# Patient Record
Sex: FEMALE | Race: WHITE | NOT HISPANIC OR LATINO | Employment: FULL TIME | URBAN - METROPOLITAN AREA
[De-identification: names, ages, dates, MRNs, and addresses within clinical notes are randomized per-mention and may not be internally consistent; named-entity substitution may affect disease eponyms.]

---

## 2017-04-11 ENCOUNTER — ALLSCRIPTS OFFICE VISIT (OUTPATIENT)
Dept: OTHER | Facility: OTHER | Age: 57
End: 2017-04-11

## 2017-04-11 DIAGNOSIS — M79.672 PAIN OF LEFT FOOT: ICD-10-CM

## 2017-04-12 ENCOUNTER — TRANSCRIBE ORDERS (OUTPATIENT)
Dept: ADMINISTRATIVE | Facility: HOSPITAL | Age: 57
End: 2017-04-12

## 2017-04-12 ENCOUNTER — HOSPITAL ENCOUNTER (OUTPATIENT)
Dept: RADIOLOGY | Facility: HOSPITAL | Age: 57
Discharge: HOME | End: 2017-04-12
Payer: COMMERCIAL

## 2017-04-12 DIAGNOSIS — M79.672 PAIN OF LEFT FOOT: ICD-10-CM

## 2017-04-12 PROCEDURE — 73630 X-RAY EXAM OF FOOT: CPT

## 2017-04-13 ENCOUNTER — GENERIC CONVERSION - ENCOUNTER (OUTPATIENT)
Dept: OTHER | Facility: OTHER | Age: 57
End: 2017-04-13

## 2017-09-12 ENCOUNTER — ALLSCRIPTS OFFICE VISIT (OUTPATIENT)
Dept: OTHER | Facility: OTHER | Age: 57
End: 2017-09-12

## 2017-11-08 DIAGNOSIS — E04.1 NONTOXIC SINGLE THYROID NODULE: ICD-10-CM

## 2017-11-08 DIAGNOSIS — I10 ESSENTIAL (PRIMARY) HYPERTENSION: ICD-10-CM

## 2018-01-09 NOTE — RESULT NOTES
Verified Results  * XR FOOT 3+ VIEW LEFT 12Apr2017 09:52AM Madelin LOPEZ Order Number: [id number]     Test Name Result Flag Reference   XR FOOT 3+ VW LEFT (Report)     LEFT FOOT     INDICATION: X53 403: Pain in left foot  History taken directly from the electronic ordering system  Heel pain  COMPARISON: None     VIEWS: 3     IMAGES: 3     FINDINGS:     There is no acute fracture or dislocation  Dorsal mid foot spurring  Calcaneal spurring  Soft tissues are unremarkable  IMPRESSION:     No acute osseous abnormality         Workstation performed: [id number]     Signed by:   Farrah Mane MD   4/13/17

## 2018-01-12 NOTE — RESULT NOTES
Verified Results  (1) CBC/PLT/DIFF [id number] 03:00PM Elsusan Paterson     Test Name Result Flag Reference   WBC COUNT 6 95 Thousand/uL  4 31-10 16   RBC COUNT 4 22 Million/uL  3 81-5 12   HEMOGLOBIN 12 7 g/dL  11 5-15 4   HEMATOCRIT 39 0 %  34 8-46  1   MCV 92 fL  82-98   MCH 30 1 pg  26 8-34 3   MCHC 32 6 g/dL  31 4-37 4   RDW 13 5 %  11 6-15 1   MPV 10 6 fL  8 9-12 7   PLATELET COUNT 258 Thousands/uL  149-390   NEUTROPHILS RELATIVE PERCENT 62 %  43-75   LYMPHOCYTES RELATIVE PERCENT 31 %  14-44   MONOCYTES RELATIVE PERCENT 6 %  4-12   EOSINOPHILS RELATIVE PERCENT 1 %  0-6   BASOPHILS RELATIVE PERCENT 0 %  0-1   NEUTROPHILS ABSOLUTE COUNT 4 30 Thousands/?L  1 85-7 62   LYMPHOCYTES ABSOLUTE COUNT 2 13 Thousands/?L  0 60-4 47   MONOCYTES ABSOLUTE COUNT 0 44 Thousand/?L  0 17-1 22   EOSINOPHILS ABSOLUTE COUNT 0 06 Thousand/?L  0 00-0 61   BASOPHILS ABSOLUTE COUNT 0 02 Thousands/?L  0 00-0 10     (1) COMPREHENSIVE METABOLIC PANEL [id number] 54:99DG Christen Paterson     Test Name Result Flag Reference   GLUCOSE,RANDM 93 mg/dL     If the patient is fasting, the ADA then defines impaired fasting glucose as > 100 mg/dL and diabetes as > or equal to 123 mg/dL     SODIUM 144 mmol/L  136-145   POTASSIUM 4 3 mmol/L  3 5-5 3   CHLORIDE 104 mmol/L  100-108   CARBON DIOXIDE 30 mmol/L  21-32   ANION GAP (CALC) 10 mmol/L  4-13   BLOOD UREA NITROGEN 16 mg/dL  5-25   CREATININE 0 70 mg/dL  0 60-1 30   Standardized to IDMS reference method   CALCIUM 8 8 mg/dL  8 3-10 1   BILI, TOTAL 0 70 mg/dL  0 20-1 00   ALK PHOSPHATAS 74 U/L     ALT (SGPT) 17 U/L  12-78   AST(SGOT) 13 U/L  5-45   ALBUMIN 3 6 g/dL  3 5-5 0   TOTAL PROTEIN 6 7 g/dL  6 4-8 2   eGFR Non-African American      >60 0 ml/min/1 73sq [facility] Disease Education Program recommendations are as follows:  GFR calculation is accurate only with a steady state creatinine  Chronic Kidney disease less than 60 ml/min/1 73 sq  meters  Kidney failure less than 15 ml/min/1 73 sq  meters  (1) LIPID PANEL, FASTING [id number] 03:00PM Laura Tijerina     Test Name Result Flag Reference   CHOLESTEROL 220 mg/dL H    HDL,DIRECT 82 mg/dL H 40-60   Specimen collection should occur prior to Metamizole administration due to the potential for falsely depressed results  LDL CHOLESTEROL CALCULATED 126 mg/dL H 0-100   Triglyceride:         Normal              <150 mg/dl       Borderline High    150-199 mg/dl       High               200-499 mg/dl       Very High          >499 mg/dl  Cholesterol:         Desirable        <200 mg/dl      Borderline High  200-239 mg/dl      High             >239 mg/dl  HDL Cholesterol:        High    >59 mg/dL      Low     <41 mg/dL  LDL CALCULATED:    This screening LDL is a calculated result  It does not have the accuracy of the Direct Measured LDL in the monitoring of patients with hyperlipidemia and/or statin therapy  Direct Measure LDL (ZTI957) must be ordered separately in these patients  TRIGLYCERIDES 62 mg/dL  <=150   Specimen collection should occur prior to N-Acetylcysteine or Metamizole administration due to the potential for falsely depressed results  (1) TSH [id number] 03:00PM Laura Tijerina     Test Name Result Flag Reference   TSH 0 967 uIU/mL  0 358-3 740   Patients undergoing fluorescein dye angiography may retain small amounts of fluorescein in the body for 48-72 hours post procedure  Samples containing fluorescein can produce falsely depressed TSH values  If the patient had this procedure,a specimen should be resubmitted post fluorescein clearance            The recommended reference ranges for TSH during pregnancy are as follows:  First trimester 0 1 to 2 5 uIU/mL  Second trimester  0 2 to 3 0 uIU/mL  Third trimester 0 3 to 3 0 uIU/m

## 2018-01-14 VITALS
TEMPERATURE: 98.3 F | SYSTOLIC BLOOD PRESSURE: 110 MMHG | OXYGEN SATURATION: 97 % | BODY MASS INDEX: 32.14 KG/M2 | HEIGHT: 66 IN | DIASTOLIC BLOOD PRESSURE: 76 MMHG | WEIGHT: 200 LBS | HEART RATE: 76 BPM

## 2018-01-14 VITALS
BODY MASS INDEX: 31.5 KG/M2 | HEIGHT: 66 IN | SYSTOLIC BLOOD PRESSURE: 126 MMHG | TEMPERATURE: 96.7 F | WEIGHT: 196 LBS | DIASTOLIC BLOOD PRESSURE: 82 MMHG | OXYGEN SATURATION: 98 % | HEART RATE: 71 BPM

## 2018-01-19 ENCOUNTER — ALLSCRIPTS OFFICE VISIT (OUTPATIENT)
Dept: OTHER | Facility: OTHER | Age: 58
End: 2018-01-19

## 2018-01-19 DIAGNOSIS — R92.2 INCONCLUSIVE MAMMOGRAM: ICD-10-CM

## 2018-01-19 DIAGNOSIS — N64.4 MASTODYNIA: ICD-10-CM

## 2018-01-19 DIAGNOSIS — Z12.11 ENCOUNTER FOR SCREENING FOR MALIGNANT NEOPLASM OF COLON: ICD-10-CM

## 2018-01-20 NOTE — PROGRESS NOTES
Assessment   1  Sprain of costal cartilage, initial encounter (848 3) (S23 41XA)   2  Breast pain (611 71) (N64 4)   3  Dense breasts (793 82) (R92 2)   4  Benign essential hypertension (401 1) (I10)    Plan   Benign essential hypertension    · (Q) COMPREHENSIVE METABOLIC PNL W/ADJUSTED CALCIUM; Status:Active; Requested XYJ:[id number];    · (Q) LIPID PANEL WITH DIRECT LDL; Status:Active; Requested ARS:[id number];   Breast pain, Dense breasts    · MAMMO DIAGNOSTIC BILATERAL W 3D & CAD; Status:Active; Requested SVT:[id number];      PMH: Special screening for malignant neoplasm of colon    · (1) OCCULT BLOOD, FECAL IMMUNOCHEMICAL TEST; Status:Active; Requested    EBE:[id number]; At this point I think that her symptoms are related to muscle sprain  This very likely happened because of her lifting heavy pus woods  I would like to refer her to physical therapy but her insurance will not cover that so I printed some home exercises and stretches that she can do-the more she does the exercises the more likely it will be that she can get better without physical therapy  She is not currently taking any anti-inflammatories and we also discussed that she could take an anti-inflammatory like Advil, Motrin, or Aleve daily for the next week or so to see if this helps at all  Additionally, given the fact that she does have discomfort when she pushes on the breasts and she has not had a mammogram in over a year I do think that she needs to have a mammogram   She also has dense breasts so I have ordered a 3D mammogram   Given her pain I have ordered a diagnostic mammogram           Hypertension-well controlled, continue current meds, I did give her a lab slip for a CMP and lipid panel-I stamped it with our [facility] discount stand up as she does not have any coverage for labs with insurance so this should give her a discount         Discussion/Summary   Possible side effects of new medications were reviewed with the patient/guardian today  The treatment plan was reviewed with the patient/guardian  The patient/guardian understands and agrees with the treatment plan      Chief Complaint   Pt c/o soreness in her chest area  She has been doing a lot of upper body motions lately but this has gotten worse  History of Present Illness   HPI: pt is here c/o pain under both breasts, left worse than the right  use the left side more it a little in the am by the evening 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 - lifting the hoods about once a week but there are 30-40 of them they don't go up that easy and she has to use a lot of effort acute injury that she can remember, though had a really bad cough in September but that resolved but this started in December so does not seem related to the bronchitis she had says the pain is worst when she pushes on the area under her breasts or if she pushes on her nipples has not had a mammo since 8/2016 denies any breast changes - no masses, skin changes, nipple discharge   needs a refill of her BP med has not had labs since 2016 and knows she is due insurance is not really insurance, it helps cover certain things but does not pay for labs or imaging so she really has been putting the blood work off thought she would be getting a new insurance at the beginning of the year but this does not look like it is going to happen now, might happen over the summer, but she would like to get her labs done now          Review of Systems        Constitutional: not feeling poorly,-- no recent weight gain,-- not feeling tired-- and-- no recent weight loss  Cardiovascular: no chest pain  Respiratory: no shortness of breath-- and-- no cough  Breasts: as noted in HPI  Musculoskeletal: as noted in HPI  Integumentary: as noted in HPI  Active Problems   1  Benign essential hypertension (401 1) (I10)   2   BMI 32 0-32 9,adult (V85 32) (Z68 32)   3  Dense breasts (793 82) (R92 2)   4  Esophageal reflux (530 81) (K21 9)   5  History of Malignant Melanoma Of The Skin   6  Single thyroid nodule (241 0) (E04 1)    Past Medical History   1  Acute bronchitis (466 0) (J20 9)   2  History of Fracture Of Distal End Of Radius (813 42)   3  History of Herniated nucleus pulposus, L5-S1 (722 10) (M51 27)   4  History of migraine (V12 49) (Z86 69)   5  History of Malignant Melanoma Of The Skin   6  History of Plantar fasciitis (728 71) (M72 2)  Active Problems And Past Medical History Reviewed: The active problems and past medical history were reviewed and updated today  Family History   Mother    1  Family history of Hypertension (V17 49)  Father    2  Family history of Acute Myocardial Infarction (V17 3)  Sister    3  Family history of Hypertension (V17 49)  Brother    4  Family history of Hypertension (V17 49)  Family History    5  Family history of Cancer   6  Family history of Precursor B-cell Lymphoblastic Lymphoma Of The Bone  Family History Reviewed: The family history was reviewed and updated today  Social History    · Being A Social Drinker   · Never a smoker  The social history was reviewed and updated today  Surgical History   1  History of  Section   2  History of Hysteroscopy   3  History of Tonsillectomy  Surgical History Reviewed: The surgical history was reviewed and updated today  Current Meds    1  HydroCHLOROthiazide 25 MG Oral Tablet; take 1/2-1 tab daily as needed; Therapy: 81Pze5426 to (Last Rx:[id number])  Requested for: 48Bfj9803 Ordered   2  Losartan Potassium-HCTZ 50-12 5 MG Oral Tablet; take one tablet by mouth one time     daily; Therapy: 04QAY2766 to (Evaluate:2018)  Requested for: 03Dms3608; Last     Rx:[id number] Ordered   3  Vitamin D 1000 UNIT Oral Tablet; Therapy: (Recorded:2014) to Recorded    Allergies   1   No Known Drug Allergies    Vitals Recorded: 04ZTP8308 09:28AM   Temperature 97 8 F   Heart Rate 71   Systolic 181   Diastolic 84   Height 5 ft 6 in   Weight 199 lb    BMI Calculated 32 12   BSA Calculated 2   O2 Saturation 98     Physical Exam        Constitutional      General appearance: No acute distress, well appearing and well nourished  Eyes      Conjunctiva and lids: No swelling, erythema or discharge  Ears, Nose, Mouth, and Throat      External inspection of ears and nose: Normal        Pulmonary      Respiratory effort: No increased work of breathing or signs of respiratory distress  Auscultation of lungs: Clear to auscultation  Cardiovascular      Auscultation of heart: Normal rate and rhythm, normal S1 and S2, without murmurs  Musculoskeletal      Gait and station: Normal        Inspection/palpation of joints, bones, and muscles: Abnormal  -- (she has ttp over the top ribs just under the breasts bilat, left worse than right)      Skin      Skin and subcutaneous tissue: Normal without rashes or lesions  Neurologic      Sensation: No sensory loss         Psychiatric      Orientation to person, place, and time: Normal        Mood and affect: Normal           Signatures    Electronically signed by : JACINTO Alarcon ; Jan 19 2018 10:53AM EST                       (Author)

## 2018-01-22 VITALS
DIASTOLIC BLOOD PRESSURE: 84 MMHG | BODY MASS INDEX: 31.98 KG/M2 | SYSTOLIC BLOOD PRESSURE: 124 MMHG | TEMPERATURE: 97.8 F | OXYGEN SATURATION: 98 % | HEART RATE: 71 BPM | HEIGHT: 66 IN | WEIGHT: 199 LBS

## 2018-01-23 LAB
ALBUMIN SERPL-MCNC: 4.3 G/DL (ref 3.6–5.1)
ALBUMIN/GLOB SERPL: 1.7 (CALC) (ref 1–2.5)
ALP SERPL-CCNC: 73 U/L (ref 33–130)
ALT SERPL-CCNC: 13 U/L (ref 6–29)
AST SERPL-CCNC: 14 U/L (ref 10–35)
BILIRUB SERPL-MCNC: 0.6 MG/DL (ref 0.2–1.2)
BUN SERPL-MCNC: 21 MG/DL (ref 7–25)
BUN/CREAT SERPL: NORMAL (CALC) (ref 6–22)
CALCIUM ALBUM COR SERPL-MCNC: 9.5 MG/DL (CALC) (ref 8.6–10.2)
CALCIUM SERPL-MCNC: 9.4 MG/DL (ref 8.6–10.4)
CHLORIDE SERPL-SCNC: 100 MMOL/L (ref 98–110)
CHOLEST SERPL-MCNC: 230 MG/DL
CHOLEST/HDLC SERPL: 2.4 (CALC)
CO2 SERPL-SCNC: 31 MMOL/L (ref 20–31)
CREAT SERPL-MCNC: 0.79 MG/DL (ref 0.5–1.05)
GLOBULIN SER CALC-MCNC: 2.6 G/DL (CALC) (ref 1.9–3.7)
GLUCOSE SERPL-MCNC: 94 MG/DL (ref 65–99)
HDLC SERPL-MCNC: 94 MG/DL
LDLC SERPL CALC-MCNC: 121 MG/DL (CALC)
NONHDLC SERPL-MCNC: 136 MG/DL (CALC)
POTASSIUM SERPL-SCNC: 4 MMOL/L (ref 3.5–5.3)
PROT SERPL-MCNC: 6.9 G/DL (ref 6.1–8.1)
SL AMB EGFR AFRICAN AMERICAN: 97 ML/MIN/1.73M2
SL AMB EGFR NON AFRICAN AMERICAN: 84 ML/MIN/1.73M2
SODIUM SERPL-SCNC: 139 MMOL/L (ref 135–146)
TRIGL SERPL-MCNC: 63 MG/DL

## 2018-01-24 ENCOUNTER — TELEPHONE (OUTPATIENT)
Dept: FAMILY MEDICINE CLINIC | Facility: CLINIC | Age: 58
End: 2018-01-24

## 2018-01-24 NOTE — TELEPHONE ENCOUNTER
----- Message from Emily Sparks MD sent at 1/24/2018 12:26 PM EST -----  Please let Shannon Sam know that the labs that were recently completed were normal

## 2018-03-14 ENCOUNTER — HOSPITAL ENCOUNTER (OUTPATIENT)
Dept: MAMMOGRAPHY | Facility: CLINIC | Age: 58
Discharge: HOME | End: 2018-03-14
Payer: COMMERCIAL

## 2018-03-14 DIAGNOSIS — N64.4 MASTODYNIA: ICD-10-CM

## 2018-03-14 DIAGNOSIS — R92.2 INCONCLUSIVE MAMMOGRAM: ICD-10-CM

## 2018-03-14 PROCEDURE — G0279 TOMOSYNTHESIS, MAMMO: HCPCS

## 2018-03-14 PROCEDURE — 77066 DX MAMMO INCL CAD BI: CPT

## 2018-03-17 ENCOUNTER — TELEPHONE (OUTPATIENT)
Dept: FAMILY MEDICINE CLINIC | Facility: CLINIC | Age: 58
End: 2018-03-17

## 2018-03-17 DIAGNOSIS — R92.2 DENSE BREASTS: Primary | ICD-10-CM

## 2018-03-17 DIAGNOSIS — N64.4 BREAST PAIN: ICD-10-CM

## 2018-03-17 PROBLEM — R92.30 DENSE BREASTS: Status: ACTIVE | Noted: 2017-04-11

## 2018-03-17 RX ORDER — LOSARTAN POTASSIUM AND HYDROCHLOROTHIAZIDE 12.5; 5 MG/1; MG/1
1 TABLET ORAL DAILY
Refills: 5 | COMMUNITY
Start: 2018-02-19 | End: 2018-08-24 | Stop reason: SDUPTHER

## 2018-03-17 RX ORDER — HYDROCHLOROTHIAZIDE 25 MG/1
TABLET ORAL
COMMUNITY
Start: 2017-04-11 | End: 2019-08-21 | Stop reason: SDUPTHER

## 2018-03-17 NOTE — TELEPHONE ENCOUNTER
----- Message from Adriana Ricks MD sent at 3/16/2018  4:20 PM EDT -----  Regarding: RE: question about mammo reading   The patient was told that the study would be dictated as incomplete without the ultrasound  The patient told us that she was concerned about her insurance coverage  Wanted to speak to her physician first  The technologist gave the patient information on [facility]  While I didn't see any suspicious mammogram findings, she has enough breast density that ultrasound would typically be indicated over the areas of pain       ----- Message -----  From: Yoni Herrera MD  Sent: 3/14/2018   6:04 PM  To: Adriana Ricks MD  Subject: question about mammo reading                     Hello,  I ordered a 3D mammo on this patient and the report came back reading that it was inconclusive and that targeted ultrasound was recommended  That being said, it looks as if the mammogram was normal per the report and states the patient turned down the ultrasounds  We called her to see what was going on and she says she was never told she needed ultrasounds  In any case - she says she will get them if they are needed so I was wondering if you could simply review the case and see if you feel she needs f/u u/s  I ordered it as a diagnostic mammo because of her pain in both breasts but truly she was also overdue for screening      Thanks so much,  Rashawn Hart

## 2018-03-17 NOTE — TELEPHONE ENCOUNTER
This patient had a mammogram for which ultrasound was recommended based on her diagnosis of breast pain and the density of her breasts  I have now gotten feedback from both the radiologist who read the study and a breast surgeon and both say that she should get the ultrasound to be completely sure everything is Janjohn Mireles  Both suspect, as do I, that everything is OK but the best way to confirm that would be with ultrasound  The radiologists exact words were:  "While I didn't see any suspicious mammogram findings, she has enough breast density that ultrasound would typically be indicated over the areas of pain  "    It is up to her at this point, with this information, if she wants to pursue ultrasound  If so she should be able to call the same place where she got the mammogram and schedule it  She can absolutely check with her insurance to see if it would be covered as it may  It is an appropriate indication for the ultrasound

## 2018-03-30 ENCOUNTER — APPOINTMENT (OUTPATIENT)
Dept: ULTRASOUND IMAGING | Facility: CLINIC | Age: 58
End: 2018-03-30
Payer: COMMERCIAL

## 2018-03-30 ENCOUNTER — HOSPITAL ENCOUNTER (OUTPATIENT)
Dept: ULTRASOUND IMAGING | Facility: CLINIC | Age: 58
Discharge: HOME | End: 2018-03-30
Payer: COMMERCIAL

## 2018-03-30 DIAGNOSIS — R92.2 DENSE BREASTS: ICD-10-CM

## 2018-03-30 DIAGNOSIS — N64.4 BREAST PAIN: ICD-10-CM

## 2018-03-30 PROCEDURE — 76642 ULTRASOUND BREAST LIMITED: CPT

## 2018-04-17 ENCOUNTER — OFFICE VISIT (OUTPATIENT)
Dept: FAMILY MEDICINE CLINIC | Facility: CLINIC | Age: 58
End: 2018-04-17
Payer: COMMERCIAL

## 2018-04-17 VITALS
HEART RATE: 81 BPM | DIASTOLIC BLOOD PRESSURE: 80 MMHG | BODY MASS INDEX: 32.75 KG/M2 | SYSTOLIC BLOOD PRESSURE: 108 MMHG | HEIGHT: 66 IN | WEIGHT: 203.75 LBS | OXYGEN SATURATION: 98 % | TEMPERATURE: 96.5 F

## 2018-04-17 DIAGNOSIS — R35.0 URINARY FREQUENCY: Primary | ICD-10-CM

## 2018-04-17 DIAGNOSIS — I10 BENIGN ESSENTIAL HYPERTENSION: ICD-10-CM

## 2018-04-17 DIAGNOSIS — N30.01 ACUTE CYSTITIS WITH HEMATURIA: ICD-10-CM

## 2018-04-17 LAB
SL AMB  POCT GLUCOSE, UA: NEGATIVE
SL AMB LEUKOCYTE ESTERASE,UA: ABNORMAL
SL AMB POCT BILIRUBIN,UA: NEGATIVE
SL AMB POCT BLOOD,UA: ABNORMAL
SL AMB POCT CLARITY,UA: ABNORMAL
SL AMB POCT COLOR,UA: ABNORMAL
SL AMB POCT KETONES,UA: NEGATIVE
SL AMB POCT NITRITE,UA: NEGATIVE
SL AMB POCT PH,UA: 5
SL AMB POCT SPECIFIC GRAVITY,UA: 1.03
SL AMB POCT URINE PROTEIN: POSITIVE
SL AMB POCT UROBILINOGEN: 0.2

## 2018-04-17 PROCEDURE — 81002 URINALYSIS NONAUTO W/O SCOPE: CPT | Performed by: FAMILY MEDICINE

## 2018-04-17 PROCEDURE — 99213 OFFICE O/P EST LOW 20 MIN: CPT | Performed by: FAMILY MEDICINE

## 2018-04-17 RX ORDER — CIPROFLOXACIN 500 MG/1
500 TABLET, FILM COATED ORAL EVERY 12 HOURS SCHEDULED
Qty: 14 TABLET | Refills: 0 | Status: SHIPPED | OUTPATIENT
Start: 2018-04-17 | End: 2018-04-24

## 2018-04-17 NOTE — PROGRESS NOTES
Assessment/Plan:      Diagnoses and all orders for this visit:    Urinary frequency  -     POCT urine dip    Acute cystitis with hematuria  -     ciprofloxacin (CIPRO) 500 mg tablet; Take 1 tablet (500 mg total) by mouth every 12 (twelve) hours for 7 days    Benign essential hypertension  -     CBC and differential; Future  -     TSH, 3rd generation; Future        Cstitis: start cipro 1 tab twice a day, increase fluids  Call if back pains, blood in urine, symptoms not resolving, fever  Hypertension: controllled continue current medication  Subjective:     Patient ID: Nitesh Segal is a 63 y o  female  STARTED WITH SYMPTOMS ON Sunday  TODAY STARTED WITH URINARY FREQUENCY  BURNING WITH URINATION  NOTICED BLOOD TODAY  No history of kidney stones, no back pain  No fever  Review of Systems   Constitutional: Negative for fatigue and fever  HENT: Negative  Respiratory: Negative  Negative for cough  Cardiovascular: Negative  Gastrointestinal: Negative  Genitourinary: Positive for dysuria, frequency and hematuria  Negative for flank pain  Musculoskeletal: Negative  Skin: Negative  Neurological: Negative  Psychiatric/Behavioral: Negative  The following portions of the patient's history were reviewed and updated as appropriate: allergies, current medications, past family history, past medical history, past social history, past surgical history and problem list     Objective:  Vitals:    04/17/18 1325   BP: 108/80   Pulse: 81   Temp: (!) 96 5 °F (35 8 °C)   SpO2: 98%   Weight: 92 4 kg (203 lb 12 oz)   Height: 5' 6" (1 676 m)      Physical Exam   Constitutional: She is oriented to person, place, and time  She appears well-developed and well-nourished  HENT:   Head: Normocephalic and atraumatic  Cardiovascular: Normal rate, regular rhythm and normal heart sounds  Pulmonary/Chest: Effort normal and breath sounds normal    Abdominal: Soft   Bowel sounds are normal    No suprapubc tenderness   Neurological: She is alert and oriented to person, place, and time  Skin: Skin is warm and dry  Psychiatric: She has a normal mood and affect  Her behavior is normal  Judgment and thought content normal    Nursing note and vitals reviewed

## 2018-08-24 DIAGNOSIS — I10 ESSENTIAL HYPERTENSION: Primary | ICD-10-CM

## 2018-08-24 RX ORDER — LOSARTAN POTASSIUM AND HYDROCHLOROTHIAZIDE 12.5; 5 MG/1; MG/1
TABLET ORAL
Qty: 30 TABLET | Refills: 0 | Status: SHIPPED | OUTPATIENT
Start: 2018-08-24 | End: 2018-09-05 | Stop reason: SDUPTHER

## 2018-08-24 NOTE — TELEPHONE ENCOUNTER
Please let the patient know that I refilled one month for medications  She is due for a routine health maintenance exam/physical   Please ask her what lab she goes to and I will enter labs

## 2018-09-05 ENCOUNTER — OFFICE VISIT (OUTPATIENT)
Dept: FAMILY MEDICINE CLINIC | Facility: CLINIC | Age: 58
End: 2018-09-05
Payer: COMMERCIAL

## 2018-09-05 VITALS
TEMPERATURE: 98 F | HEIGHT: 67 IN | WEIGHT: 201 LBS | BODY MASS INDEX: 31.55 KG/M2 | HEART RATE: 81 BPM | SYSTOLIC BLOOD PRESSURE: 118 MMHG | DIASTOLIC BLOOD PRESSURE: 70 MMHG

## 2018-09-05 DIAGNOSIS — Z00.00 WELL ADULT EXAM: Primary | ICD-10-CM

## 2018-09-05 DIAGNOSIS — I10 ESSENTIAL HYPERTENSION: ICD-10-CM

## 2018-09-05 DIAGNOSIS — I10 BENIGN ESSENTIAL HYPERTENSION: ICD-10-CM

## 2018-09-05 PROBLEM — N30.01 ACUTE CYSTITIS WITH HEMATURIA: Status: RESOLVED | Noted: 2018-04-17 | Resolved: 2018-09-05

## 2018-09-05 PROCEDURE — 99396 PREV VISIT EST AGE 40-64: CPT | Performed by: FAMILY MEDICINE

## 2018-09-05 RX ORDER — LOSARTAN POTASSIUM AND HYDROCHLOROTHIAZIDE 12.5; 5 MG/1; MG/1
1 TABLET ORAL DAILY
Qty: 90 TABLET | Refills: 0
Start: 2018-09-05 | End: 2018-10-23 | Stop reason: SDUPTHER

## 2018-09-05 NOTE — PATIENT INSTRUCTIONS
Schedule [facility]  Increase activity 15 min twice a week   Increase protein   Keep appts with Dr Raiza Valdes for Adults   AMBULATORY CARE:   A wellness visit  is when you see your healthcare provider to get screened for health problems  You can also get advice on how to stay healthy  Write down your questions so you remember to ask them  Ask your healthcare provider how often you should have a wellness visit  What happens at a wellness visit:  Your healthcare provider will ask about your health, and your family history of health problems  This includes high blood pressure, heart disease, and cancer  He or she will ask if you have symptoms that concern you, if you smoke, and about your mood  You may also be asked about your intake of medicines, supplements, food, and alcohol  Any of the following may be done:  · Your weight  will be checked  Your height may also be checked so your body mass index (BMI) can be calculated  Your BMI shows if you are at a healthy weight  · Your blood pressure  and heart rate will be checked  Your temperature may also be checked  · Blood and urine tests  may be done  Blood tests may be done to check your cholesterol levels  Abnormal cholesterol levels increase your risk for heart disease and stroke  You may also need a blood or urine test to check for diabetes if you are at increased risk  Urine tests may be done to look for signs of an infection or kidney disease  · A physical exam  includes checking your heartbeat and lungs with a stethoscope  Your healthcare provider may also check your skin to look for sun damage  · Screening tests  may be recommended  A screening test is done to check for diseases that may not cause symptoms  The screening tests you may need depend on your age, gender, family history, and lifestyle habits  For example, colorectal screening may be recommended if you are 48years old or older    Screening tests you need if you are a woman:   · A Pap smear  is used to screen for cervical cancer  Pap smears are usually done every 3 to 5 years depending on your age  You may need them more often if you have had abnormal Pap smear test results in the past  Ask your healthcare provider how often you should have a Pap smear  · A mammogram  is an x-ray of your breasts to screen for breast cancer  Experts recommend mammograms every 2 years starting at age 48 years  You may need a mammogram at age 52 years or younger if you have an increased risk for breast cancer  Talk to your healthcare provider about when you should start having mammograms and how often you need them  Vaccines you may need:   · Get an influenza vaccine  every year  The influenza vaccine protects you from the flu  Several types of viruses cause the flu  The viruses change over time, so new vaccines are made each year  · Get a tetanus-diphtheria (Td) booster vaccine  every 10 years  This vaccine protects you against tetanus and diphtheria  Tetanus is a severe infection that may cause painful muscle spasms and lockjaw  Diphtheria is a severe bacterial infection that causes a thick covering in the back of your mouth and throat  · Get a human papillomavirus (HPV) vaccine  if you are female and aged 23 to 32 or male 23 to 24 and never received it  This vaccine protects you from HPV infection  HPV is the most common infection spread by sexual contact  HPV may also cause vaginal, penile, and anal cancers  · Get a pneumococcal vaccine  if you are aged 72 years or older  The pneumococcal vaccine is an injection given to protect you from pneumococcal disease  Pneumococcal disease is an infection caused by pneumococcal bacteria  The infection may cause pneumonia, meningitis, or an ear infection  · Get a shingles vaccine  if you are aged 61 or older, even if you have had shingles before   The shingles vaccine is an injection to protect you from the varicella-zoster virus  This is the same virus that causes chickenpox  Shingles is a painful rash that develops in people who had chickenpox or have been exposed to the virus  How to eat healthy:  My Plate is a model for planning healthy meals  It shows the types and amounts of foods that should go on your plate  Fruits and vegetables make up about half of your plate, and grains and protein make up the other half  A serving of dairy is included on the side of your plate  The amount of calories and serving sizes you need depends on your age, gender, weight, and height  Examples of healthy foods are listed below:  · Eat a variety of vegetables  such as dark green, red, and orange vegetables  You can also include canned vegetables low in sodium (salt) and frozen vegetables without added butter or sauces  · Eat a variety of fresh fruits , canned fruit in 100% juice, frozen fruit, and dried fruit  · Include whole grains  At least half of the grains you eat should be whole grains  Examples include whole-wheat bread, wheat pasta, brown rice, and whole-grain cereals such as oatmeal     · Eat a variety of protein foods such as seafood (fish and shellfish), lean meat, and poultry without skin (turkey and chicken)  Examples of lean meats include pork leg, shoulder, or tenderloin, and beef round, sirloin, tenderloin, and extra lean ground beef  Other protein foods include eggs and egg substitutes, beans, peas, soy products, nuts, and seeds  · Choose low-fat dairy products such as skim or 1% milk or low-fat yogurt, cheese, and cottage cheese  · Limit unhealthy fats  such as butter, hard margarine, and shortening  Exercise:  Exercise at least 30 minutes per day on most days of the week  Some examples of exercise include walking, biking, dancing, and swimming  You can also fit in more physical activity by taking the stairs instead of the elevator or parking farther away from stores   Include muscle strengthening activities 2 days each week  Regular exercise provides many health benefits  It helps you manage your weight, and decreases your risk for type 2 diabetes, heart disease, stroke, and high blood pressure  Exercise can also help improve your mood  Ask your healthcare provider about the best exercise plan for you  General health and safety guidelines:   · Do not smoke  Nicotine and other chemicals in cigarettes and cigars can cause lung damage  Ask your healthcare provider for information if you currently smoke and need help to quit  E-cigarettes or smokeless tobacco still contain nicotine  Talk to your healthcare provider before you use these products  · Limit alcohol  A drink of alcohol is 12 ounces of beer, 5 ounces of wine, or 1½ ounces of liquor  · Lose weight, if needed  Being overweight increases your risk of certain health conditions  These include heart disease, high blood pressure, type 2 diabetes, and certain types of cancer  · Protect your skin  Do not sunbathe or use tanning beds  Use sunscreen with a SPF 15 or higher  Apply sunscreen at least 15 minutes before you go outside  Reapply sunscreen every 2 hours  Wear protective clothing, hats, and sunglasses when you are outside  · Drive safely  Always wear your seatbelt  Make sure everyone in your car wears a seatbelt  A seatbelt can save your life if you are in an accident  Do not use your cell phone when you are driving  This could distract you and cause an accident  Pull over if you need to make a call or send a text message  · Practice safe sex  Use latex condoms if are sexually active and have more than one partner  Your healthcare provider may recommend screening tests for sexually transmitted infections (STIs)  · Wear helmets, lifejackets, and protective gear  Always wear a helmet when you ride a bike or motorcycle, go skiing, or play sports that could cause a head injury  Wear protective equipment when you play sports   Wear a lifejacket when you are on a boat or doing water sports  © 2017 [address] [employer] Information is for End User's use only and may not be sold, redistributed or otherwise used for commercial purposes  All illustrations and images included in CareNotes® are the copyrighted property of A D A M , [employer]  or Ronaldo Kohli  The above information is an  only  It is not intended as medical advice for individual conditions or treatments  Talk to your doctor, nurse or pharmacist before following any medical regimen to see if it is safe and effective for you

## 2018-09-06 NOTE — PROGRESS NOTES
Richy Kurtz is a 63 y o   female and is here for routine health maintenance  The patient reports no problems  History of Present Illness     Patient is here for physical today  She denies complaints  She denies any chest pain or shortness of breath  Patient had a recent melanoma on her left leg that was taken off by Dr See Garcia  Well Adult Physical   Patient here for a comprehensive physical exam       Diet and Physical Activity  Diet: well balanced diet  Weight concerns: Patient has class 1 obesity (BMI 30-34  9)  Exercise: infrequently      Depression Screen  PHQ-9 Depression Screening    PHQ-9:    Frequency of the following problems over the past two weeks:       Little interest or pleasure in doing things:  0 - not at all  Feeling down, depressed, or hopeless:  0 - not at all  PHQ-2 Score:  0          General Health  Hearing: Normal:  bilateral  Vision: no vision problems  Dental: regular dental visits     History:  LMP: No LMP recorded  Patient is postmenopausal   Colononoscopy recent colonoscopy 5 year follow up  Mammogram 3/2018   Pap will schedule at [facility]  Abnormal pap? no  Smoker no Annual screening with low-dose helical computed tomography (CT) for patients age 54 to 76 years with history of smoking at least 30 pack-years and, if a former smoker, had quit within the previous 15 years      The following portions of the patient's history were reviewed and updated as appropriate: allergies, current medications, past family history, past medical history, past social history, past surgical history and problem list     Review of Systems     Review of Systems   Constitutional: Negative  Negative for fatigue and fever  HENT: Negative  Eyes: Negative  Respiratory: Negative  Negative for cough  Cardiovascular: Negative  Gastrointestinal: Negative  Endocrine: Negative  Genitourinary: Negative  Musculoskeletal: Negative  Skin: Negative  Allergic/Immunologic: Negative  Neurological: Negative  Psychiatric/Behavioral: Negative  Past Medical History     Past Medical History:   Diagnosis Date    Fracture of distal end of radius     Herniated nucleus pulposus, L5-S1     Malignant melanoma of skin ([address] )     Resolved: 2017    Migraine     Plantar fasciitis     Last Assessed: 2016       Past Surgical History     Past Surgical History:   Procedure Laterality Date     SECTION      HYSTEROSCOPY      TONSILLECTOMY         Social History     Social History     Social History    Marital status: /Civil Union     Spouse name: N/A    Number of children: N/A    Years of education: N/A     Social History Main Topics    Smoking status: Never Smoker    Smokeless tobacco: Never Used    Alcohol use No      Comment: social drinker per [employer]    Drug use: No    Sexual activity: Not Asked     Other Topics Concern    None     Social History Narrative    None       Family History     Family History   Problem Relation Age of Onset    Hypertension Mother     Heart attack Father     Hypertension Sister     Hypertension Brother     Cancer Family     Lymphoma Family         Precursor B-cell Lymphoblastic of the bone       Current Medications       Current Outpatient Prescriptions:     losartan-hydrochlorothiazide (HYZAAR) 50-12 5 mg per tablet, Take 1 tablet by mouth daily, Disp: 90 tablet, Rfl: 0    hydrochlorothiazide (HYDRODIURIL) 25 mg tablet, Take by mouth, Disp: , Rfl:      Allergies     No Known Allergies    Objective     /70   Pulse 81   Temp 98 °F (36 7 °C)   Ht 5' 7" (1 702 m)   Wt 91 2 kg (201 lb)   BMI 31 48 kg/m²      Physical Exam   Constitutional: She is oriented to person, place, and time  She appears well-developed and well-nourished  HENT:   Head: Normocephalic and atraumatic  Cardiovascular: Normal rate, regular rhythm and normal heart sounds      Pulmonary/Chest: Effort normal and breath sounds normal    Abdominal: Soft  Bowel sounds are normal    Neurological: She is alert and oriented to person, place, and time  Skin: Skin is warm and dry  Psychiatric: She has a normal mood and affect  Her behavior is normal  Judgment and thought content normal    Nursing note and vitals reviewed  No exam data present    Health Maintenance     Health Maintenance   Topic Date Due    DTaP,Tdap,and Td Vaccines (1 - Tdap) 12/05/2018 (Originally 2/11/1981)    PAP SMEAR  12/05/2018 (Originally [birth year]/11/1982)    CRC Screening: Colonoscopy  12/05/2018 (Originally 1960)    INFLUENZA VACCINE  09/05/2019 (Originally 9/1/2018)    Pneumococcal PPSV23 Highest Risk Adult (1 of 3 - PCV13) 09/05/2019 (Originally 2/11/1980)    MAMMOGRAM  03/14/2019    Depression Screening PHQ  09/05/2019     There is no immunization history for the selected administration types on file for this patient  Assessment/Plan         1  Healthy female exam   2  Patient Counseling:   · Nutrition: Stressed importance of a well balanced diet, moderation of sodium/saturated fat, caloric balance and sufficient intake of fiber  · Exercise: Stressed the importance of regular exercise with a goal of 150 minutes per week  · Dental Health: Discussed daily flossing and brushing and regular dental visits     · Immunizations reviewed  Deferring influenza immunization  · Discussed benefits of screening   · Discussed the patient's BMI with her  The BMI is above average; BMI management plan is completed  3  Cancer Screening  4  Labs   5 2 Follow up next physical in 1 year      Janna Clarity, DO

## 2018-09-21 DIAGNOSIS — I10 ESSENTIAL HYPERTENSION: ICD-10-CM

## 2018-09-24 RX ORDER — LOSARTAN POTASSIUM AND HYDROCHLOROTHIAZIDE 12.5; 5 MG/1; MG/1
TABLET ORAL
Qty: 30 TABLET | Refills: 0 | Status: SHIPPED | OUTPATIENT
Start: 2018-09-24 | End: 2018-11-02 | Stop reason: SDUPTHER

## 2018-10-23 DIAGNOSIS — I10 ESSENTIAL HYPERTENSION: ICD-10-CM

## 2018-10-23 RX ORDER — LOSARTAN POTASSIUM AND HYDROCHLOROTHIAZIDE 12.5; 5 MG/1; MG/1
1 TABLET ORAL DAILY
Qty: 30 TABLET | Refills: 5
Start: 2018-10-23 | End: 2018-10-31 | Stop reason: SDUPTHER

## 2018-10-31 DIAGNOSIS — I10 ESSENTIAL HYPERTENSION: ICD-10-CM

## 2018-11-01 DIAGNOSIS — I10 ESSENTIAL HYPERTENSION: ICD-10-CM

## 2018-11-01 RX ORDER — LOSARTAN POTASSIUM AND HYDROCHLOROTHIAZIDE 12.5; 5 MG/1; MG/1
1 TABLET ORAL DAILY
Qty: 30 TABLET | Refills: 2
Start: 2018-11-01 | End: 2018-11-01 | Stop reason: SDUPTHER

## 2018-11-02 DIAGNOSIS — I10 ESSENTIAL HYPERTENSION: ICD-10-CM

## 2018-11-03 RX ORDER — LOSARTAN POTASSIUM AND HYDROCHLOROTHIAZIDE 12.5; 5 MG/1; MG/1
1 TABLET ORAL DAILY
Qty: 90 TABLET | Refills: 0 | Status: SHIPPED | OUTPATIENT
Start: 2018-11-03 | End: 2019-02-02 | Stop reason: SDUPTHER

## 2018-11-03 RX ORDER — LOSARTAN POTASSIUM AND HYDROCHLOROTHIAZIDE 12.5; 5 MG/1; MG/1
TABLET ORAL
Qty: 30 TABLET | Refills: 0 | Status: SHIPPED | OUTPATIENT
Start: 2018-11-03 | End: 2019-02-04 | Stop reason: SDUPTHER

## 2019-02-02 DIAGNOSIS — I10 ESSENTIAL HYPERTENSION: ICD-10-CM

## 2019-02-04 DIAGNOSIS — E04.1 SINGLE THYROID NODULE: Primary | ICD-10-CM

## 2019-02-04 DIAGNOSIS — I10 BENIGN ESSENTIAL HYPERTENSION: ICD-10-CM

## 2019-02-04 RX ORDER — LOSARTAN POTASSIUM AND HYDROCHLOROTHIAZIDE 12.5; 5 MG/1; MG/1
1 TABLET ORAL DAILY
Qty: 90 TABLET | Refills: 0 | Status: SHIPPED | OUTPATIENT
Start: 2019-02-04 | End: 2019-05-16 | Stop reason: SDUPTHER

## 2019-02-04 NOTE — TELEPHONE ENCOUNTER
PATIENT'S INSURANCE HAS CHANGED - PLEASE CHANGE LABS TO [facility]  SHE WILL BE GOING AT THE END OF THE WEEK

## 2019-02-23 LAB
ALBUMIN SERPL-MCNC: 4.7 G/DL (ref 3.5–5.5)
ALBUMIN/GLOB SERPL: 1.8 {RATIO} (ref 1.2–2.2)
ALP SERPL-CCNC: 87 IU/L (ref 39–117)
ALT SERPL-CCNC: 15 IU/L (ref 0–32)
AST SERPL-CCNC: 16 IU/L (ref 0–40)
BASOPHILS # BLD AUTO: 0 X10E3/UL (ref 0–0.2)
BASOPHILS NFR BLD AUTO: 0 %
BILIRUB SERPL-MCNC: 0.6 MG/DL (ref 0–1.2)
BUN SERPL-MCNC: 17 MG/DL (ref 6–24)
BUN/CREAT SERPL: 22 (ref 9–23)
CALCIUM SERPL-MCNC: 10.1 MG/DL (ref 8.7–10.2)
CHLORIDE SERPL-SCNC: 97 MMOL/L (ref 96–106)
CHOLEST SERPL-MCNC: 252 MG/DL (ref 100–199)
CO2 SERPL-SCNC: 28 MMOL/L (ref 20–29)
CREAT SERPL-MCNC: 0.77 MG/DL (ref 0.57–1)
EOSINOPHIL # BLD AUTO: 0.1 X10E3/UL (ref 0–0.4)
EOSINOPHIL NFR BLD AUTO: 1 %
ERYTHROCYTE [DISTWIDTH] IN BLOOD BY AUTOMATED COUNT: 13.9 % (ref 12.3–15.4)
GLOBULIN SER-MCNC: 2.6 G/DL (ref 1.5–4.5)
GLUCOSE SERPL-MCNC: 101 MG/DL (ref 65–99)
HCT VFR BLD AUTO: 41.7 % (ref 34–46.6)
HDLC SERPL-MCNC: 95 MG/DL
HGB BLD-MCNC: 13.5 G/DL (ref 11.1–15.9)
IMM GRANULOCYTES # BLD: 0 X10E3/UL (ref 0–0.1)
IMM GRANULOCYTES NFR BLD: 0 %
LDLC SERPL CALC-MCNC: 143 MG/DL (ref 0–99)
LDLC/HDLC SERPL: 1.5 RATIO (ref 0–3.2)
LYMPHOCYTES # BLD AUTO: 2.2 X10E3/UL (ref 0.7–3.1)
LYMPHOCYTES NFR BLD AUTO: 28 %
MCH RBC QN AUTO: 29.3 PG (ref 26.6–33)
MCHC RBC AUTO-ENTMCNC: 32.4 G/DL (ref 31.5–35.7)
MCV RBC AUTO: 91 FL (ref 79–97)
MONOCYTES # BLD AUTO: 0.4 X10E3/UL (ref 0.1–0.9)
MONOCYTES NFR BLD AUTO: 5 %
NEUTROPHILS # BLD AUTO: 5.2 X10E3/UL (ref 1.4–7)
NEUTROPHILS NFR BLD AUTO: 66 %
PLATELET # BLD AUTO: 281 X10E3/UL (ref 150–379)
POTASSIUM SERPL-SCNC: 4.2 MMOL/L (ref 3.5–5.2)
PROT SERPL-MCNC: 7.3 G/DL (ref 6–8.5)
RBC # BLD AUTO: 4.61 X10E6/UL (ref 3.77–5.28)
SL AMB EGFR AFRICAN AMERICAN: 98 ML/MIN/1.73
SL AMB EGFR NON AFRICAN AMERICAN: 85 ML/MIN/1.73
SL AMB VLDL CHOLESTEROL CALC: 14 MG/DL (ref 5–40)
SODIUM SERPL-SCNC: 139 MMOL/L (ref 134–144)
TRIGL SERPL-MCNC: 69 MG/DL (ref 0–149)
TSH SERPL DL<=0.005 MIU/L-ACNC: 1.22 UIU/ML (ref 0.45–4.5)
WBC # BLD AUTO: 7.9 X10E3/UL (ref 3.4–10.8)

## 2019-05-16 DIAGNOSIS — I10 ESSENTIAL HYPERTENSION: ICD-10-CM

## 2019-05-17 RX ORDER — LOSARTAN POTASSIUM AND HYDROCHLOROTHIAZIDE 12.5; 5 MG/1; MG/1
TABLET ORAL
Qty: 90 TABLET | Refills: 0 | Status: SHIPPED | OUTPATIENT
Start: 2019-05-17 | End: 2019-08-11 | Stop reason: SDUPTHER

## 2019-07-17 ENCOUNTER — TELEPHONE (OUTPATIENT)
Dept: ENDOCRINOLOGY | Facility: HOSPITAL | Age: 59
End: 2019-07-17

## 2019-07-17 NOTE — TELEPHONE ENCOUNTER
Patient scheduled for 9-23-19  Last seen by Dr Gregorio Sacks at [facility] over 3 years ago  Has not had any blood work done by PCP in over a year  Needs new orders for U/S & blood work  Can mail to patient  Ordered chart from storage

## 2019-07-19 DIAGNOSIS — E04.1 LEFT THYROID NODULE: Primary | ICD-10-CM

## 2019-07-19 DIAGNOSIS — E04.2 GOITER, NONTOXIC, MULTINODULAR: ICD-10-CM

## 2019-07-19 DIAGNOSIS — Z86.39 HISTORY OF SUBACUTE THYROIDITIS: ICD-10-CM

## 2019-08-11 DIAGNOSIS — I10 ESSENTIAL HYPERTENSION: ICD-10-CM

## 2019-08-11 RX ORDER — LOSARTAN POTASSIUM AND HYDROCHLOROTHIAZIDE 12.5; 5 MG/1; MG/1
TABLET ORAL
Qty: 90 TABLET | Refills: 0 | Status: SHIPPED | OUTPATIENT
Start: 2019-08-11 | End: 2019-08-21 | Stop reason: ALTCHOICE

## 2019-08-12 ENCOUNTER — HOSPITAL ENCOUNTER (OUTPATIENT)
Dept: ULTRASOUND IMAGING | Facility: HOSPITAL | Age: 59
Discharge: HOME | End: 2019-08-12
Payer: COMMERCIAL

## 2019-08-12 DIAGNOSIS — Z86.39 HISTORY OF SUBACUTE THYROIDITIS: ICD-10-CM

## 2019-08-12 DIAGNOSIS — E04.1 LEFT THYROID NODULE: ICD-10-CM

## 2019-08-12 DIAGNOSIS — E04.2 GOITER, NONTOXIC, MULTINODULAR: ICD-10-CM

## 2019-08-12 PROCEDURE — 76536 US EXAM OF HEAD AND NECK: CPT

## 2019-08-21 ENCOUNTER — OFFICE VISIT (OUTPATIENT)
Dept: FAMILY MEDICINE CLINIC | Facility: CLINIC | Age: 59
End: 2019-08-21
Payer: COMMERCIAL

## 2019-08-21 VITALS
WEIGHT: 202.5 LBS | TEMPERATURE: 97.6 F | HEIGHT: 67 IN | HEART RATE: 71 BPM | DIASTOLIC BLOOD PRESSURE: 85 MMHG | OXYGEN SATURATION: 98 % | BODY MASS INDEX: 31.78 KG/M2 | SYSTOLIC BLOOD PRESSURE: 125 MMHG

## 2019-08-21 DIAGNOSIS — I10 BENIGN ESSENTIAL HYPERTENSION: ICD-10-CM

## 2019-08-21 DIAGNOSIS — Z11.59 ENCOUNTER FOR HEPATITIS C SCREENING TEST FOR LOW RISK PATIENT: ICD-10-CM

## 2019-08-21 DIAGNOSIS — R05.9 COUGH: Primary | ICD-10-CM

## 2019-08-21 DIAGNOSIS — E04.2 GOITER, NONTOXIC, MULTINODULAR: ICD-10-CM

## 2019-08-21 DIAGNOSIS — Z23 NEED FOR TDAP VACCINATION: ICD-10-CM

## 2019-08-21 PROBLEM — Z00.00 WELL ADULT EXAM: Status: RESOLVED | Noted: 2018-09-05 | Resolved: 2019-08-21

## 2019-08-21 LAB
T3FREE SERPL-MCNC: 3.4 PG/ML (ref 2–4.4)
T4 FREE SERPL-MCNC: 1.13 NG/DL (ref 0.82–1.77)
THYROGLOB AB SERPL-ACNC: <1 IU/ML (ref 0–0.9)
THYROPEROXIDASE AB SERPL-ACNC: 14 IU/ML (ref 0–34)
TSH SERPL DL<=0.005 MIU/L-ACNC: 1.18 UIU/ML (ref 0.45–4.5)

## 2019-08-21 PROCEDURE — 3074F SYST BP LT 130 MM HG: CPT | Performed by: FAMILY MEDICINE

## 2019-08-21 PROCEDURE — 3008F BODY MASS INDEX DOCD: CPT | Performed by: FAMILY MEDICINE

## 2019-08-21 PROCEDURE — 99214 OFFICE O/P EST MOD 30 MIN: CPT | Performed by: FAMILY MEDICINE

## 2019-08-21 RX ORDER — HYDROCHLOROTHIAZIDE 25 MG/1
12.5 TABLET ORAL DAILY
Qty: 30 TABLET | Refills: 5 | Status: SHIPPED | OUTPATIENT
Start: 2019-08-21 | End: 2019-10-25 | Stop reason: ALTCHOICE

## 2019-08-21 RX ORDER — AMLODIPINE BESYLATE 5 MG/1
5 TABLET ORAL DAILY
Qty: 30 TABLET | Refills: 5 | Status: SHIPPED | OUTPATIENT
Start: 2019-08-21 | End: 2019-10-25 | Stop reason: ALTCHOICE

## 2019-08-21 NOTE — PROGRESS NOTES
Assessment/Plan:      Diagnoses and all orders for this visit:    Cough  Comments:  likely related to arb, had cough with ace    Benign essential hypertension  Comments:  controlled, change losartan to amlodipine and hctz daily and monitor blood pressure  Orders:  -     amLODIPine (NORVASC) 5 mg tablet; Take 1 tablet (5 mg total) by mouth daily  -     hydrochlorothiazide (HYDRODIURIL) 25 mg tablet; Take 0 5 tablets (12 5 mg total) by mouth daily    Goiter, nontoxic, multinodular  Comments:  thyroid ultrasound stable, awaiting blood work, follow up with endocrine    Encounter for hepatitis C screening test for low risk patient  Comments:  due for hep c antibody  Orders:  -     Hepatitis C antibody; Future  -     Hepatitis C antibody    Need for Tdap vaccination  Comments:  deferred immunization today   Orders:  -     TDAP VACCINE GREATER THAN OR EQUAL TO 6YO IM          Subjective:  Chief Complaint   Patient presents with    Cough     pt states her cough is "just a cough " She is producing some mucus, but does not know what color it is  She has had her cough for the past 6 months  Pt did not try any OTC meds  [facility] and will request records  Pt got mammo script from GYN  Pt will complete at home test for colonoscopy  FBW complete 2/22/19  Pt due for hep c screening at next blood draw  Pt defers tdap vaccine today  Annual exam due after 9/5  Patient ID: Hilda Hernández is a 63 y o  female  Cough for the past 6 months, occ sob, laughing can make her cough  Non productive, no fever  No change with exertion  Goes every other year for a thyroid ultrasound  - was stable  Blood work done yesterday with no results yet  Pt will follow up with endocrine  Review of Systems   Constitutional: Negative  Negative for fatigue and fever  HENT: Negative  Eyes: Negative  Respiratory: Positive for cough and shortness of breath  Cardiovascular: Negative  Gastrointestinal: Negative  Endocrine: Negative  Genitourinary: Negative  Musculoskeletal: Negative  Skin: Negative  Allergic/Immunologic: Negative  Neurological: Negative  Psychiatric/Behavioral: Negative  The following portions of the patient's history were reviewed and updated as appropriate: allergies, current medications, past family history, past medical history, past social history, past surgical history and problem list     Objective:  Vitals:    08/21/19 0832 08/21/19 0902   BP: 128/83 125/85   Pulse: 71    Temp: 97 6 °F (36 4 °C)    SpO2: 98%    Weight: 91 9 kg (202 lb 8 oz)    Height: 5' 7" (1 702 m)       Physical Exam   Constitutional: She is oriented to person, place, and time  She appears well-developed and well-nourished  HENT:   Head: Normocephalic and atraumatic  Cardiovascular: Normal rate, regular rhythm and normal heart sounds  No murmur heard  Pulmonary/Chest: Effort normal and breath sounds normal  No respiratory distress  She has no wheezes  She has no rales  She exhibits no tenderness  Abdominal: Soft  Bowel sounds are normal    Neurological: She is alert and oriented to person, place, and time  Skin: Skin is warm and dry  Psychiatric: She has a normal mood and affect  Her behavior is normal  Judgment and thought content normal    Nursing note and vitals reviewed  BMI Counseling: Body mass index is 31 72 kg/m²  The BMI is above normal  Nutrition recommendations include reducing portion sizes and 3-5 servings of fruits/vegetables daily  Exercise recommendations include exercising 3-5 times per week

## 2019-08-21 NOTE — PATIENT INSTRUCTIONS
Amlodipine 5mg 1 tab daily in am   hctz 25mg 1/2 tab daily after work  Monitor blood pressure 120-130/70-80

## 2019-08-26 ENCOUNTER — OFFICE VISIT (OUTPATIENT)
Dept: ENDOCRINOLOGY | Facility: HOSPITAL | Age: 59
End: 2019-08-26
Payer: COMMERCIAL

## 2019-08-26 VITALS
HEIGHT: 67 IN | BODY MASS INDEX: 32.3 KG/M2 | SYSTOLIC BLOOD PRESSURE: 124 MMHG | HEART RATE: 73 BPM | DIASTOLIC BLOOD PRESSURE: 80 MMHG | WEIGHT: 205.8 LBS

## 2019-08-26 DIAGNOSIS — E04.1 LEFT THYROID NODULE: ICD-10-CM

## 2019-08-26 DIAGNOSIS — E04.2 GOITER, NONTOXIC, MULTINODULAR: Primary | ICD-10-CM

## 2019-08-26 PROCEDURE — 99244 OFF/OP CNSLTJ NEW/EST MOD 40: CPT | Performed by: INTERNAL MEDICINE

## 2019-08-26 NOTE — PATIENT INSTRUCTIONS
Thyroid blood work is normal   The thyroid nodules are smaller and nonworrisome  The goiter is stable  Follow up in 2 years with blood work and thyroid ultrasound

## 2019-08-26 NOTE — PROGRESS NOTES
8/26/2019    Assessment/Plan      Diagnoses and all orders for this visit:    Goiter, nontoxic, multinodular  -     TSH, 3rd generation Lab Collect; Future  -     T4, free Lab Collect; Future  -     TSH, 3rd generation Lab Collect  -     T4, free Lab Collect  -     US thyroid; Future    Left thyroid nodule  -     TSH, 3rd generation Lab Collect; Future  -     T4, free Lab Collect; Future  -     TSH, 3rd generation Lab Collect  -     T4, free Lab Collect  -     US thyroid; Future        Assessment/Plan:   1  Nontoxic multinodular goiter  Most recent thyroid function tests are normal   She is biochemically and clinically euthyroid  She has negative thyroid antibodies  Most recent thyroid ultrasound shows stable size thyroid with no significant nodules  She has a very mild goiter which I do not believe is causing any of her symptoms in her throat  I reassured her regarding this  2  Left-sided thyroid nodule  She had a left dominant thyroid nodule in the past that was biopsy benign  Her current thyroid ultrasound shows no evidence of any large thyroid nodule  Serial thyroid ultrasounds will be done over time  I have asked her to follow up in 2 years with preceding TSH, free T4, and thyroid ultrasound  CC:   Thyroid nodule and goiter  consult    History of Present Illness     HPI: Andrews Clayton is a 63y o  year old female with history of   Nontoxic multinodular goiter with left-sided thyroid nodule dominant in the past  She had originally felt in 2011 as if there was something in her throat  This felt as if it was on the right side but an ear nose and throat physician could not locate anything  She was eventually found to have a nontoxic multinodular goiter  There was a dominant left-sided thyroid nodule  This was biopsied via fine-needle aspiration in 2011 and the pathology was benign  She has not been seen by Endocrinology since 2015 and is here for re-evaluation of her thyroid status    She is currently on no thyroid medications  She denies heat or cold intolerance but does still have some hot flashes  She has fatigue at times  She will have sleeping issues due to her hot flashes  She symptoms will get up to urinate at night since she takes her diuretic in the evening  She has some dry skin patches but no brittle nails or hair loss  She denies palpitation, diarrhea or constipation, anxiety or depression, or tremors  Weight is 5 lb more than 2015 and she thinks it has been climbing slowly  She denies diplopia  She has no compressive thyroid symptoms or difficulties with swallowing  She has no history of head or neck irradiation in the past     Review of Systems   Constitutional: Positive for fatigue  Negative for unexpected weight change  Fatigue at times  Weight 5 lbs more than 2015  Weight is climbing slowly  HENT: Negative for hearing loss, tinnitus and trouble swallowing  No XRT to the head or neck in the past    Eyes: Negative for visual disturbance  Wears glasses  No diplopia  Respiratory: Positive for cough  Negative for chest tightness and shortness of breath  Will at times run out of breath when talking and can cough when laughing  To vasquez BP meds  Some wheeze sound when hard exhale  No consistent pattern to this  Cardiovascular: Negative for chest pain, palpitations and leg swelling  Gastrointestinal: Negative for abdominal pain, constipation, diarrhea and nausea  Endocrine: Negative for cold intolerance and heat intolerance  Still some hot flashes  Musculoskeletal: Positive for arthralgias and back pain  Has low back pain  Shoulder pains with recent painting  Skin: Negative for rash  Some dry skin in patches  No brittle nails  No hair loss  Neurological: Positive for dizziness  Negative for tremors, light-headedness, numbness and headaches  Has vertigo at times with changing head position  Psychiatric/Behavioral: Positive for sleep disturbance  Negative for dysphoric mood  The patient is not nervous/anxious  Sleeping disturbed with hot flashes  Will get up to urinate due to taking diuretic at night  Historical Information   Past Medical History:   Diagnosis Date    Fracture of distal end of radius     Herniated nucleus pulposus, L5-S1     Hyperlipidemia     Malignant melanoma of skin ([address] )     Resolved: 2017    Migraine     Plantar fasciitis     Last Assessed: 2016     Past Surgical History:   Procedure Laterality Date     SECTION      HYSTEROSCOPY      SKIN CANCER EXCISION Left     leg, melanoma in situ    TONSILLECTOMY       Social History   Social History     Substance and Sexual Activity   Alcohol Use No    Comment: social drinker per [employer]     Social History     Substance and Sexual Activity   Drug Use No     Social History     Tobacco Use   Smoking Status Never Smoker   Smokeless Tobacco Never Used     Family History:   Family History   Problem Relation Age of Onset    Hypertension Mother     Pulmonary embolism Mother     Heart attack Father         age 43    Dementia Father     Hypertension Sister     No Known Problems Brother     Cancer Family     Lymphoma Family         Precursor B-cell Lymphoblastic of the bone    Hypertension Brother     Atrial fibrillation Brother     No Known Problems Daughter        Meds/Allergies   Current Outpatient Medications   Medication Sig Dispense Refill    amLODIPine (NORVASC) 5 mg tablet Take 1 tablet (5 mg total) by mouth daily 30 tablet 5    hydrochlorothiazide (HYDRODIURIL) 25 mg tablet Take 0 5 tablets (12 5 mg total) by mouth daily 30 tablet 5     No current facility-administered medications for this visit  No Known Allergies    Objective   Vitals: Blood pressure 124/80, pulse 73, height 5' 7" (1 702 m), weight 93 4 kg (205 lb 12 8 oz)    Invasive Devices     None                 Physical Exam   Constitutional: She is oriented to person, place, and time  She appears well-developed and well-nourished  HENT:   Head: Normocephalic and atraumatic  Eyes: Pupils are equal, round, and reactive to light  Conjunctivae and EOM are normal     No lid lag, stare, proptosis, or periorbital edema  Neck: Normal range of motion  Neck supple  No thyromegaly present  Thyroid irregular in feel but normal in size without palpable thyroid nodules  Thyroid not particularly enlarged  No bruits over the thyroid gland or carotids  Cardiovascular: Normal rate, regular rhythm and normal heart sounds  No murmur heard  Pulmonary/Chest: Effort normal and breath sounds normal  She has no wheezes  Abdominal: Soft  Bowel sounds are normal  There is no tenderness  Musculoskeletal: Normal range of motion  She exhibits no edema or deformity  No tremor of the outstretched hands  No spinous process tenderness  No CVA tenderness  Lymphadenopathy:     She has no cervical adenopathy  Neurological: She is alert and oriented to person, place, and time  She has normal reflexes  Deep tendon reflexes normal without briskness  Skin: Skin is warm and dry  No rash noted  Vitals reviewed  The history was obtained from the review of the chart and from the patient  Lab Results:    Blood work done on 08/20/2019 demonstrates the following results:  Lab Results   Component Value Date    TSH 1 180 08/20/2019    FREET4 1 13 08/20/2019     Free T3 is 3 4  Thyroid microsomal antibody is 14 and  Antithyroglobulin antibody is less than 10  THYROID ULTRASOUND Done on 08/12/2019     INDICATION:    E04 1: Nontoxic single thyroid nodule  E04 2: Nontoxic multinodular goiter  Z86 39:  Personal history of other endocrine, nutritional and metabolic disease      COMPARISON:  None     TECHNIQUE:   Ultrasound of the thyroid was performed with a high frequency linear transducer in transverse and sagittal planes including volumetric imaging sweeps as well as traditional still imaging technique      FINDINGS:  Normal homogeneous smooth echotexture      Right lobe:  5 3 x 1 9 x 1 9 cm  Volume measures 9 5 mL  Left lobe:  5 1 x 1 7 x 1 5 cm  Volume measures 6 2 mL  Isthmus:  0 4 cm  cm      Bilateral small subcentimeter low TI- RAD score nodules are visualized      IMPRESSION:     No nodule meets current ACR criteria for requiring biopsy but followup ultrasound is recommended in 2 years  No future appointments

## 2019-10-12 DIAGNOSIS — I10 ESSENTIAL HYPERTENSION: ICD-10-CM

## 2019-10-13 RX ORDER — LOSARTAN POTASSIUM AND HYDROCHLOROTHIAZIDE 12.5; 5 MG/1; MG/1
TABLET ORAL
Qty: 30 TABLET | Refills: 2 | OUTPATIENT
Start: 2019-10-13

## 2019-10-24 ENCOUNTER — TELEPHONE (OUTPATIENT)
Dept: FAMILY MEDICINE CLINIC | Facility: CLINIC | Age: 59
End: 2019-10-24

## 2019-10-25 DIAGNOSIS — I10 ESSENTIAL HYPERTENSION: Primary | ICD-10-CM

## 2019-10-25 RX ORDER — LOSARTAN POTASSIUM AND HYDROCHLOROTHIAZIDE 12.5; 5 MG/1; MG/1
1 TABLET ORAL DAILY
Qty: 90 TABLET | Refills: 3 | Status: SHIPPED | OUTPATIENT
Start: 2019-10-25 | End: 2019-10-31 | Stop reason: SDUPTHER

## 2019-10-31 DIAGNOSIS — I10 ESSENTIAL HYPERTENSION: ICD-10-CM

## 2019-11-01 RX ORDER — LOSARTAN POTASSIUM AND HYDROCHLOROTHIAZIDE 12.5; 5 MG/1; MG/1
1 TABLET ORAL DAILY
Qty: 90 TABLET | Refills: 0 | Status: SHIPPED | OUTPATIENT
Start: 2019-11-01 | End: 2019-11-02 | Stop reason: RX

## 2019-11-02 ENCOUNTER — TELEPHONE (OUTPATIENT)
Dept: FAMILY MEDICINE CLINIC | Facility: CLINIC | Age: 59
End: 2019-11-02

## 2019-11-02 DIAGNOSIS — I10 ESSENTIAL HYPERTENSION: Primary | ICD-10-CM

## 2019-11-02 RX ORDER — HYDROCHLOROTHIAZIDE 12.5 MG/1
12.5 TABLET ORAL DAILY
Qty: 90 TABLET | Refills: 1 | Status: SHIPPED | OUTPATIENT
Start: 2019-11-02 | End: 2019-11-05 | Stop reason: SDUPTHER

## 2019-11-02 RX ORDER — LOSARTAN POTASSIUM 50 MG/1
50 TABLET ORAL DAILY
Qty: 90 TABLET | Refills: 1 | Status: SHIPPED | OUTPATIENT
Start: 2019-11-02 | End: 2019-11-05 | Stop reason: SDUPTHER

## 2019-11-02 NOTE — TELEPHONE ENCOUNTER
PT PHONED REGARDING NEW RX FOR HER   LOSARTAN-HCTZ  PT STATES THE PHARMACY NOTIFIED US SEVERAL DAYS AGO THAT WE WILL NEED TO CALL IN A VERBAL ORDER  OR DR  WILL NEED TO SEND OVER  A NEW RX  THE TWO MEDICATIONS WITH DOSING AND DIRECTIONS  [facility]

## 2019-11-05 ENCOUNTER — OFFICE VISIT (OUTPATIENT)
Dept: URGENT CARE | Facility: CLINIC | Age: 59
End: 2019-11-05
Payer: COMMERCIAL

## 2019-11-05 VITALS
BODY MASS INDEX: 32.18 KG/M2 | HEART RATE: 72 BPM | DIASTOLIC BLOOD PRESSURE: 70 MMHG | SYSTOLIC BLOOD PRESSURE: 134 MMHG | WEIGHT: 205 LBS | OXYGEN SATURATION: 97 % | TEMPERATURE: 97.4 F | HEIGHT: 67 IN | RESPIRATION RATE: 18 BRPM

## 2019-11-05 DIAGNOSIS — S60.459A ACUTE FOREIGN BODY OF FINGERNAIL, INITIAL ENCOUNTER: Primary | ICD-10-CM

## 2019-11-05 DIAGNOSIS — I10 ESSENTIAL HYPERTENSION: ICD-10-CM

## 2019-11-05 PROCEDURE — 99214 OFFICE O/P EST MOD 30 MIN: CPT | Performed by: FAMILY MEDICINE

## 2019-11-05 PROCEDURE — 90471 IMMUNIZATION ADMIN: CPT | Performed by: FAMILY MEDICINE

## 2019-11-05 PROCEDURE — 90715 TDAP VACCINE 7 YRS/> IM: CPT

## 2019-11-05 RX ORDER — LOSARTAN POTASSIUM 50 MG/1
50 TABLET ORAL DAILY
Qty: 90 TABLET | Refills: 1 | Status: SHIPPED | OUTPATIENT
Start: 2019-11-05 | End: 2020-06-15 | Stop reason: SDUPTHER

## 2019-11-05 RX ORDER — LIDOCAINE HYDROCHLORIDE 10 MG/ML
1 INJECTION, SOLUTION EPIDURAL; INFILTRATION; INTRACAUDAL; PERINEURAL ONCE
Status: COMPLETED | OUTPATIENT
Start: 2019-11-05 | End: 2019-11-05

## 2019-11-05 RX ORDER — LIDOCAINE HYDROCHLORIDE AND EPINEPHRINE 10; 10 MG/ML; UG/ML
1 INJECTION, SOLUTION INFILTRATION; PERINEURAL ONCE
Status: DISCONTINUED | OUTPATIENT
Start: 2019-11-05 | End: 2019-11-05

## 2019-11-05 RX ORDER — HYDROCHLOROTHIAZIDE 12.5 MG/1
12.5 TABLET ORAL DAILY
Qty: 90 TABLET | Refills: 1 | Status: SHIPPED | OUTPATIENT
Start: 2019-11-05 | End: 2020-06-01 | Stop reason: SDUPTHER

## 2019-11-05 RX ORDER — GINSENG 100 MG
1 CAPSULE ORAL ONCE
Status: COMPLETED | OUTPATIENT
Start: 2019-11-05 | End: 2019-11-05

## 2019-11-05 RX ADMIN — LIDOCAINE HYDROCHLORIDE 5 ML: 10 INJECTION, SOLUTION EPIDURAL; INFILTRATION; INTRACAUDAL; PERINEURAL at 15:13

## 2019-11-05 RX ADMIN — Medication 1 SMALL APPLICATION: at 13:52

## 2019-11-05 NOTE — PROGRESS NOTES
NAME: Jose Rojo is a 63 y o  female  : 1960    MRN: [id number]      Assessment and Plan   Acute foreign body of fingernail, initial encounter [H41 340B]  1  Acute foreign body of fingernail, initial encounter  TDAP Vaccine greater than or equal to 6yo    bacitracin topical ointment 1 small application           Patient Instructions   Patient Instructions   F/u as needed  Tdap given      Proceed to ER if symptoms worsen  Chief Complaint     Chief Complaint   Patient presents with    Laceration     left middle nail has part of her phones screen saver stuck be hind her nail  Happened today  History of Present Illness   Trini Yuan started care of patient and I finished with the procedure  Dellis Pique here c/o L 3rd finger pain  She was removing a glass screen protector from her phone and the glass broke and went underneath her nail  Last tetanus unknown  Review of Systems   Review of Systems   Constitutional: Negative for fatigue and fever  HENT: Negative for ear pain and trouble swallowing  Eyes: Negative for visual disturbance  Respiratory: Negative for chest tightness and shortness of breath  Cardiovascular: Negative for chest pain  Gastrointestinal: Negative for abdominal pain, diarrhea, nausea and vomiting  Genitourinary: Negative for difficulty urinating and dysuria  Skin: Positive for wound  Negative for rash  Neurological: Negative for weakness and headaches  Psychiatric/Behavioral: Negative for behavioral problems and confusion           Current Medications       Current Outpatient Medications:     hydrochlorothiazide (HYDRODIURIL) 12 5 mg tablet, Take 1 tablet (12 5 mg total) by mouth daily, Disp: 90 tablet, Rfl: 1    losartan (COZAAR) 50 mg tablet, Take 1 tablet (50 mg total) by mouth daily, Disp: 90 tablet, Rfl: 1    Current Facility-Administered Medications:     bacitracin topical ointment 1 small application, 1 small application, Topical, OnceHernando MD Bennie    Current Allergies     Allergies as of 2019    (No Known Allergies)              Past Medical History:   Diagnosis Date    Fracture of distal end of radius     Herniated nucleus pulposus, L5-S1     Hyperlipidemia     Malignant melanoma of skin (HCC)     Resolved: 2017    Migraine     Plantar fasciitis     Last Assessed: 2016       Past Surgical History:   Procedure Laterality Date     SECTION      HYSTEROSCOPY      SKIN CANCER EXCISION Left     leg, melanoma in situ    TONSILLECTOMY         Family History   Problem Relation Age of Onset    Hypertension Mother     Pulmonary embolism Mother     Heart attack Father         age 36    Dementia Father     Hypertension Sister     No Known Problems Brother     Cancer Family     Lymphoma Family         Precursor B-cell Lymphoblastic of the bone    Hypertension Brother     Atrial fibrillation Brother     No Known Problems Daughter          Medications have been verified  The following portions of the patient's history were reviewed and updated as appropriate: allergies, current medications, past family history, past medical history, past social history, past surgical history and problem list     Objective   /70   Pulse 72   Temp (!) 97 4 °F (36 3 °C)   Resp 18   Ht 5' 7" (1 702 m)   Wt 93 kg (205 lb)   SpO2 97%   BMI 32 11 kg/m²        Physical Exam     Physical Exam   Constitutional: She is oriented to person, place, and time  She appears well-developed and well-nourished  HENT:   Head: Normocephalic  Eyes: EOM are normal    Neck: Normal range of motion  Cardiovascular: Normal rate, regular rhythm and normal heart sounds  Exam reveals no gallop and no friction rub  No murmur heard  Pulmonary/Chest: Effort normal and breath sounds normal  No respiratory distress  She has no wheezes  She has no rales  Abdominal: Soft  Bowel sounds are normal  She exhibits no distension   There is no tenderness  There is no rebound and no guarding  Musculoskeletal: Normal range of motion  Neurological: She is oriented to person, place, and time  Skin: Skin is warm and dry  No rash noted  L 3rd finger nail foreign body- glass TTP   Psychiatric: She has a normal mood and affect  Thought content normal    Nursing note and vitals reviewed  Foreign body removal  Date/Time: 11/5/2019 1:09 PM  Performed by: Aspen Bennett MD  Authorized by: Aspen Bennett MD   Universal Protocol:Consent: Verbal consent obtained  Risks and benefits: risks, benefits and alternatives were discussed  Consent given by: patient  Patient understanding: patient states understanding of the procedure being performed    Body area: skin  General location: upper extremity  Location details: left long finger  Anesthesia: digital block    Anesthesia:  Local Anesthetic: lidocaine 1% without epinephrine  Anesthetic total: 5 mL    Sedation:  Patient sedated: no  Patient restrained: no  Complexity: simple  1 objects recovered    Objects recovered: glass piece  Post-procedure assessment: foreign body removed  Patient tolerance: Patient tolerated the procedure well with no immediate complications

## 2019-11-07 ENCOUNTER — OFFICE VISIT (OUTPATIENT)
Dept: FAMILY MEDICINE CLINIC | Facility: CLINIC | Age: 59
End: 2019-11-07
Payer: COMMERCIAL

## 2019-11-07 VITALS
BODY MASS INDEX: 31.99 KG/M2 | HEART RATE: 72 BPM | DIASTOLIC BLOOD PRESSURE: 60 MMHG | WEIGHT: 203.8 LBS | OXYGEN SATURATION: 98 % | HEIGHT: 67 IN | RESPIRATION RATE: 16 BRPM | SYSTOLIC BLOOD PRESSURE: 110 MMHG | TEMPERATURE: 98.1 F

## 2019-11-07 DIAGNOSIS — I10 BENIGN ESSENTIAL HYPERTENSION: ICD-10-CM

## 2019-11-07 DIAGNOSIS — J04.0 LARYNGITIS: ICD-10-CM

## 2019-11-07 DIAGNOSIS — Z12.39 SCREENING FOR BREAST CANCER: ICD-10-CM

## 2019-11-07 DIAGNOSIS — K21.9 GASTROESOPHAGEAL REFLUX DISEASE WITHOUT ESOPHAGITIS: Primary | ICD-10-CM

## 2019-11-07 DIAGNOSIS — Z12.11 SCREENING FOR COLON CANCER: ICD-10-CM

## 2019-11-07 PROCEDURE — 99214 OFFICE O/P EST MOD 30 MIN: CPT | Performed by: FAMILY MEDICINE

## 2019-11-07 RX ORDER — OMEPRAZOLE 40 MG/1
40 CAPSULE, DELAYED RELEASE ORAL DAILY
Qty: 30 CAPSULE | Refills: 0 | Status: SHIPPED | OUTPATIENT
Start: 2019-11-07 | End: 2019-12-05 | Stop reason: SDUPTHER

## 2019-11-07 NOTE — PROGRESS NOTES
Subjective:   Chief Complaint   Patient presents with    Heartburn     Patient is here for acid reflux and a possible cyst on right hand, 3rd proximal digit that has ruptured, date was end of October  Patient deferred flu shot        Patient ID: Luz Maria Clark is a 63 y o  female  Pt complains of a cyst on her 3rd finger of her right hand at the cuticle  It has been there for a few years but recently was bigger and more red, drained and now improved  Slightly painful  Seen by dermatologist who advised her not to treat it  Pt would prefer doing the cologuard instead of the colonoscopy  Complains of intermittent reflux symptoms  Has tried pepcid without much relief  Noticed changes in voice, sometimes losing her voice  No fever  Mild allergy symptoms  Denies abdominal pain       The following portions of the patient's history were reviewed and updated as appropriate: allergies, current medications, past family history, past medical history, past social history, past surgical history and problem list     Review of Systems   Constitutional: Negative  Negative for fatigue and fever  HENT: Positive for voice change  Negative for sinus pain and sore throat  Eyes: Negative  Respiratory: Negative  Negative for cough  Cardiovascular: Negative  Gastrointestinal: Negative  Negative for abdominal pain  Endocrine: Negative  Genitourinary: Negative  Musculoskeletal: Negative  Skin: Negative  Cyst right finger    Allergic/Immunologic: Negative  Neurological: Negative  Psychiatric/Behavioral: Negative  Objective:  Vitals:    11/07/19 1128   BP: 110/60   Pulse: 72   Resp: 16   Temp: 98 1 °F (36 7 °C)   SpO2: 98%   Weight: 92 4 kg (203 lb 12 8 oz)   Height: 5' 7" (1 702 m)      Physical Exam   Constitutional: She is oriented to person, place, and time  She appears well-developed and well-nourished  HENT:   Head: Normocephalic and atraumatic     Right Ear: External ear normal  Left Ear: External ear normal    Nose: Nose normal    Mouth/Throat: Oropharynx is clear and moist    Slight drainage posterior pharynx   Cardiovascular: Normal rate, regular rhythm, normal heart sounds and intact distal pulses  Pulmonary/Chest: Effort normal and breath sounds normal    Abdominal: Soft  Bowel sounds are normal  There is no tenderness  There is no guarding  Musculoskeletal: She exhibits tenderness and deformity  Cyst 0 5cm at base of nail creating deformity of nail  No active infection, no drainage  Neurological: She is alert and oriented to person, place, and time  Skin: Skin is warm and dry  Psychiatric: She has a normal mood and affect  Her behavior is normal  Judgment and thought content normal    Nursing note and vitals reviewed  Diabetic Foot Exam      Assessment/Plan:    No problem-specific Assessment & Plan notes found for this encounter  Diagnoses and all orders for this visit:    Gastroesophageal reflux disease without esophagitis  Comments:  trial omeprazole 1 tab daily in am for 1-2 weeks  Orders:  -     omeprazole (PriLOSEC) 40 MG capsule; Take 1 capsule (40 mg total) by mouth daily    Laryngitis  Comments:  if no help with omeprazole, then ent evaluation     Benign essential hypertension  Comments:  controlled, continue current medication    Screening for breast cancer  Comments:  schedule mammogram  Orders:  -     Mammo screening bilateral w 3d & cad; Future    Screening for colon cancer  Comments:  cologuard  Orders:  -     Cologuard; Future    Other orders  -     Cancel: influenza vaccine, [id number], quadrivalent, recombinant, PF, 0 5 mL, for patients 18 yr+ (FLUBLOK)  -     Cancel: Ambulatory referral to Gastroenterology; Future      BMI Counseling: Body mass index is 31 92 kg/m²  The BMI is above normal  Nutrition recommendations include reducing portion sizes and 3-5 servings of fruits/vegetables daily   Exercise recommendations include exercising 3-5 times per week

## 2019-11-07 NOTE — PATIENT INSTRUCTIONS
ruben will come in mail  Schedule mammogram   Omeprazole 40mg 1 tab daily 30 min before eating or drinking   ent evaluation if no help       Gastroesophageal Reflux Disease   AMBULATORY CARE:   Gastroesophageal reflux  reflux occurs when acid and food in the stomach back up into the esophagus  Gastroesophageal reflux disease (GERD) is reflux that occurs more than twice a week for a few weeks  It usually causes heartburn and other symptoms  GERD can cause other health problems over time if it is not treated  Common symptoms include:  Heartburn is the most common symptom of GERD  You may feel burning pain in your chest or below the breast bone  This usually occurs after meals and spreads to your neck, jaw, or shoulder  The pain gets better when you change positions  You may also have any of the following:  · Bitter or acid taste in your mouth    · Dry cough    · Trouble swallowing or pain with swallowing    · Hoarseness or sore throat    · Frequent burping or hiccups    · Feeling of fullness soon after you start eating  Seek care immediately if:  · You feel full and cannot burp or vomit  · You have severe chest pain and sudden trouble breathing  · Your bowel movements are black, bloody, or tarry-looking  · Your vomit looks like coffee grounds or has blood in it  Contact your healthcare provider if:   · You vomit large amounts, or you vomit often  · You have trouble breathing after you vomit  · You have trouble swallowing, or pain with swallowing  · You are losing weight without trying  · Your symptoms get worse or do not improve with treatment  · You have questions or concerns about your condition or care  Treatment for GERD:  Your healthcare provider may prescribe medicine to decrease stomach acid  He may also prescribe medicine that help your esophagus and stomach move food and liquid to your intestines  Surgery may be done if other treatments do not work   You may need surgery to wrap the upper part of the stomach around the esophageal sphincter  This will strengthen the sphincter and prevent reflux  Manage GERD:   · Do not have foods or drinks that may increase heartburn  These include chocolate, peppermint, fried or fatty foods, drinks that contain caffeine, or carbonated drinks (soda)  Other foods include spicy foods, onions, tomatoes, and tomato-based foods  Do not have foods or drinks that can irritate your esophagus, such as citrus fruits, juices, and alcohol  · Do not eat large meals  When you eat a lot of food at one time, your stomach needs more acid to digest it  Eat 6 small meals each day instead of 3 large ones, and eat slowly  Do not eat meals 2 to 3 hours before bedtime  · Elevate the head of your bed  Place 6-inch blocks under the head of your bed frame  You may also use more than one pillow under your head and shoulders while you sleep  · Maintain a healthy weight  If you are overweight, weight loss may help relieve symptoms of GERD  · Do not smoke  Smoking weakens the lower esophageal sphincter and increases the risk of GERD  Ask your healthcare provider for information if you currently smoke and need help to quit  E-cigarettes or smokeless tobacco still contain nicotine  Talk to your healthcare provider before you use these products  · Do not wear clothing that is tight around your waist   Tight clothing can put pressure on your stomach and cause or worsen GERD symptoms  Follow up with your healthcare provider as directed:  Write down your questions so you remember to ask them during your visits  © 2017 [employer] Information is for End User's use only and may not be sold, redistributed or otherwise used for commercial purposes  All illustrations and images included in CareNotes® are the copyrighted property of [employer]  or [employer]onal[employer]  The above information is an  only   It is not intended as medical advice for individual conditions or treatments  Talk to your doctor, nurse or pharmacist before following any medical regimen to see if it is safe and effective for you

## 2019-11-08 PROBLEM — Z12.11 SCREENING FOR COLON CANCER: Status: ACTIVE | Noted: 2019-11-08

## 2019-11-08 PROBLEM — J04.0 LARYNGITIS: Status: ACTIVE | Noted: 2019-11-08

## 2019-11-08 PROBLEM — R05.9 COUGH: Status: RESOLVED | Noted: 2019-08-21 | Resolved: 2019-11-08

## 2019-11-08 PROBLEM — Z12.39 SCREENING FOR BREAST CANCER: Status: ACTIVE | Noted: 2019-11-08

## 2019-11-08 PROBLEM — Z23 NEED FOR TDAP VACCINATION: Status: RESOLVED | Noted: 2019-08-21 | Resolved: 2019-11-08

## 2019-11-08 PROBLEM — K21.9 GASTROESOPHAGEAL REFLUX DISEASE WITHOUT ESOPHAGITIS: Status: ACTIVE | Noted: 2019-11-08

## 2019-11-08 PROBLEM — Z23 NEED FOR VACCINATION: Status: ACTIVE | Noted: 2019-11-08

## 2019-12-05 DIAGNOSIS — K21.9 GASTROESOPHAGEAL REFLUX DISEASE WITHOUT ESOPHAGITIS: ICD-10-CM

## 2019-12-05 RX ORDER — OMEPRAZOLE 40 MG/1
CAPSULE, DELAYED RELEASE ORAL
Qty: 90 CAPSULE | Refills: 0 | Status: SHIPPED | OUTPATIENT
Start: 2019-12-05 | End: 2019-12-17 | Stop reason: ALTCHOICE

## 2019-12-12 ENCOUNTER — CLINICAL SUPPORT (OUTPATIENT)
Dept: FAMILY MEDICINE CLINIC | Facility: CLINIC | Age: 59
End: 2019-12-12
Payer: COMMERCIAL

## 2019-12-12 DIAGNOSIS — E04.2 GOITER, NONTOXIC, MULTINODULAR: Primary | ICD-10-CM

## 2019-12-12 DIAGNOSIS — Z11.59 ENCOUNTER FOR HEPATITIS C SCREENING TEST FOR LOW RISK PATIENT: ICD-10-CM

## 2019-12-12 DIAGNOSIS — E04.1 SINGLE THYROID NODULE: ICD-10-CM

## 2019-12-12 PROCEDURE — 36415 COLL VENOUS BLD VENIPUNCTURE: CPT

## 2019-12-13 LAB — HCV AB S/CO SERPL IA: <0.1 S/CO RATIO (ref 0–0.9)

## 2019-12-17 ENCOUNTER — OFFICE VISIT (OUTPATIENT)
Dept: FAMILY MEDICINE CLINIC | Facility: CLINIC | Age: 59
End: 2019-12-17
Payer: COMMERCIAL

## 2019-12-17 VITALS
TEMPERATURE: 97.7 F | OXYGEN SATURATION: 98 % | DIASTOLIC BLOOD PRESSURE: 72 MMHG | HEIGHT: 67 IN | SYSTOLIC BLOOD PRESSURE: 108 MMHG | HEART RATE: 72 BPM | BODY MASS INDEX: 31.55 KG/M2 | WEIGHT: 201 LBS

## 2019-12-17 DIAGNOSIS — I10 BENIGN ESSENTIAL HYPERTENSION: ICD-10-CM

## 2019-12-17 DIAGNOSIS — Z23 NEED FOR INFLUENZA VACCINATION: ICD-10-CM

## 2019-12-17 DIAGNOSIS — Z00.00 WELL ADULT EXAM: Primary | ICD-10-CM

## 2019-12-17 DIAGNOSIS — R13.12 OROPHARYNGEAL DYSPHAGIA: ICD-10-CM

## 2019-12-17 PROBLEM — Z12.39 SCREENING FOR BREAST CANCER: Status: RESOLVED | Noted: 2019-11-08 | Resolved: 2019-12-17

## 2019-12-17 PROBLEM — Z11.59 ENCOUNTER FOR HEPATITIS C SCREENING TEST FOR LOW RISK PATIENT: Status: RESOLVED | Noted: 2019-08-21 | Resolved: 2019-12-17

## 2019-12-17 PROCEDURE — 99396 PREV VISIT EST AGE 40-64: CPT | Performed by: FAMILY MEDICINE

## 2019-12-17 NOTE — PROGRESS NOTES
Subjective     Alia Hernandez is a 63 y o   female and is here for routine health maintenance  The patient reports no problems  History of Present Illness     Pt here for physical today  Going to the gym  [employer]  Adjusting diet  Lost 4 pounds since the beginning of November  Denies chest pain or shortness of breath  Has some spider veins on her legs  Well Adult Physical   Patient here for a comprehensive physical exam       Diet and Physical Activity  Diet: well balanced diet  Weight concerns: Patient has class 1 obesity (BMI 30-34  9)  Exercise: frequently      Depression Screen  PHQ-9 Depression Screening    PHQ-9:    Frequency of the following problems over the past two weeks:               General Health  Hearing: Normal:  bilateral  Vision: no vision problems  Dental: regular dental visits     History:  LMP: No LMP recorded  Patient is postmenopausal       Cancer Screening  Colononoscopy - pt will do cologuard   Mammogram 11/29/2019  Pap 7/2019  Abnormal pap? no  Smoker NO Annual screening with low-dose helical computed tomography (CT) for patients age 54 to 76 years with history of smoking at least 30 pack-years and, if a former smoker, had quit within the previous 15 years      The following portions of the patient's history were reviewed and updated as appropriate: allergies, current medications, past family history, past medical history, past social history, past surgical history and problem list     Review of Systems     Review of Systems   Constitutional: Negative  Negative for fatigue and fever  HENT: Negative  Eyes: Negative  Respiratory: Negative  Negative for cough  Cardiovascular: Negative  Gastrointestinal: Negative  Endocrine: Negative  Genitourinary: Negative  Musculoskeletal: Negative  Skin: Negative  Allergic/Immunologic: Negative  Neurological: Negative  Psychiatric/Behavioral: Negative          Past Medical History     Past Medical History: Diagnosis Date    Fracture of distal end of radius     Herniated nucleus pulposus, L5-S1     Hyperlipidemia     Malignant melanoma of skin (HCC)     Resolved: 2017    Migraine     Plantar fasciitis     Last Assessed: 2016       Past Surgical History     Past Surgical History:   Procedure Laterality Date     SECTION      HYSTEROSCOPY      SKIN CANCER EXCISION Left     leg, melanoma in situ    TONSILLECTOMY         Social History     Social History     Socioeconomic History    Marital status: /Civil Union     Spouse name: None    Number of children: None    Years of education: None    Highest education level: None   Occupational History    Occupation:    Social Needs    Financial resource strain: None    Food insecurity:     Worry: None     Inability: None    Transportation needs:     Medical: None     Non-medical: None   Tobacco Use    Smoking status: Never Smoker    Smokeless tobacco: Never Used   Substance and Sexual Activity    Alcohol use: No     Comment: social drinker per [employer]    Drug use: No    Sexual activity: None   Lifestyle    Physical activity:     Days per week: None     Minutes per session: None    Stress: None   Relationships    Social connections:     Talks on phone: None     Gets together: None     Attends [employer] service: None     Active member of club or organization: None     Attends meetings of clubs or organizations: None     Relationship status: None    Intimate partner violence:     Fear of current or ex partner: None     Emotionally abused: None     Physically abused: None     Forced sexual activity: None   Other Topics Concern    None   Social History Narrative    None       Family History     Family History   Problem Relation Age of Onset    Hypertension Mother     Pulmonary embolism Mother     Heart attack Father         age 43    Dementia Father     Hypertension Sister     No Known Problems Brother     Cancer Family     Lymphoma Family         Precursor B-cell Lymphoblastic of the bone    Hypertension Brother     Atrial fibrillation Brother     No Known Problems Daughter        Current Medications       Current Outpatient Medications:     hydrochlorothiazide (HYDRODIURIL) 12 5 mg tablet, Take 1 tablet (12 5 mg total) by mouth daily, Disp: 90 tablet, Rfl: 1    losartan (COZAAR) 50 mg tablet, Take 1 tablet (50 mg total) by mouth daily, Disp: 90 tablet, Rfl: 1     Allergies     No Known Allergies    Objective     /72   Pulse 72   Temp 97 7 °F (36 5 °C)   Ht 5' 7" (1 702 m)   Wt 91 2 kg (201 lb)   SpO2 98%   BMI 31 48 kg/m²      Physical Exam   Constitutional: She is oriented to person, place, and time  She appears well-developed and well-nourished  HENT:   Head: Normocephalic and atraumatic  Right Ear: External ear normal    Left Ear: External ear normal    Nose: Nose normal    Mouth/Throat: Oropharynx is clear and moist    Eyes: Pupils are equal, round, and reactive to light  Conjunctivae are normal    Neck: Normal range of motion  Neck supple  Cardiovascular: Normal rate, regular rhythm, normal heart sounds and intact distal pulses  Pulmonary/Chest: Effort normal and breath sounds normal    Abdominal: Soft  Bowel sounds are normal    Neurological: She is alert and oriented to person, place, and time  Skin: Skin is warm and dry  Psychiatric: She has a normal mood and affect  Her behavior is normal  Judgment and thought content normal    Nursing note and vitals reviewed          No exam data present    Health Maintenance     Health Maintenance   Topic Date Due    HIV Screening  02/11/1975    Influenza Vaccine  01/13/2020 (Originally 7/1/2019)    BMI: Followup Plan  11/03/2020    Depression Screening PHQ  11/05/2020    MAMMOGRAM  11/29/2020    BMI: Adult  12/17/2020    Cervical Cancer Screening  07/17/2024    Pneumococcal Vaccine: 65+ Years (1 of 2 - PCV13) 02/11/2026    DTaP,Tdap,and Td Vaccines (2 - Td) 11/05/2029    Hepatitis C Screening  Completed    Pneumococcal Vaccine: Pediatrics (0 to 5 Years) and At-Risk Patients (6 to 59 Years)  Aged Out    HIB Vaccine  Aged Out    Hepatitis B Vaccine  Aged Out    IPV Vaccine  Aged Out    Hepatitis A Vaccine  Aged Out    Meningococcal ACWY Vaccine  Aged Out    HPV Vaccine  Aged [employer] History   Administered Date(s) Administered    Tdap 11/05/2019       Assessment/Plan         1  Healthy female exam   2  Patient Counseling:   · Nutrition: Stressed importance of a well balanced diet, moderation of sodium/saturated fat, caloric balance and sufficient intake of fiber  · Exercise: Stressed the importance of regular exercise with a goal of 150 minutes per week  · Dental Health: Discussed daily flossing and brushing and regular dental visits     · Immunizations reviewed  · Discussed benefits of screening   · Discussed the patient's BMI with her  The BMI is above average; BMI management plan is completed  3  Cancer Screening   4  Labs after 2/22/2020   5  Follow up in one year      Leonid Singh DO

## 2019-12-17 NOTE — PATIENT INSTRUCTIONS
Blood work after 2/22/2020, ok for screening blood work         [facility]   A wellness visit  is when you see your healthcare provider to get screened for health problems  You can also get advice on how to stay healthy  Write down your questions so you remember to ask them  Ask your healthcare provider how often you should have a wellness visit  What happens at a wellness visit:  Your healthcare provider will ask about your health, and your family history of health problems  This includes high blood pressure, heart disease, and cancer  He or she will ask if you have symptoms that concern you, if you smoke, and about your mood  You may also be asked about your intake of medicines, supplements, food, and alcohol  Any of the following may be done:  · Your weight  will be checked  Your height may also be checked so your body mass index (BMI) can be calculated  Your BMI shows if you are at a healthy weight  · Your blood pressure  and heart rate will be checked  Your temperature may also be checked  · Blood and urine tests  may be done  Blood tests may be done to check your cholesterol levels  Abnormal cholesterol levels increase your risk for heart disease and stroke  You may also need a blood or urine test to check for diabetes if you are at increased risk  Urine tests may be done to look for signs of an infection or kidney disease  · A physical exam  includes checking your heartbeat and lungs with a stethoscope  Your healthcare provider may also check your skin to look for sun damage  · Screening tests  may be recommended  A screening test is done to check for diseases that may not cause symptoms  The screening tests you may need depend on your age, gender, family history, and lifestyle habits  For example, colorectal screening may be recommended if you are 48years old or older    Screening tests you need if you are a woman:   · A Pap smear  is used to screen for cervical cancer  Pap smears are usually done every 3 to 5 years depending on your age  You may need them more often if you have had abnormal Pap smear test results in the past  Ask your healthcare provider how often you should have a Pap smear  · A mammogram  is an x-ray of your breasts to screen for breast cancer  Experts recommend mammograms every 2 years starting at age 48 years  You may need a mammogram at age 52 years or younger if you have an increased risk for breast cancer  Talk to your healthcare provider about when you should start having mammograms and how often you need them  Vaccines you may need:   · Get an influenza vaccine  every year  The influenza vaccine protects you from the flu  Several types of viruses cause the flu  The viruses change over time, so new vaccines are made each year  · Get a tetanus-diphtheria (Td) booster vaccine  every 10 years  This vaccine protects you against tetanus and diphtheria  Tetanus is a severe infection that may cause painful muscle spasms and lockjaw  Diphtheria is a severe bacterial infection that causes a thick covering in the back of your mouth and throat  · Get a human papillomavirus (HPV) vaccine  if you are female and aged 23 to 32 or male 23 to 24 and never received it  This vaccine protects you from HPV infection  HPV is the most common infection spread by sexual contact  HPV may also cause vaginal, penile, and anal cancers  · Get a pneumococcal vaccine  if you are aged 72 years or older  The pneumococcal vaccine is an injection given to protect you from pneumococcal disease  Pneumococcal disease is an infection caused by pneumococcal bacteria  The infection may cause pneumonia, meningitis, or an ear infection  · Get a shingles vaccine  if you are aged [address] or older, even if you have had shingles before  The shingles vaccine is an injection to protect you from the varicella-zoster virus  This is the same virus that causes chickenpox  Shingles is a painful rash that develops in people who had chickenpox or have been exposed to the virus  How to eat healthy:  My Plate is a model for planning healthy meals  It shows the types and amounts of foods that should go on your plate  Fruits and vegetables make up about half of your plate, and grains and protein make up the other half  A serving of dairy is included on the side of your plate  The amount of calories and serving sizes you need depends on your age, gender, weight, and height  Examples of healthy foods are listed below:  · Eat a variety of vegetables  such as dark green, red, and orange vegetables  You can also include canned vegetables low in sodium (salt) and frozen vegetables without added butter or sauces  · Eat a variety of fresh fruits , canned fruit in 100% juice, frozen fruit, and dried fruit  · Include whole grains  At least half of the grains you eat should be whole grains  Examples include whole-wheat bread, wheat pasta, brown rice, and whole-grain cereals such as oatmeal     · Eat a variety of protein foods such as seafood (fish and shellfish), lean meat, and poultry without skin (turkey and chicken)  Examples of lean meats include pork leg, shoulder, or tenderloin, and beef round, sirloin, tenderloin, and extra lean ground beef  Other protein foods include eggs and egg substitutes, beans, peas, soy products, nuts, and seeds  · Choose low-fat dairy products such as skim or 1% milk or low-fat yogurt, cheese, and cottage cheese  · Limit unhealthy fats  such as butter, hard margarine, and shortening  Exercise:  Exercise at least 30 minutes per day on most days of the week  Some examples of exercise include walking, biking, dancing, and swimming  You can also fit in more physical activity by taking the stairs instead of the elevator or parking farther away from stores  Include muscle strengthening activities 2 days each week   Regular exercise provides many health benefits  It helps you manage your weight, and decreases your risk for type 2 diabetes, heart disease, stroke, and high blood pressure  Exercise can also help improve your mood  Ask your healthcare provider about the best exercise plan for you  General health and safety guidelines:   · Do not smoke  Nicotine and other chemicals in cigarettes and cigars can cause lung damage  Ask your healthcare provider for information if you currently smoke and need help to quit  E-cigarettes or smokeless tobacco still contain nicotine  Talk to your healthcare provider before you use these products  · Limit alcohol  A drink of alcohol is 12 ounces of beer, 5 ounces of wine, or 1½ ounces of liquor  · Lose weight, if needed  Being overweight increases your risk of certain health conditions  These include heart disease, high blood pressure, type 2 diabetes, and certain types of cancer  · Protect your skin  Do not sunbathe or use tanning beds  Use sunscreen with a SPF 15 or higher  Apply sunscreen at least 15 minutes before you go outside  Reapply sunscreen every 2 hours  Wear protective clothing, hats, and sunglasses when you are outside  · Drive safely  Always wear your seatbelt  Make sure everyone in your car wears a seatbelt  A seatbelt can save your life if you are in an accident  Do not use your cell phone when you are driving  This could distract you and cause an accident  Pull over if you need to make a call or send a text message  · Practice safe sex  Use latex condoms if are sexually active and have more than one partner  Your healthcare provider may recommend screening tests for sexually transmitted infections (STIs)  · Wear helmets, lifejackets, and protective gear  Always wear a helmet when you ride a bike or motorcycle, go skiing, or play sports that could cause a head injury  Wear protective equipment when you play sports  Wear a lifejacket when you are on a boat or doing water sports    © 2017 [address] [facility] Information is for End User's use only and may not be sold, redistributed or otherwise used for commercial purposes  All illustrations and images included in CareNotes® are the copyrighted property of A D A M , Inc  or Ronaldo Kohli  The above information is an  only  It is not intended as medical advice for individual conditions or treatments  Talk to your doctor, nurse or pharmacist before following any medical regimen to see if it is safe and effective for you

## 2020-01-02 ENCOUNTER — TELEPHONE (OUTPATIENT)
Dept: FAMILY MEDICINE CLINIC | Facility: CLINIC | Age: 60
End: 2020-01-02

## 2020-01-02 NOTE — TELEPHONE ENCOUNTER
Lmom to call  Patient's cologuard stability limit had been exceeded  The patient will need to initiate a new sample  We will have cologuard submit a new sample collection to the patient

## 2020-03-31 ENCOUNTER — TELEMEDICINE (OUTPATIENT)
Dept: ENDOCRINOLOGY | Facility: HOSPITAL | Age: 60
End: 2020-03-31
Payer: COMMERCIAL

## 2020-03-31 DIAGNOSIS — E04.1 LEFT THYROID NODULE: ICD-10-CM

## 2020-03-31 DIAGNOSIS — E04.2 GOITER, NONTOXIC, MULTINODULAR: Primary | ICD-10-CM

## 2020-03-31 PROCEDURE — 99213 OFFICE O/P EST LOW 20 MIN: CPT | Performed by: INTERNAL MEDICINE

## 2020-03-31 NOTE — PROGRESS NOTES
Virtual Regular Visit    Problem List Items Addressed This Visit        Endocrine    Left thyroid nodule    Goiter, nontoxic, multinodular - Primary               Reason for visit is thyroid nodule and goiter follow-up    Encounter provider Tani Charles MD    Provider located at [address] [place] [address] [address] [place] [address]      Recent Visits  No visits were found meeting these conditions  Showing recent visits within past 7 days and meeting all other requirements     Today's Visits  Date Type Provider Dept   03/31/20 Telemedicine Tani Charles MD [facility]   Showing today's visits and meeting all other requirements     Future Appointments  No visits were found meeting these conditions  Showing future appointments within next 150 days and meeting all other requirements        The patient was identified by name and date of birth  Hitesh Cosme was informed that this is a telemedicine visit and that the visit is being conducted through telephone  My office door was closed  No one else was in the room  She acknowledged consent and understanding of privacy and security of the video platform  The patient has agreed to participate and understands they can discontinue the visit at any time  Patient is aware this is a billable service  Subjective  Hitesh Cosme is a 62 y o  female with nontoxic multinodular goiter and history of left thyroid nodule biopsy benign follow-up visit  She was seen in August 2019 and was not due to be seen and till the summer 2021 but was concerned about some symptomatology she was having so requested visit  She feels like there is something in her throat  It is in the area of where the thyroid is  Sometimes it is better than other times  It is there constantly  She had no compressive thyroid symptoms   PCP changed her blood pressure medicine to see if it would help  She tried acid reflex medicine per PCP with no help  She has not yet tried allergy medications  She has not yet seen an ENT  She reports that she does tend to clear her throat a lot and has postnasal drip  She says her voice will cut out if she speaks for long periods of time but has no other voice changes or hoarse voice  She denies hearing problems but has some intermittent tinnitus  She does at times feel as if there is pressure in her throat if she pushes on the front of her throat  She has no compressive thyroid symptoms or difficulties with swallowing  She denies heat or cold intolerance but does get some hot flashes  Hot flashes can occur at night which wake her at night and other times will occur after she urinates at night  She does have troubles with sleeping due to her  and she has a light sleeper in general   She denies palpitations, diarrhea constipation, anxiety or depression  She denies fatigue, weight changes, dry skin, brittle nails, or hair loss  She does have a slight hand tremor when she holds objects for long periods of time tightly  She has no diplopia  Past Medical History:   Diagnosis Date    Fracture of distal end of radius     Herniated nucleus pulposus, L5-S1     Hyperlipidemia     Malignant melanoma of skin (HCC)     Resolved: 2017    Migraine     Plantar fasciitis     Last Assessed: 2016       Past Surgical History:   Procedure Laterality Date     SECTION      HYSTEROSCOPY      SKIN CANCER EXCISION Left     leg, melanoma in situ    TONSILLECTOMY         Current Outpatient Medications   Medication Sig Dispense Refill    hydrochlorothiazide (HYDRODIURIL) 12 5 mg tablet Take 1 tablet (12 5 mg total) by mouth daily 90 tablet 1    losartan (COZAAR) 50 mg tablet Take 1 tablet (50 mg total) by mouth daily 90 tablet 1     No current facility-administered medications for this visit           No Known Allergies    Review of Systems   Constitutional: Negative for fatigue and unexpected weight change  HENT: Positive for tinnitus and voice change  Negative for hearing loss and trouble swallowing  Tends to clear throat a lot, has PND  Has some tinnitus  Will feel as if voice cuts when talking for a long time, this is less than a few moths ago  Feels pressure in throat when presses on the front of the throat  Eyes: Negative for visual disturbance  No diplopia  Respiratory: Negative for chest tightness and shortness of breath  Cardiovascular: Negative for chest pain and palpitations  Gastrointestinal: Negative for abdominal pain, constipation, diarrhea and nausea  Endocrine: Negative for cold intolerance and heat intolerance  Has hot flashes at times  Musculoskeletal: Negative for back pain  Skin: Negative for rash  No dry skin  No hair loss  No brittle nails  Neurological: Positive for tremors  Negative for dizziness, light-headedness and headaches  Slight unchanged hand tremor when holding objects tightly  Psychiatric/Behavioral: Positive for sleep disturbance  Negative for dysphoric mood  The patient is not nervous/anxious  A light sleeper in general and  hot person and she wakes at night and can wake with heat flashes, she can get a heat flash in bed after she gets up to urinate at night  Physical Exam not performed as this was a telephone telemedicine visit  Blood work:  Last blood work done on 08/20/2019 showed a TSH of 1 18 with a free T4 1 13 and a free T3 of 3 4  Thyroglobulin antibodies are less than 1 and antimicrosomal antibodies are 14  Thyroid ultrasound:  THYROID ULTRASOUND performed on 08/12/2019     INDICATION:    E04 1: Nontoxic single thyroid nodule  E04 2: Nontoxic multinodular goiter  [id number]:  Personal history of other endocrine, nutritional and metabolic disease      COMPARISON:  None     TECHNIQUE:   Ultrasound of the thyroid was performed with a high frequency linear transducer in transverse and sagittal planes including volumetric imaging sweeps as well as traditional still imaging technique      FINDINGS:  Normal homogeneous smooth echotexture      Right lobe:  5 3 x 1 9 x 1 9 cm  Volume measures 9 5 mL  Left lobe:  5 1 x 1 7 x 1 5 cm  Volume measures 6 2 mL  Isthmus:  0 4 cm  cm      Bilateral small subcentimeter low TI- RAD score nodules are visualized      IMPRESSION:     No nodule meets current ACR criteria for requiring biopsy but followup ultrasound is recommended in 2 years  Assessment and plan:  1  Nontoxic multinodular goiter  The thyroid ultrasound done last year does show some mild enlargement of her thyroid with no large nodules above 1 cm  Thyroid function tests were normal   She has no underlying autoimmune disease  I reassured her again that I do not think that her thyroid is causing these symptoms given its size  I have a feeling it is more related to allergies and I have asked her to try an over-the-counter 24 hour non drowsy allergy medicine such as Zyrtec, alavert, or Claritin daily for several weeks to see if the symptoms improve  If the symptoms do not improve then I would recommend your nose and throat evaluation at this point  I have asked her to follow up as she was previously scheduled to in the summer 2021 with preceding blood work consisting of a TSH, free T4 along with thyroid ultrasound          I spent 20 minutes with the patient during this visit      Established patient follow-up visit level 3

## 2020-03-31 NOTE — LETTER
March 31, 2020     Scooter Mcintyre, [address] [facility] [address]    Patient: Alonzo Pope   YOB: 1960   Date of Visit: 3/31/2020       Dear Dr Corie Cooley: Thank you for referring Alonzo Pope to me for evaluation  Below are my notes for this consultation  If you have questions, please do not hesitate to call me  I look forward to following your patient along with you  Sincerely,        Geovani Stevenson MD        CC: No Recipients  Geovani Stevenson MD  3/31/2020  9:47 AM  Sign at close encounter  Virtual Regular Visit    Problem List Items Addressed This Visit        Endocrine    Left thyroid nodule    Goiter, nontoxic, multinodular - Primary               Reason for visit is thyroid nodule and goiter follow-up    Encounter provider Geovani Stevenson MD    Provider located at [address] [place] [address] [address] [place] [address]      Recent Visits  No visits were found meeting these conditions  Showing recent visits within past 7 days and meeting all other requirements     Today's Visits  Date Type Provider Dept   03/31/20 Telemedicine Geovani Stevenson MD [facility]   Showing today's visits and meeting all other requirements     Future Appointments  No visits were found meeting these conditions  Showing future appointments within next 150 days and meeting all other requirements        The patient was identified by name and date of birth  Alonzo Pope was informed that this is a telemedicine visit and that the visit is being conducted through telephone  My office door was closed  No one else was in the room  She acknowledged consent and understanding of privacy and security of the video platform  The patient has agreed to participate and understands they can discontinue the visit at any time  Patient is aware this is a billable service  Richy Bates is a 62 y o  female with nontoxic multinodular goiter and history of left thyroid nodule biopsy benign follow-up visit  She was seen in August 2019 and was not due to be seen and till the summer 2021 but was concerned about some symptomatology she was having so requested visit  She feels like there is something in her throat  It is in the area of where the thyroid is  Sometimes it is better than other times  It is there constantly  She had no compressive thyroid symptoms  PCP changed her blood pressure medicine to see if it would help  She tried acid reflex medicine per PCP with no help  She has not yet tried allergy medications  She has not yet seen an ENT  She reports that she does tend to clear her throat a lot and has postnasal drip  She says her voice will cut out if she speaks for long periods of time but has no other voice changes or hoarse voice  She denies hearing problems but has some intermittent tinnitus  She does at times feel as if there is pressure in her throat if she pushes on the front of her throat  She has no compressive thyroid symptoms or difficulties with swallowing  She denies heat or cold intolerance but does get some hot flashes  Hot flashes can occur at night which wake her at night and other times will occur after she urinates at night  She does have troubles with sleeping due to her  and she has a light sleeper in general   She denies palpitations, diarrhea constipation, anxiety or depression  She denies fatigue, weight changes, dry skin, brittle nails, or hair loss  She does have a slight hand tremor when she holds objects for long periods of time tightly  She has no diplopia        Past Medical History:   Diagnosis Date    Fracture of distal end of radius     Herniated nucleus pulposus, L5-S1     Hyperlipidemia     Malignant melanoma of skin ([facility] [address] )     Resolved: 9/12/2017    Migraine     Plantar fasciitis     Last Assessed: 2016       Past Surgical History:   Procedure Laterality Date     SECTION      HYSTEROSCOPY      SKIN CANCER EXCISION Left     leg, melanoma in situ    TONSILLECTOMY         Current Outpatient Medications   Medication Sig Dispense Refill    hydrochlorothiazide (HYDRODIURIL) 12 5 mg tablet Take 1 tablet (12 5 mg total) by mouth daily 90 tablet 1    losartan (COZAAR) 50 mg tablet Take 1 tablet (50 mg total) by mouth daily 90 tablet 1     No current facility-administered medications for this visit  No Known Allergies    Review of Systems   Constitutional: Negative for fatigue and unexpected weight change  HENT: Positive for tinnitus and voice change  Negative for hearing loss and trouble swallowing  Tends to clear throat a lot, has PND  Has some tinnitus  Will feel as if voice cuts when talking for a long time, this is less than a few moths ago  Feels pressure in throat when presses on the front of the throat  Eyes: Negative for visual disturbance  No diplopia  Respiratory: Negative for chest tightness and shortness of breath  Cardiovascular: Negative for chest pain and palpitations  Gastrointestinal: Negative for abdominal pain, constipation, diarrhea and nausea  Endocrine: Negative for cold intolerance and heat intolerance  Has hot flashes at times  Musculoskeletal: Negative for back pain  Skin: Negative for rash  No dry skin  No hair loss  No brittle nails  Neurological: Positive for tremors  Negative for dizziness, light-headedness and headaches  Slight unchanged hand tremor when holding objects tightly  Psychiatric/Behavioral: Positive for sleep disturbance  Negative for dysphoric mood  The patient is not nervous/anxious  A light sleeper in general and  hot person and she wakes at night and can wake with heat flashes, she can get a heat flash in bed after she gets up to urinate at night         Physical Exam not performed as this was a telephone telemedicine visit  Blood work:  Last blood work done on 08/20/2019 showed a TSH of 1 18 with a free T4 1 13 and a free T3 of 3 4  Thyroglobulin antibodies are less than 1 and antimicrosomal antibodies are 14  Thyroid ultrasound:  THYROID ULTRASOUND performed on 08/12/2019     INDICATION:    E04 1: Nontoxic single thyroid nodule  E04 2: Nontoxic multinodular goiter  Z86 39: Personal history of other endocrine, nutritional and metabolic disease      COMPARISON:  None     TECHNIQUE:   Ultrasound of the thyroid was performed with a high frequency linear transducer in transverse and sagittal planes including volumetric imaging sweeps as well as traditional still imaging technique      FINDINGS:  Normal homogeneous smooth echotexture      Right lobe:  5 3 x 1 9 x 1 9 cm  Volume measures 9 5 mL  Left lobe:  5 1 x 1 7 x 1 5 cm  Volume measures 6 2 mL  Isthmus:  0 4 cm  cm      Bilateral small subcentimeter low TI- RAD score nodules are visualized      IMPRESSION:     No nodule meets current ACR criteria for requiring biopsy but followup ultrasound is recommended in 2 years  Assessment and plan:  1  Nontoxic multinodular goiter  The thyroid ultrasound done last year does show some mild enlargement of her thyroid with no large nodules above 1 cm  Thyroid function tests were normal   She has no underlying autoimmune disease  I reassured her again that I do not think that her thyroid is causing these symptoms given its size  I have a feeling it is more related to allergies and I have asked her to try an over-the-counter 24 hour non drowsy allergy medicine such as Zyrtec, alavert, or Claritin daily for several weeks to see if the symptoms improve  If the symptoms do not improve then I would recommend your nose and throat evaluation at this point        I have asked her to follow up as she was previously scheduled to in the summer 2021 with preceding blood work consisting of a TSH, free T4 along with thyroid ultrasound          I spent 20 minutes with the patient during this visit      Established patient follow-up visit level 3

## 2020-03-31 NOTE — PATIENT INSTRUCTIONS
Try an over the counter allergy medicine like claritin or zyrtec, or alavert or the generic for 2-3 weeks to see if the symptoms improve  If the symptoms do not, then see an ear, nose, and throat doctor for evaluation  I do not think the thyroid is the problem given the thyroid is not that large and the nodules are very small  I would have you follow up in the summer of 2021with blood work and thyroid ultrasound as scheduled in 2021

## 2020-06-01 DIAGNOSIS — I10 ESSENTIAL HYPERTENSION: ICD-10-CM

## 2020-06-01 RX ORDER — HYDROCHLOROTHIAZIDE 12.5 MG/1
12.5 TABLET ORAL DAILY
Qty: 90 TABLET | Refills: 0 | Status: SHIPPED | OUTPATIENT
Start: 2020-06-01 | End: 2020-08-12 | Stop reason: SDUPTHER

## 2020-06-15 ENCOUNTER — OFFICE VISIT (OUTPATIENT)
Dept: FAMILY MEDICINE CLINIC | Facility: CLINIC | Age: 60
End: 2020-06-15
Payer: COMMERCIAL

## 2020-06-15 VITALS
HEART RATE: 71 BPM | HEIGHT: 67 IN | OXYGEN SATURATION: 97 % | DIASTOLIC BLOOD PRESSURE: 78 MMHG | SYSTOLIC BLOOD PRESSURE: 118 MMHG | RESPIRATION RATE: 16 BRPM | WEIGHT: 197 LBS | TEMPERATURE: 99.3 F | BODY MASS INDEX: 30.92 KG/M2

## 2020-06-15 DIAGNOSIS — I10 ESSENTIAL HYPERTENSION: Primary | ICD-10-CM

## 2020-06-15 DIAGNOSIS — E04.2 GOITER, NONTOXIC, MULTINODULAR: ICD-10-CM

## 2020-06-15 DIAGNOSIS — E04.1 LEFT THYROID NODULE: ICD-10-CM

## 2020-06-15 DIAGNOSIS — Z13.220 SCREENING FOR LIPID DISORDERS: ICD-10-CM

## 2020-06-15 DIAGNOSIS — Z13.0 SCREENING FOR DEFICIENCY ANEMIA: ICD-10-CM

## 2020-06-15 DIAGNOSIS — Z13.1 SCREENING FOR DIABETES MELLITUS (DM): ICD-10-CM

## 2020-06-15 DIAGNOSIS — I10 BENIGN ESSENTIAL HYPERTENSION: ICD-10-CM

## 2020-06-15 PROCEDURE — 3078F DIAST BP <80 MM HG: CPT | Performed by: FAMILY MEDICINE

## 2020-06-15 PROCEDURE — 1036F TOBACCO NON-USER: CPT | Performed by: FAMILY MEDICINE

## 2020-06-15 PROCEDURE — 3074F SYST BP LT 130 MM HG: CPT | Performed by: FAMILY MEDICINE

## 2020-06-15 PROCEDURE — 99213 OFFICE O/P EST LOW 20 MIN: CPT | Performed by: FAMILY MEDICINE

## 2020-06-15 PROCEDURE — 3008F BODY MASS INDEX DOCD: CPT | Performed by: FAMILY MEDICINE

## 2020-06-16 PROBLEM — Z13.1 SCREENING FOR DIABETES MELLITUS (DM): Status: ACTIVE | Noted: 2020-06-16

## 2020-06-16 PROBLEM — Z13.0 SCREENING FOR DEFICIENCY ANEMIA: Status: ACTIVE | Noted: 2020-06-16

## 2020-06-16 PROBLEM — Z13.220 SCREENING FOR LIPID DISORDERS: Status: ACTIVE | Noted: 2020-06-16

## 2020-06-16 PROBLEM — J04.0 LARYNGITIS: Status: RESOLVED | Noted: 2019-11-08 | Resolved: 2020-06-16

## 2020-06-16 RX ORDER — LOSARTAN POTASSIUM 50 MG/1
50 TABLET ORAL DAILY
Qty: 90 TABLET | Refills: 1 | Status: SHIPPED | OUTPATIENT
Start: 2020-06-16 | End: 2021-05-07

## 2020-07-28 ENCOUNTER — OFFICE VISIT (OUTPATIENT)
Dept: FAMILY MEDICINE CLINIC | Facility: CLINIC | Age: 60
End: 2020-07-28
Payer: COMMERCIAL

## 2020-07-28 VITALS
OXYGEN SATURATION: 96 % | SYSTOLIC BLOOD PRESSURE: 110 MMHG | HEIGHT: 67 IN | DIASTOLIC BLOOD PRESSURE: 82 MMHG | BODY MASS INDEX: 30.45 KG/M2 | TEMPERATURE: 96.9 F | WEIGHT: 194 LBS | HEART RATE: 83 BPM

## 2020-07-28 DIAGNOSIS — R39.15 URGENCY OF URINATION: ICD-10-CM

## 2020-07-28 DIAGNOSIS — N30.01 ACUTE CYSTITIS WITH HEMATURIA: ICD-10-CM

## 2020-07-28 DIAGNOSIS — I10 ESSENTIAL HYPERTENSION: ICD-10-CM

## 2020-07-28 DIAGNOSIS — R30.0 BURNING WITH URINATION: Primary | ICD-10-CM

## 2020-07-28 LAB
SL AMB  POCT GLUCOSE, UA: NEGATIVE
SL AMB LEUKOCYTE ESTERASE,UA: NEGATIVE
SL AMB POCT BILIRUBIN,UA: NEGATIVE
SL AMB POCT BLOOD,UA: ABNORMAL
SL AMB POCT CLARITY,UA: ABNORMAL
SL AMB POCT COLOR,UA: YELLOW
SL AMB POCT KETONES,UA: NEGATIVE
SL AMB POCT NITRITE,UA: NEGATIVE
SL AMB POCT PH,UA: 6
SL AMB POCT SPECIFIC GRAVITY,UA: 1.03
SL AMB POCT URINE PROTEIN: NEGATIVE
SL AMB POCT UROBILINOGEN: 0.2

## 2020-07-28 PROCEDURE — 1036F TOBACCO NON-USER: CPT | Performed by: FAMILY MEDICINE

## 2020-07-28 PROCEDURE — 3008F BODY MASS INDEX DOCD: CPT | Performed by: FAMILY MEDICINE

## 2020-07-28 PROCEDURE — 3074F SYST BP LT 130 MM HG: CPT | Performed by: FAMILY MEDICINE

## 2020-07-28 PROCEDURE — 99213 OFFICE O/P EST LOW 20 MIN: CPT | Performed by: FAMILY MEDICINE

## 2020-07-28 PROCEDURE — 81002 URINALYSIS NONAUTO W/O SCOPE: CPT | Performed by: FAMILY MEDICINE

## 2020-07-28 PROCEDURE — 3079F DIAST BP 80-89 MM HG: CPT | Performed by: FAMILY MEDICINE

## 2020-07-28 RX ORDER — CIPROFLOXACIN 500 MG/1
500 TABLET, FILM COATED ORAL EVERY 12 HOURS SCHEDULED
Qty: 14 TABLET | Refills: 0 | Status: SHIPPED | OUTPATIENT
Start: 2020-07-28 | End: 2020-08-04

## 2020-07-28 NOTE — PROGRESS NOTES
Assessment/Plan:      Diagnoses and all orders for this visit:    Burning with urination  Comments:  now resolved, will call if symptoms recurrent  Orders:  -     POCT urine dip  -     Urine culture    Urgency of urination  Comments:  resolved, increase fluids  Orders:  -     Urine culture    Acute cystitis with hematuria  -     ciprofloxacin (CIPRO) 500 mg tablet; Take 1 tablet (500 mg total) by mouth every 12 (twelve) hours for 7 days    Essential hypertension  Comments:  controlled, continue current medication     BMI 30 0-30 9,adult  Comments:  see bmi plan           Subjective:  Chief Complaint   Patient presents with    Urinary Tract Infection     PT STARTED YESTERDAY AFTERNOON WITH URINARY BURNING , TRACE BLOOD AND URGENCY  NO SYMPTOMS TODAY  Patient ID: Brynn Obregon is a 62 y o  female  Pt here in office today  Started yesterday with symptoms  Last night ok- slept ok without getting up   Slight burning today, nothing like yesterday  Has increased fluids and had cranberry juice  No diarrhea  No swimming, no fevers no back pain  Review of Systems   Constitutional: Negative  Negative for fatigue and fever  HENT: Negative  Eyes: Negative  Respiratory: Negative  Negative for cough  Cardiovascular: Negative  Gastrointestinal: Negative  Endocrine: Negative  Genitourinary: Positive for dysuria, frequency, pelvic pain and urgency  Negative for hematuria  Musculoskeletal: Negative  Skin: Negative  Allergic/Immunologic: Negative  Neurological: Negative  Psychiatric/Behavioral: Negative            The following portions of the patient's history were reviewed and updated as appropriate: allergies, current medications, past family history, past medical history, past social history, past surgical history and problem list     Objective:  Vitals:    07/28/20 1520   BP: 110/82   Pulse: 83   Temp: (!) 96 9 °F (36 1 °C)   SpO2: 96%   Weight: 88 kg (194 lb)   Height: 5' 7" (1 702 m)      Physical Exam   Constitutional: She is oriented to person, place, and time  She appears well-developed and well-nourished  HENT:   Head: Normocephalic and atraumatic  Cardiovascular: Normal rate, regular rhythm, normal heart sounds and intact distal pulses  Pulmonary/Chest: Effort normal and breath sounds normal    Abdominal: Soft  Bowel sounds are normal  There is no tenderness  Neurological: She is alert and oriented to person, place, and time  Skin: Skin is warm and dry  Psychiatric: She has a normal mood and affect  Her behavior is normal  Judgment and thought content normal    Nursing note and vitals reviewed  BMI Counseling: Body mass index is 30 38 kg/m²  The BMI is above normal  Nutrition recommendations include reducing portion sizes  Exercise recommendations include exercising 3-5 times per week

## 2020-07-28 NOTE — PATIENT INSTRUCTIONS
cipro 1 tab twice a day if needed  Urine sent for culture     Urinary Tract Infection in Women   AMBULATORY CARE:   A urinary tract infection (UTI)  is caused by bacteria that get inside your urinary tract  Most bacteria that enter your urinary tract come out when you urinate  If the bacteria stay in your urinary tract, you may get an infection  Your urinary tract includes your kidneys, ureters, bladder, and urethra  Urine is made in your kidneys, and it flows from the ureters to the bladder  Urine leaves the bladder through the urethra  A UTI is more common in your lower urinary tract, which includes your bladder and urethra  Common symptoms include the following:   · Urinating more often or waking from sleep to urinate    · Pain or burning when you urinate    · Pain or pressure in your lower abdomen     · Urine that smells bad    · Blood in your urine    · Leaking urine  Seek care immediately if:   · You are urinating very little or not at all  · You have a high fever with shaking chills  · You have side or back pain that gets worse  Contact your healthcare provider if:   · You have a fever  · You do not feel better after 2 days of taking antibiotics  · You are vomiting  · You have questions or concerns about your condition or care  Treatment for a UTI  may include medicines to treat a bacterial infection  You may also need medicines to decrease pain and burning, or decrease the urge to urinate often  Prevent a UTI:   · Empty your bladder often  Urinate and empty your bladder as soon as you feel the need  Do not hold your urine for long periods of time  · Wipe from front to back after you urinate or have a bowel movement  This will help prevent germs from getting into your urinary tract through your urethra  · Drink liquids as directed  Ask how much liquid to drink each day and which liquids are best for you   You may need to drink more liquids than usual to help flush out the bacteria  Do not drink alcohol, caffeine, or citrus juices  These can irritate your bladder and increase your symptoms  Your healthcare provider may recommend cranberry juice to help prevent a UTI  · Urinate after you have sex  This can help flush out bacteria passed during sex  · Do not douche or use feminine deodorants  These can change the chemical balance in your vagina  · Change sanitary pads or tampons often  This will help prevent germs from getting into your urinary tract  · Do pelvic muscle exercises often  Pelvic muscle exercises may help you start and stop urinating  Strong pelvic muscles may help you empty your bladder easier  Squeeze these muscles tightly for 5 seconds like you are trying to hold back urine  Then relax for 5 seconds  Gradually work up to squeezing for 10 seconds  Do 3 sets of 15 repetitions a day, or as directed  Follow up with your healthcare provider as directed:  Write down your questions so you remember to ask them during your visits  © 2017 [employer] Information is for End User's use only and may not be sold, redistributed or otherwise used for commercial purposes  All illustrations and images included in CareNotes® are the copyrighted property of [employer]  or [employer]  The above information is an  only  It is not intended as medical advice for individual conditions or treatments  Talk to your doctor, nurse or pharmacist before following any medical regimen to see if it is safe and effective for you

## 2020-07-29 PROBLEM — R39.15 URGENCY OF URINATION: Status: ACTIVE | Noted: 2020-07-29

## 2020-07-29 PROBLEM — N30.01 ACUTE CYSTITIS WITH HEMATURIA: Status: ACTIVE | Noted: 2020-07-29

## 2020-07-29 PROBLEM — R30.0 BURNING WITH URINATION: Status: ACTIVE | Noted: 2020-07-29

## 2020-07-29 PROBLEM — Z13.0 SCREENING FOR DEFICIENCY ANEMIA: Status: RESOLVED | Noted: 2020-06-16 | Resolved: 2020-07-29

## 2020-07-29 PROBLEM — Z13.220 SCREENING FOR LIPID DISORDERS: Status: RESOLVED | Noted: 2020-06-16 | Resolved: 2020-07-29

## 2020-07-29 PROBLEM — Z13.1 SCREENING FOR DIABETES MELLITUS (DM): Status: RESOLVED | Noted: 2020-06-16 | Resolved: 2020-07-29

## 2020-07-31 LAB
BACTERIA UR CULT: ABNORMAL
Lab: ABNORMAL
SL AMB ANTIMICROBIAL SUSCEPTIBILITY: ABNORMAL

## 2020-08-08 DIAGNOSIS — I10 ESSENTIAL HYPERTENSION: ICD-10-CM

## 2020-08-12 DIAGNOSIS — I10 ESSENTIAL HYPERTENSION: ICD-10-CM

## 2020-08-12 RX ORDER — HYDROCHLOROTHIAZIDE 12.5 MG/1
12.5 TABLET ORAL DAILY
Qty: 90 TABLET | Refills: 3 | Status: SHIPPED | OUTPATIENT
Start: 2020-08-12 | End: 2021-08-13

## 2020-08-20 RX ORDER — HYDROCHLOROTHIAZIDE 12.5 MG/1
TABLET ORAL
Qty: 30 TABLET | Refills: 2 | OUTPATIENT
Start: 2020-08-20

## 2020-11-07 LAB
ALBUMIN SERPL-MCNC: 4.1 G/DL (ref 3.8–4.9)
ALBUMIN/GLOB SERPL: 1.6 {RATIO} (ref 1.2–2.2)
ALP SERPL-CCNC: 77 IU/L (ref 39–117)
ALT SERPL-CCNC: 12 IU/L (ref 0–32)
AST SERPL-CCNC: 14 IU/L (ref 0–40)
BASOPHILS # BLD AUTO: 0 X10E3/UL (ref 0–0.2)
BASOPHILS NFR BLD AUTO: 1 %
BILIRUB SERPL-MCNC: 0.4 MG/DL (ref 0–1.2)
BUN SERPL-MCNC: 20 MG/DL (ref 6–24)
BUN/CREAT SERPL: 25 (ref 9–23)
CALCIUM SERPL-MCNC: 9.2 MG/DL (ref 8.7–10.2)
CHLORIDE SERPL-SCNC: 101 MMOL/L (ref 96–106)
CHOLEST SERPL-MCNC: 204 MG/DL (ref 100–199)
CO2 SERPL-SCNC: 28 MMOL/L (ref 20–29)
CREAT SERPL-MCNC: 0.81 MG/DL (ref 0.57–1)
EOSINOPHIL # BLD AUTO: 0.1 X10E3/UL (ref 0–0.4)
EOSINOPHIL NFR BLD AUTO: 1 %
ERYTHROCYTE [DISTWIDTH] IN BLOOD BY AUTOMATED COUNT: 12.3 % (ref 11.7–15.4)
GLOBULIN SER-MCNC: 2.6 G/DL (ref 1.5–4.5)
GLUCOSE SERPL-MCNC: 98 MG/DL (ref 65–99)
HCT VFR BLD AUTO: 38.6 % (ref 34–46.6)
HDLC SERPL-MCNC: 76 MG/DL
HGB BLD-MCNC: 12.9 G/DL (ref 11.1–15.9)
IMM GRANULOCYTES # BLD: 0 X10E3/UL (ref 0–0.1)
IMM GRANULOCYTES NFR BLD: 0 %
LDLC SERPL CALC-MCNC: 120 MG/DL (ref 0–99)
LDLC/HDLC SERPL: 1.6 RATIO (ref 0–3.2)
LYMPHOCYTES # BLD AUTO: 1.9 X10E3/UL (ref 0.7–3.1)
LYMPHOCYTES NFR BLD AUTO: 30 %
MCH RBC QN AUTO: 30 PG (ref 26.6–33)
MCHC RBC AUTO-ENTMCNC: 33.4 G/DL (ref 31.5–35.7)
MCV RBC AUTO: 90 FL (ref 79–97)
MONOCYTES # BLD AUTO: 0.3 X10E3/UL (ref 0.1–0.9)
MONOCYTES NFR BLD AUTO: 5 %
NEUTROPHILS # BLD AUTO: 3.9 X10E3/UL (ref 1.4–7)
NEUTROPHILS NFR BLD AUTO: 63 %
PLATELET # BLD AUTO: 249 X10E3/UL (ref 150–450)
POTASSIUM SERPL-SCNC: 4.2 MMOL/L (ref 3.5–5.2)
PROT SERPL-MCNC: 6.7 G/DL (ref 6–8.5)
RBC # BLD AUTO: 4.3 X10E6/UL (ref 3.77–5.28)
SL AMB EGFR AFRICAN AMERICAN: 92 ML/MIN/1.73
SL AMB EGFR NON AFRICAN AMERICAN: 80 ML/MIN/1.73
SL AMB VLDL CHOLESTEROL CALC: 8 MG/DL (ref 5–40)
SODIUM SERPL-SCNC: 141 MMOL/L (ref 134–144)
TRIGL SERPL-MCNC: 43 MG/DL (ref 0–149)
TSH SERPL DL<=0.005 MIU/L-ACNC: 1.1 UIU/ML (ref 0.45–4.5)
WBC # BLD AUTO: 6.2 X10E3/UL (ref 3.4–10.8)

## 2020-11-10 ENCOUNTER — TELEPHONE (OUTPATIENT)
Dept: FAMILY MEDICINE CLINIC | Facility: CLINIC | Age: 60
End: 2020-11-10

## 2020-11-11 DIAGNOSIS — R39.9 SYMPTOMS OF URINARY TRACT INFECTION: Primary | ICD-10-CM

## 2020-11-11 RX ORDER — CIPROFLOXACIN 500 MG/1
500 TABLET, FILM COATED ORAL EVERY 12 HOURS SCHEDULED
Qty: 14 TABLET | Refills: 0 | Status: SHIPPED | OUTPATIENT
Start: 2020-11-11 | End: 2020-11-18

## 2021-05-05 DIAGNOSIS — I10 ESSENTIAL HYPERTENSION: ICD-10-CM

## 2021-05-07 RX ORDER — LOSARTAN POTASSIUM 50 MG/1
TABLET ORAL
Qty: 30 TABLET | Refills: 5 | Status: SHIPPED | OUTPATIENT
Start: 2021-05-07 | End: 2021-11-16

## 2021-05-07 NOTE — TELEPHONE ENCOUNTER
Other reason request has been forwarded to provider: left detailed vm for pt to cb and schedule appt

## 2021-05-17 ENCOUNTER — OFFICE VISIT (OUTPATIENT)
Dept: FAMILY MEDICINE CLINIC | Facility: CLINIC | Age: 61
End: 2021-05-17
Payer: COMMERCIAL

## 2021-05-17 VITALS
OXYGEN SATURATION: 95 % | TEMPERATURE: 98.3 F | DIASTOLIC BLOOD PRESSURE: 78 MMHG | WEIGHT: 198.75 LBS | BODY MASS INDEX: 31.19 KG/M2 | HEART RATE: 80 BPM | SYSTOLIC BLOOD PRESSURE: 125 MMHG | HEIGHT: 67 IN

## 2021-05-17 DIAGNOSIS — Z13.220 SCREENING FOR LIPID DISORDERS: ICD-10-CM

## 2021-05-17 DIAGNOSIS — Z00.00 WELL ADULT EXAM: Primary | ICD-10-CM

## 2021-05-17 DIAGNOSIS — Z13.1 SCREENING FOR DIABETES MELLITUS (DM): ICD-10-CM

## 2021-05-17 DIAGNOSIS — Z12.11 SCREENING FOR COLON CANCER: ICD-10-CM

## 2021-05-17 DIAGNOSIS — I10 ESSENTIAL HYPERTENSION: ICD-10-CM

## 2021-05-17 DIAGNOSIS — E66.09 CLASS 1 OBESITY DUE TO EXCESS CALORIES WITH SERIOUS COMORBIDITY AND BODY MASS INDEX (BMI) OF 31.0 TO 31.9 IN ADULT: ICD-10-CM

## 2021-05-17 DIAGNOSIS — Z13.0 SCREENING FOR DEFICIENCY ANEMIA: ICD-10-CM

## 2021-05-17 DIAGNOSIS — Z13.29 SCREENING FOR THYROID DISORDER: ICD-10-CM

## 2021-05-17 PROBLEM — R30.0 BURNING WITH URINATION: Status: RESOLVED | Noted: 2020-07-29 | Resolved: 2021-05-17

## 2021-05-17 PROBLEM — N30.01 ACUTE CYSTITIS WITH HEMATURIA: Status: RESOLVED | Noted: 2020-07-29 | Resolved: 2021-05-17

## 2021-05-17 PROCEDURE — 3725F SCREEN DEPRESSION PERFORMED: CPT | Performed by: FAMILY MEDICINE

## 2021-05-17 PROCEDURE — 99396 PREV VISIT EST AGE 40-64: CPT | Performed by: FAMILY MEDICINE

## 2021-05-17 PROCEDURE — 99213 OFFICE O/P EST LOW 20 MIN: CPT | Performed by: FAMILY MEDICINE

## 2021-05-17 PROCEDURE — 1036F TOBACCO NON-USER: CPT | Performed by: FAMILY MEDICINE

## 2021-05-17 NOTE — PROGRESS NOTES
BMI Counseling: There is no height or weight on file to calculate BMI  The BMI is above normal  Exercise recommendations include exercising 3-5 times per week  Subjective:   Chief Complaint   Patient presents with    Medication Management     Pt here today to review her BP meds  Went to [employer] for her first [place] vaccination and checked her BP  It was in the 90s/40s  Patient ID: Wilver Otto is a 62 y o  female  Pt here to discuss blood pressure  Had a low reading at pharmacy and was concerned  Pt has been taking blood pressure medication  Has not lost any weight  Was asymptomatic at the time  Has not been checking bp with home cuff although she does have one  She had been watching carbs and lost weight but now increased carbs in diet and gained back weight  Trouble is lunch- eats on the run  Has a slimfast shake for breakfast and sometimes dinner left overs for lunch  The following portions of the patient's history were reviewed and updated as appropriate: allergies, current medications, past family history, past medical history, past social history, past surgical history and problem list     Review of Systems   Constitutional: Negative  Negative for fatigue and fever  HENT: Negative  Eyes: Negative  Respiratory: Negative  Negative for cough  Cardiovascular: Negative  Gastrointestinal: Negative  Endocrine: Negative  Genitourinary: Negative  Musculoskeletal: Negative  Skin: Negative  Allergic/Immunologic: Negative  Neurological: Negative  Psychiatric/Behavioral: Negative  Objective:  Vitals:    05/17/21 1033   BP: 104/78   BP Location: Left arm   Patient Position: Sitting   Cuff Size: Standard   Pulse: 80   Temp: 98 3 °F (36 8 °C)   TempSrc: Tympanic   SpO2: 95%   Weight: 90 2 kg (198 lb 12 oz)   Height: 5' 7" (1 702 m)      Physical Exam  Vitals signs and nursing note reviewed  Constitutional:       Appearance: She is well-developed  HENT:      Head: Normocephalic and atraumatic  Cardiovascular:      Rate and Rhythm: Normal rate and regular rhythm  Heart sounds: Normal heart sounds  Pulmonary:      Effort: Pulmonary effort is normal       Breath sounds: Normal breath sounds  Abdominal:      General: Bowel sounds are normal       Palpations: Abdomen is soft  Skin:     General: Skin is warm and dry  Neurological:      Mental Status: She is alert and oriented to person, place, and time  Psychiatric:         Behavior: Behavior normal          Thought Content: Thought content normal          Judgment: Judgment normal          Diabetic Foot Exam      Assessment/Plan:    No problem-specific Assessment & Plan notes found for this encounter  Diagnoses and all orders for this visit:    Screening for colon cancer  -     Cologuard; Future    Screening for deficiency anemia  -     CBC and differential; Future    Screening for diabetes mellitus (DM)  -     Comprehensive metabolic panel; Future    Screening for lipid disorders  -     Lipid panel; Future    Screening for thyroid disorder  -     TSH, 3rd generation with Free T4 reflex;  Future

## 2021-05-17 NOTE — PROGRESS NOTES
Subjective     Lara Garcia is a 62 y o   female and is here for routine health maintenance  The patient reports doing well   History of Present Illness     Pt seen for physical today and follow up on blood pressure  Had covid immunization at pharmacy and checked her bp there with cuff and it was low 90s over 50s  Pt not feeling bad, no lightheadedness or dizziness  No recent illnesses  Having trouble losing weight  Taking blood pressure medication  Denies chest pain or shortness of breath  Had lost weight by decreasing carbs, now gained weight  Pt states she submitted stool sample for cologuard and took too long to get to lab by mail? bms normal but has not had colonoscopy  - slimfast in the am, cutting back on the carbs,   [facility] - next     Well Adult Physical   Patient here for a comprehensive physical exam       Diet and Physical Activity  Diet: well balanced diet  Weight concerns: Patient has class 1 obesity (BMI 30-34  9)  Exercise: frequently      Depression Screen  PHQ-9 Depression Screening    PHQ-9:   Frequency of the following problems over the past two weeks:      Little interest or pleasure in doing things: 0 - not at all  Feeling down, depressed, or hopeless: 0 - not at all  PHQ-2 Score: 0          General Health  Hearing: Normal:  bilateral  Vision: no vision problems  Dental: regular dental visits     History:  LMP: No LMP recorded   Patient is postmenopausal       Cancer Screening  Colononoscopy - send cologuard   Mammogram 12/20   Pap up to date  Abnormal pap? no  Smoker NO Annual screening with low-dose helical computed tomography (CT) for patients age 54 to 76 years with history of smoking at least 30 pack-years and, if a former smoker, had quit within the previous 15 years      The following portions of the patient's history were reviewed and updated as appropriate: allergies, current medications, past family history, past medical history, past social history, past surgical history and problem list     Review of Systems     Review of Systems   Constitutional: Negative  Negative for fatigue and fever  HENT: Negative  Eyes: Negative  Respiratory: Negative  Negative for cough  Cardiovascular: Negative  Gastrointestinal: Negative  Endocrine: Negative  Genitourinary: Negative  Musculoskeletal: Negative  Skin: Negative  Allergic/Immunologic: Negative  Neurological: Negative  Psychiatric/Behavioral: Negative          Past Medical History     Past Medical History:   Diagnosis Date    Fracture of distal end of radius     Herniated nucleus pulposus, L5-S1     Hyperlipidemia     Malignant melanoma of skin (HCC)     Resolved: 2017    Migraine     Plantar fasciitis     Last Assessed: 2016       Past Surgical History     Past Surgical History:   Procedure Laterality Date     SECTION      HYSTEROSCOPY      SKIN CANCER EXCISION Left     leg, melanoma in situ    TONSILLECTOMY         Social History     Social History     Socioeconomic History    Marital status: /Civil Union     Spouse name: None    Number of children: None    Years of education: None    Highest education level: None   Occupational History    Occupation:    Social Needs    Financial resource strain: None    Food insecurity     Worry: None     Inability: None    Transportation needs     Medical: None     Non-medical: None   Tobacco Use    Smoking status: Never Smoker    Smokeless tobacco: Never Used   Substance and Sexual Activity    Alcohol use: No     Comment: social drinker per [employer]    Drug use: No    Sexual activity: None   Lifestyle    Physical activity     Days per week: None     Minutes per session: None    Stress: None   Relationships    Social connections     Talks on phone: None     Gets together: None     Attends Anabaptism service: None     Active member of club or organization: None     Attends meetings of clubs or organizations: None     Relationship status: None    Intimate partner violence     Fear of current or ex partner: None     Emotionally abused: None     Physically abused: None     Forced sexual activity: None   Other Topics Concern    None   Social History Narrative    None       Family History     Family History   Problem Relation Age of Onset    Hypertension Mother     Pulmonary embolism Mother     Heart attack Father         age 43    Dementia Father     Hypertension Sister     No Known Problems Brother     Cancer Family     Lymphoma Family         Precursor B-cell Lymphoblastic of the bone    Hypertension Brother     Atrial fibrillation Brother     No Known Problems Daughter        Current Medications       Current Outpatient Medications:     hydrochlorothiazide (HYDRODIURIL) 12 5 mg tablet, Take 1 tablet (12 5 mg total) by mouth daily, Disp: 90 tablet, Rfl: 3    losartan (COZAAR) 50 mg tablet, TAKE 1 TABLET BY MOUTH EVERY DAY, Disp: 30 tablet, Rfl: 5     Allergies     No Known Allergies    Objective     /78   Pulse 80   Temp 98 3 °F (36 8 °C) (Tympanic)   Ht 5' 7" (1 702 m)   Wt 90 2 kg (198 lb 12 oz)   SpO2 95%   BMI 31 13 kg/m²      Physical Exam  Vitals signs and nursing note reviewed  Constitutional:       Appearance: She is well-developed  HENT:      Head: Normocephalic  Right Ear: External ear normal       Left Ear: External ear normal       Nose: Nose normal    Eyes:      Conjunctiva/sclera: Conjunctivae normal       Pupils: Pupils are equal, round, and reactive to light  Neck:      Musculoskeletal: Normal range of motion and neck supple  Cardiovascular:      Rate and Rhythm: Normal rate and regular rhythm  Heart sounds: Normal heart sounds  Pulmonary:      Effort: Pulmonary effort is normal       Breath sounds: Normal breath sounds  Abdominal:      General: Bowel sounds are normal       Palpations: Abdomen is soft     Skin:     General: Skin is warm and dry    Neurological:      Mental Status: She is alert and oriented to person, place, and time  Psychiatric:         Behavior: Behavior normal          Thought Content: Thought content normal          Judgment: Judgment normal            No exam data present    Health Maintenance     Health Maintenance   Topic Date Due    COVID-19 Vaccine (1) Never done    HIV Screening  Never done    Colorectal Cancer Screening  Never done    Annual Physical  12/17/2020    Influenza Vaccine (Season Ended) 09/01/2021    MAMMOGRAM  12/04/2021    Depression Screening PHQ  05/17/2022    BMI: Followup Plan  05/17/2022    BMI: Adult  05/17/2022    Cervical Cancer Screening  07/17/2024    DTaP,Tdap,and Td Vaccines (2 - Td) 11/05/2029    Hepatitis C Screening  Completed    Pneumococcal Vaccine: Pediatrics (0 to 5 Years) and At-Risk Patients (6 to 59 Years)  Aged Out    HIB Vaccine  Aged Out    Hepatitis B Vaccine  Aged Out    IPV Vaccine  Aged Out    Hepatitis A Vaccine  Aged Out    Meningococcal ACWY Vaccine  Aged Out    HPV Vaccine  Aged [employer] History   Administered Date(s) Administered    Tdap 11/05/2019       Assessment/Plan         1  Healthy female exam   2  Patient Counseling:   · Nutrition: Stressed importance of a well balanced diet, moderation of sodium/saturated fat, caloric balance and sufficient intake of fiber  · Exercise: Stressed the importance of regular exercise with a goal of 150 minutes per week  · Dental Health: Discussed daily flossing and brushing and regular dental visits     · Immunizations reviewed  · Discussed benefits of screening   · Discussed the patient's BMI with her  The BMI is above average; BMI management plan is completed  3  Cancer Screening   4  Labs   5  Cologuard will come in mail   6  Follow up in one year      Rachel Washington DO

## 2021-05-18 PROBLEM — E66.811 CLASS 1 OBESITY DUE TO EXCESS CALORIES WITH SERIOUS COMORBIDITY AND BODY MASS INDEX (BMI) OF 31.0 TO 31.9 IN ADULT: Status: ACTIVE | Noted: 2018-09-05

## 2021-05-18 PROBLEM — E66.09 CLASS 1 OBESITY DUE TO EXCESS CALORIES WITH SERIOUS COMORBIDITY AND BODY MASS INDEX (BMI) OF 31.0 TO 31.9 IN ADULT: Status: ACTIVE | Noted: 2018-09-05

## 2021-05-18 PROBLEM — Z13.29 SCREENING FOR THYROID DISORDER: Status: ACTIVE | Noted: 2021-05-18

## 2021-05-18 NOTE — ASSESSMENT & PLAN NOTE
Controlled, will not adjust medication currently  Monitor with home bp cuff   If weight loss, may be able to reduce medication

## 2021-05-25 ENCOUNTER — VBI (OUTPATIENT)
Dept: ADMINISTRATIVE | Facility: OTHER | Age: 61
End: 2021-05-25

## 2021-06-16 ENCOUNTER — OFFICE VISIT (OUTPATIENT)
Dept: FAMILY MEDICINE CLINIC | Facility: CLINIC | Age: 61
End: 2021-06-16
Payer: COMMERCIAL

## 2021-06-16 VITALS
DIASTOLIC BLOOD PRESSURE: 80 MMHG | BODY MASS INDEX: 31.23 KG/M2 | SYSTOLIC BLOOD PRESSURE: 114 MMHG | WEIGHT: 199 LBS | HEIGHT: 67 IN | OXYGEN SATURATION: 96 % | HEART RATE: 86 BPM

## 2021-06-16 DIAGNOSIS — G89.29 CHRONIC NECK PAIN: Primary | ICD-10-CM

## 2021-06-16 DIAGNOSIS — M54.2 CHRONIC NECK PAIN: Primary | ICD-10-CM

## 2021-06-16 DIAGNOSIS — M25.50 ARTHRALGIA OF MULTIPLE JOINTS: ICD-10-CM

## 2021-06-16 DIAGNOSIS — M54.12 CERVICAL RADICULOPATHY: ICD-10-CM

## 2021-06-16 DIAGNOSIS — I10 ESSENTIAL HYPERTENSION: ICD-10-CM

## 2021-06-16 PROBLEM — Z00.00 WELL ADULT EXAM: Status: RESOLVED | Noted: 2018-09-05 | Resolved: 2021-06-16

## 2021-06-16 PROCEDURE — 99213 OFFICE O/P EST LOW 20 MIN: CPT | Performed by: FAMILY MEDICINE

## 2021-06-16 PROCEDURE — 3008F BODY MASS INDEX DOCD: CPT | Performed by: FAMILY MEDICINE

## 2021-06-16 NOTE — PATIENT INSTRUCTIONS
Xray cervical spine   Aleve 1 tab daily with food, increase to 1 tab twice a day  If no help the prednisone taper

## 2021-06-16 NOTE — PROGRESS NOTES
Assessment/Plan:      1  Chronic neck pain  -     XR spine cervical complete 4 or 5 vw non injury; Future; Expected date: 06/16/2021    2  Cervical radiculopathy  Assessment & Plan:  Xray cervical spine      3  Arthralgia of multiple joints  -     Lyme Antibody Profile with reflex to WB; Future  -     Lyme Antibody Profile with reflex to WB    4  Essential hypertension  Assessment & Plan:  Controlled, continue current medication          Subjective:  Chief Complaint   Patient presents with    Other     pt states is having pins and needles for about 2 weeks, pt states R shoulder and goes down to her back  pt is having R side neck pain  pt is vaccineated for covid updated on her chart and made copy        Patient ID: Wilver Otto is a 62 y o  female  Complains of intermittent pins and needles on right side down neck and into right shoulder  Symptoms over the past 2 weeks  Denies injury  Pt has been seeing chiropractor for chronic right neck pain  Has not taken medication      Review of Systems   Constitutional: Negative  Negative for fatigue and fever  HENT: Negative  Eyes: Negative  Respiratory: Negative  Negative for cough  Cardiovascular: Negative  Gastrointestinal: Negative  Endocrine: Negative  Genitourinary: Negative  Musculoskeletal: Positive for neck pain and neck stiffness  Skin: Negative  Allergic/Immunologic: Negative  Neurological:        Tingling across right shoulder   Psychiatric/Behavioral: Negative  The following portions of the patient's history were reviewed and updated as appropriate: allergies, current medications, past family history, past medical history, past social history, past surgical history and problem list     Objective:  Vitals:    06/16/21 1248   BP: 114/80   Pulse: 86   SpO2: 96%   Weight: 90 3 kg (199 lb)   Height: 5' 7" (1 702 m)      Physical Exam  Vitals and nursing note reviewed     Constitutional:       Appearance: She is well-developed  HENT:      Head: Normocephalic and atraumatic  Cardiovascular:      Rate and Rhythm: Normal rate and regular rhythm  Heart sounds: Normal heart sounds  Pulmonary:      Effort: Pulmonary effort is normal       Breath sounds: Normal breath sounds  Abdominal:      General: Bowel sounds are normal       Palpations: Abdomen is soft  Musculoskeletal:         General: No tenderness  Comments: No tenderness over cervical spine centrally  No change in range of motion  Skin:     General: Skin is warm and dry  Neurological:      Mental Status: She is alert and oriented to person, place, and time  Psychiatric:         Behavior: Behavior normal          Thought Content:  Thought content normal          Judgment: Judgment normal

## 2021-06-20 PROBLEM — G89.29 CHRONIC NECK PAIN: Status: ACTIVE | Noted: 2021-06-20

## 2021-06-20 PROBLEM — M25.50 ARTHRALGIA OF MULTIPLE JOINTS: Status: ACTIVE | Noted: 2021-06-20

## 2021-06-20 PROBLEM — M54.2 CHRONIC NECK PAIN: Status: ACTIVE | Noted: 2021-06-20

## 2021-07-12 ENCOUNTER — HOSPITAL ENCOUNTER (OUTPATIENT)
Dept: RADIOLOGY | Facility: HOSPITAL | Age: 61
Discharge: HOME | End: 2021-07-12
Payer: COMMERCIAL

## 2021-07-12 DIAGNOSIS — M54.2 CHRONIC NECK PAIN: ICD-10-CM

## 2021-07-12 DIAGNOSIS — G89.29 CHRONIC NECK PAIN: ICD-10-CM

## 2021-07-12 PROCEDURE — 72050 X-RAY EXAM NECK SPINE 4/5VWS: CPT

## 2021-07-19 DIAGNOSIS — M54.2 CHRONIC NECK PAIN: Primary | ICD-10-CM

## 2021-07-19 DIAGNOSIS — M50.30 DEGENERATIVE DISC DISEASE, CERVICAL: ICD-10-CM

## 2021-07-19 DIAGNOSIS — G89.29 CHRONIC NECK PAIN: Primary | ICD-10-CM

## 2021-07-20 ENCOUNTER — TELEPHONE (OUTPATIENT)
Dept: FAMILY MEDICINE CLINIC | Facility: CLINIC | Age: 61
End: 2021-07-20

## 2021-07-20 NOTE — TELEPHONE ENCOUNTER
----- Message from Jadiel Douglas DO sent at 7/19/2021  4:43 PM EDT -----  Yes, I think physical therapy would be beneficial

## 2021-07-21 ENCOUNTER — EVALUATION (OUTPATIENT)
Dept: PHYSICAL THERAPY | Facility: CLINIC | Age: 61
End: 2021-07-21
Payer: COMMERCIAL

## 2021-07-21 DIAGNOSIS — G89.29 CHRONIC NECK PAIN: ICD-10-CM

## 2021-07-21 DIAGNOSIS — M54.2 CHRONIC NECK PAIN: ICD-10-CM

## 2021-07-21 DIAGNOSIS — M50.30 DEGENERATIVE DISC DISEASE, CERVICAL: ICD-10-CM

## 2021-07-21 PROCEDURE — 97110 THERAPEUTIC EXERCISES: CPT | Performed by: PHYSICAL THERAPIST

## 2021-07-21 PROCEDURE — 97162 PT EVAL MOD COMPLEX 30 MIN: CPT | Performed by: PHYSICAL THERAPIST

## 2021-07-21 PROCEDURE — 97140 MANUAL THERAPY 1/> REGIONS: CPT | Performed by: PHYSICAL THERAPIST

## 2021-07-21 NOTE — PROGRESS NOTES
PT Evaluation     Today's date: 2021  Patient name: Doreen Hurd  : 1960  MRN: [id number]  Referring provider: Kev Ayala DO  Dx:   Encounter Diagnosis     ICD-10-CM    1  Chronic neck pain  M54 2 Ambulatory referral to Physical Therapy    G89 29    2  Degenerative disc disease, cervical  M50 30 Ambulatory referral to Physical Therapy                  Assessment  Assessment details: Doreen Hurd is a 62 y o  female presenting to outpatient physical therapy at [place] [address] with complaints of insidious onset of chronic right sided neck pain with RUE radiculopathy   She presents with decreased range of motion, decreased strength, limited flexibility, poor postural awareness, poor body mechanics, poor balance, decreased tolerance to activity and decreased functional mobility due to chronic neck pain, Degenerative disc disease, cervical  Therapist discussed diagnosis, prognosis, plan of care, proper responses to exercises, HEP, and modalities to use at home   Inez Bai would benefit from skilled PT services in order to address these deficits and reach maximum level of function   Thank you for the referral!  Impairments: abnormal coordination, abnormal muscle firing, abnormal muscle tone, abnormal or restricted ROM, abnormal movement, activity intolerance, impaired physical strength, lacks appropriate home exercise program, pain with function, scapular dyskinesis, poor posture  and poor body mechanics    Symptom irritability: moderateUnderstanding of Dx/Px/POC: good   Prognosis: good    Goals  STGs (in 4 weeks):  1  Pt will report having at least a 50% improvement since I E   2  Pt will demonstrate full CROM in all planes without onset of pain  LTGs (in 12 weeks):  1  Pt will report having at least a 75% improvement since I E   2  Pt will demonstrate improved turning and tipping her head in all directions especially to the right without onset of pain  3  Pt will be independent with HEP       Plan  Patient would benefit from: skilled physical therapy  Planned modality interventions: TENS, unattended electrical stimulation, ultrasound and traction  Planned therapy interventions: joint mobilization, manual therapy, neuromuscular re-education, therapeutic activities, therapeutic exercise, stretching, strengthening, self care, patient education, home exercise program and flexibility  Frequency: 2x week  Plan of Care beginning date: 2021  Plan of Care expiration date: 10/13/2021  Treatment plan discussed with: patient        Subjective Evaluation    History of Present Illness  Mechanism of injury: Pt is a 62year old female who presents with insidious onset of chronic neck pain (R worse than L) with RUE radiculopathy  She reports having increased pain with turning to the right  She reports that her sxs have increased over the past 6 months and occasionally has radicular pain in to her right deltoid with some weakness noticed with her   She reports also having hx of jaw pain which increases with eating  Xray reveals DDD and spinal stenosis in cervical spine  Now referred to skilled to OP PT     Quality of life: good    Pain  Current pain rating: 3  At best pain ratin  At worst pain ratin  Quality: needle-like, pressure, discomfort, dull ache, throbbing, radiating and tight  Relieving factors: relaxation and rest  Aggravating factors: lifting (turning, tipping neck, weakness with )  Progression: worsening    Hand dominance: right    Treatments  Previous treatment: chiropractic  Patient Goals  Patient goals for therapy: decreased pain, increased motion, increased strength, independence with ADLs/IADLs and return to sport/leisure activities          Objective     Objective:   AROM: standing  R   L  Shoulder flexion  WNL   WNL  Shoulder abduction  WNL   WNL  Shoulder [email or web address]  WNL   WNL  Shoulder IR   WNL   WNL    STRENGTH:    R   L    Shoulder flexion  4/5   4/5  Shoulder abduction  4-/5   4-/5  Shoulder [email or web address]  4-/5   4-/5  Shoulder IR   4/5   4/5    UT    4-/5   4-/5  MT    3+/5   3+/5  LT    3+/5   3+/5     strength (L2): (R) TBA (L) TBA    Posture: Pt's sitting posture is FHP and rounded shoulders       Cervical Screen: cervical AROM limitations (measured in degrees); (*=pain)  Flexion  35  Extension 45  R rotation 50*  L rotation 82  R sidebend 35*  L sidebend 40  Retraction 25%    Mechanical assessment:  Repeated Flexion: increased tenderness with R upper trap  Repeated Extension: decreased pain  Repeated Retraction: no change    Special Tests: (+) Spruling's test to the R, (+) distraction with slight decreased sxs    Precautions: hx jaw pain    HEP: supine chin tucks, upper trap stretch, SNAGs extension    Specialty Daily Treatment Diary       Manual 7/21       STM; TPR C/S paraspinals, UT, LS (R worse than L) SMF       PA glides SMF       Cupping; IASTM        UT, LS, C/S Flexion stretch                Exercise Diary         UBE retro                 Low and high pec stretch        TB Scap Retraction        TB S' Ext                B ER        B Horiz ABD                SNAGs Ext 2 sec x 10       SNAGs Rotation        UT, LS, SCM stretch        Seated Chin tucks Supine: 5 sec x 10       Chin tucks c extension         Seated Thoracic Extension c MB                Scaption c chin tucks                Prone T        Prone I        Prone W                Sidelying Thoracic Rotation                 Gripper                        HEP/EDU 15 mins       Modalities                CP        Estim           Skin checks performed pre and post application: intact

## 2021-07-27 ENCOUNTER — OFFICE VISIT (OUTPATIENT)
Dept: PHYSICAL THERAPY | Facility: CLINIC | Age: 61
End: 2021-07-27
Payer: COMMERCIAL

## 2021-07-27 DIAGNOSIS — M54.2 CHRONIC NECK PAIN: Primary | ICD-10-CM

## 2021-07-27 DIAGNOSIS — M50.30 DEGENERATIVE DISC DISEASE, CERVICAL: ICD-10-CM

## 2021-07-27 DIAGNOSIS — G89.29 CHRONIC NECK PAIN: Primary | ICD-10-CM

## 2021-07-27 PROCEDURE — 97110 THERAPEUTIC EXERCISES: CPT | Performed by: PHYSICAL THERAPIST

## 2021-07-27 PROCEDURE — 97140 MANUAL THERAPY 1/> REGIONS: CPT | Performed by: PHYSICAL THERAPIST

## 2021-07-27 NOTE — PROGRESS NOTES
Daily Note     Today's date: 2021  Patient name: Mike Jackson  : 1960  MRN: [id number]  Referring provider: Freedom Kent DO  Dx:   Encounter Diagnosis     ICD-10-CM    1  Chronic neck pain  M54 2     G89 29    2  Degenerative disc disease, cervical  M50 30                   Subjective: Pt reports that she bought her back  which she has been using  She does have some headaches afterwards but not too bad  She also reports that her radiculopathy might be improving  Objective: See treatment diary below      Assessment: Tolerated treatment well; initiated POC with MH in supine with chin tucks  VCs provided on proper sequencing with exercises; added supine pec stretching and moving cervical extension  She denies having increased pain  MT performed after receiving consent from pt; she had decreased PA glides in cervical spine which slightly improved post MT and increased tissue tone on right more than left which again improves post TPR  Reviewed HEP and discussed modalities  Patient demonstrated fatigue post treatment and would benefit from continued PT      Plan: Continue per plan of care        EP: supine chin tucks, upper trap stretch, SNAGs extension    Specialty Daily Treatment Diary       Manual       STM; TPR C/S paraspinals, UT, LS (R worse than L) SMF SMF      PA glides SMF SMF      Cupping; IASTM        UT, LS, C/S Flexion stretch                Exercise Diary         UBE retro                 Low and high pec stretch        TB Scap Retraction        TB S' Ext                B ER        B Horiz ABD                SNAGs Ext 2 sec x 10 2 sec x 10 (w/o moving arm); 2 sec x 10 (w/ moving arm)      SNAGs Rotation        UT, LS, SCM stretch  10 sec x 3 (UT only)       Seated Chin tucks Supine: 5 sec x 10 Supine c MH: 5 sec; 3x10      Chin tucks c extension         Seated Thoracic Extension c MB                Scaption c chin tucks                        Supine Pec stretch (Hugs to T's)  5 sec; 2x10              Prone T        Prone I        Prone W                Sidelying Thoracic Rotation                 Gripper                        HEP/EDU 15 mins 5 mins      Modalities        MH  10 mins in supine with chin tucks      CP        Estim           Skin checks performed pre and post application: intact

## 2021-07-30 ENCOUNTER — OFFICE VISIT (OUTPATIENT)
Dept: PHYSICAL THERAPY | Facility: CLINIC | Age: 61
End: 2021-07-30
Payer: COMMERCIAL

## 2021-07-30 DIAGNOSIS — M50.30 DEGENERATIVE DISC DISEASE, CERVICAL: ICD-10-CM

## 2021-07-30 DIAGNOSIS — M54.2 CHRONIC NECK PAIN: Primary | ICD-10-CM

## 2021-07-30 DIAGNOSIS — G89.29 CHRONIC NECK PAIN: Primary | ICD-10-CM

## 2021-07-30 PROCEDURE — 97140 MANUAL THERAPY 1/> REGIONS: CPT | Performed by: PHYSICAL THERAPIST

## 2021-07-30 PROCEDURE — 97110 THERAPEUTIC EXERCISES: CPT | Performed by: PHYSICAL THERAPIST

## 2021-07-30 PROCEDURE — 97112 NEUROMUSCULAR REEDUCATION: CPT | Performed by: PHYSICAL THERAPIST

## 2021-07-30 NOTE — PROGRESS NOTES
Daily Note     Today's date: 2021  Patient name: Dimas Peck  : 1960  MRN: [id number]  Referring provider: Daphne Morgan DO  Dx:   Encounter Diagnosis     ICD-10-CM    1  Chronic neck pain  M54 2     G89 29    2  Degenerative disc disease, cervical  M50 30                   Subjective: Pt reports that she was not as sore after last visit  Objective: See treatment diary below      Assessment: Tolerated treatment well; initiated POC with MH to in supine with chin tucks  VCs provided on proper sequencing with exercises; added SNAGs rotation and unilateral pec stretching  She denies having increased pain throughout session  She reports having tenderness with palpation of her cervical paraspinals  Patient demonstrated fatigue post treatment and would benefit from continued PT      Plan: Continue per plan of care        EP: supine chin tucks, upper trap stretch, SNAGs extension    Specialty Daily Treatment Diary       Manual      STM; TPR C/S paraspinals, UT, LS (R worse than L) SMF SMF SMF     PA glides SMF SMF SMF     Cupping; IASTM        UT, LS, C/S Flexion stretch   SMF             Exercise Diary         UBE retro                 Low and high pec stretch        TB Scap Retraction        TB S' Ext                B ER        B Horiz ABD                SNAGs Ext 2 sec x 10 2 sec x 10 (w/o moving arm); 2 sec x 10 (w/ moving arm) 2 sec x 10 (w/o moving arm); 2 sec x 10 (w/ moving arm)      SNAGs Rotation   10 ea     UT, LS, SCM stretch  10 sec x 3 (UT only)  10 sec x 3 (UT stretch only)     Seated Chin tucks Supine: 5 sec x 10 Supine c MH: 5 sec; 3x10 Supine c MH: 5 sec; 3x10     Chin tucks c extension         Seated Thoracic Extension c MB                Scaption c chin tucks                        Supine Pec stretch (Hugs to T's)  5 sec; 2x10 5 sec; 2x10             Prone T        Prone I        Prone W                Sidelying Thoracic Rotation                 Gripper HEP/EDU 15 mins 5 mins      Modalities        MH  10 mins in supine with chin tucks 10 mins in supine with chin tucks     CP        Estim           Skin checks performed pre and post application: intact

## 2021-08-04 ENCOUNTER — OFFICE VISIT (OUTPATIENT)
Dept: PHYSICAL THERAPY | Facility: CLINIC | Age: 61
End: 2021-08-04
Payer: COMMERCIAL

## 2021-08-04 DIAGNOSIS — G89.29 CHRONIC NECK PAIN: Primary | ICD-10-CM

## 2021-08-04 DIAGNOSIS — M54.2 CHRONIC NECK PAIN: Primary | ICD-10-CM

## 2021-08-04 DIAGNOSIS — M50.30 DEGENERATIVE DISC DISEASE, CERVICAL: ICD-10-CM

## 2021-08-04 PROCEDURE — 97110 THERAPEUTIC EXERCISES: CPT | Performed by: PHYSICAL THERAPIST

## 2021-08-04 PROCEDURE — 97140 MANUAL THERAPY 1/> REGIONS: CPT | Performed by: PHYSICAL THERAPIST

## 2021-08-04 NOTE — PROGRESS NOTES
Daily Note     Today's date: 2021  Patient name: Loc Sellers  : 1960  MRN: [id number]  Referring provider: Ana Meeks DO  Dx:   Encounter Diagnosis     ICD-10-CM    1  Chronic neck pain  M54 2     G89 29    2  Degenerative disc disease, cervical  M50 30                   Subjective: Pt reports that she continues to have discomfort in the morning which is is unsure if it is her pillow  Objective: See treatment diary below      Assessment: Tolerated treatment well; initiated POC with MH in supine chin tucks and pec stretch  MT performed after receiving consent from pt; she continues to have decreased STM/TPR with increased tone of L UT, and L LS compared to R side  VCs provided on proper sequencing with exercises; added LS stretch and low pec stretch  Patient demonstrated fatigue post treatment and would benefit from continued PT      Plan: Continue per plan of care        EP: supine chin tucks, upper trap stretch, SNAGs extension    Specialty Daily Treatment Diary       Manual     STM; TPR C/S paraspinals, UT, LS (R worse than L) SMF SMF SMF SMF    PA glides SMF SMF SMF SMF    Cupping; IASTM        UT, LS, C/S Flexion stretch   SMF SMF            Exercise Diary         UBE retro                 Low and high pec stretch    10 sec x 3 ea (low pec stretch)    TB Scap Retraction        TB S' Ext                B ER        B Horiz ABD                SNAGs Ext 2 sec x 10 2 sec x 10 (w/o moving arm); 2 sec x 10 (w/ moving arm) 2 sec x 10 (w/o moving arm); 2 sec x 10 (w/ moving arm)   2 sec x 10 (w/o moving arm); 2 sec x 10 (w/ moving arm)    SNAGs Rotation   10 ea 2 sec; 2x10 ea    UT, LS, SCM stretch  10 sec x 3 (UT only)  10 sec x 3 (UT stretch only) 10 sec x 3 (UT, LS stretch ea)    Seated Chin tucks Supine: 5 sec x 10 Supine c MH: 5 sec; 3x10 Supine c MH: 5 sec; 3x10 Supine c MH: 5 sec; 30    Chin tucks c extension         Seated Thoracic Extension c MB                Scaption c chin tucks                        Supine Pec stretch (Hugs to T's)  5 sec; 2x10 5 sec; 2x10 5 sec; 30            Prone T        Prone I        Prone W                Sidelying Thoracic Rotation                 Gripper                        HEP/EDU 15 mins 5 mins      Modalities        MH  10 mins in supine with chin tucks 10 mins in supine with chin tucks 10 mins in supine with chin tucks    CP        Estim           Skin checks performed pre and post application: intact

## 2021-08-06 ENCOUNTER — APPOINTMENT (OUTPATIENT)
Dept: PHYSICAL THERAPY | Facility: CLINIC | Age: 61
End: 2021-08-06
Payer: COMMERCIAL

## 2021-08-09 ENCOUNTER — OFFICE VISIT (OUTPATIENT)
Dept: PHYSICAL THERAPY | Facility: CLINIC | Age: 61
End: 2021-08-09
Payer: COMMERCIAL

## 2021-08-09 DIAGNOSIS — M54.2 CHRONIC NECK PAIN: Primary | ICD-10-CM

## 2021-08-09 DIAGNOSIS — M50.30 DEGENERATIVE DISC DISEASE, CERVICAL: ICD-10-CM

## 2021-08-09 DIAGNOSIS — G89.29 CHRONIC NECK PAIN: Primary | ICD-10-CM

## 2021-08-09 PROCEDURE — 97140 MANUAL THERAPY 1/> REGIONS: CPT

## 2021-08-09 PROCEDURE — 97110 THERAPEUTIC EXERCISES: CPT

## 2021-08-09 NOTE — PROGRESS NOTES
Daily Note     Today's date: 2021  Patient name: Noman Eckert  : 1960  MRN: [id number]  Referring provider: Kelly Tripp DO  Dx:   Encounter Diagnosis     ICD-10-CM    1  Chronic neck pain  M54 2     G89 29    2  Degenerative disc disease, cervical  M50 30                   Subjective: Patient states she is still feeling stiffness into neck when she wakes up in the morning but her tingling has improved  Objective: See treatment diary below      Assessment: Tolerated treatment well  Initiated POC in supine with MHP and chin tucks  Continued with POC as able  Good response to manuals  Encouraged patient to be stretching neck daily to avoid further stiffness  Patient demonstrated fatigue post treatment, exhibited good technique with therapeutic exercises and would benefit from continued PT      Plan: Continue per plan of care        EP: supine chin tucks, upper trap stretch, SNAGs extension    Specialty Daily Treatment Diary       Manual    STM; TPR C/S paraspinals, UT, LS (R worse than L) SMF SMF SMF SMF RA   PA glides SMF SMF SMF SMF    Cupping; IASTM        UT, LS, C/S Flexion stretch   SMF SMF RA           Exercise Diary         UBE retro                 Low and high pec stretch    10 sec x 3 ea (low pec stretch) 20 sec x 3   Ea    TB Scap Retraction        TB S' Ext                B ER        B Horiz ABD                SNAGs Ext 2 sec x 10 2 sec x 10 (w/o moving arm); 2 sec x 10 (w/ moving arm) 2 sec x 10 (w/o moving arm); 2 sec x 10 (w/ moving arm)   2 sec x 10 (w/o moving arm); 2 sec x 10 (w/ moving arm) 10 sec x 10 ea    SNAGs Rotation   10 ea 2 sec; 2x10 ea 2 sec 2x10    UT, LS, SCM stretch  10 sec x 3 (UT only)  10 sec x 3 (UT stretch only) 10 sec x 3 (UT, LS stretch ea) 30 sec x 3 (UT, LS stretch ea    Seated Chin tucks Supine: 5 sec x 10 Supine c MH: 5 sec; 3x10 Supine c MH: 5 sec; 3x10 Supine c MH: 5 sec; 30 Supine MH 5 sec x 30    Chin tucks c extension Seated Thoracic Extension c MB                Scaption c chin tucks                        Supine Pec stretch (Hugs to T's)  5 sec; 2x10 5 sec; 2x10 5 sec; 30 5 sec x 30            Prone T        Prone I        Prone W                Sidelying Thoracic Rotation                 Gripper                        HEP/EDU 15 mins 5 mins      Modalities        MH  10 mins in supine with chin tucks 10 mins in supine with chin tucks 10 mins in supine with chin tucks 10 min in supine with chin tucks    CP        Estim           Skin checks performed pre and post application: intact

## 2021-08-10 ENCOUNTER — OFFICE VISIT (OUTPATIENT)
Dept: FAMILY MEDICINE CLINIC | Facility: CLINIC | Age: 61
End: 2021-08-10
Payer: COMMERCIAL

## 2021-08-10 VITALS
HEART RATE: 71 BPM | DIASTOLIC BLOOD PRESSURE: 80 MMHG | SYSTOLIC BLOOD PRESSURE: 120 MMHG | TEMPERATURE: 98.8 F | WEIGHT: 195 LBS | HEIGHT: 67 IN | BODY MASS INDEX: 30.61 KG/M2 | OXYGEN SATURATION: 95 %

## 2021-08-10 DIAGNOSIS — E78.2 MIXED HYPERLIPIDEMIA: ICD-10-CM

## 2021-08-10 DIAGNOSIS — R11.0 NAUSEA: ICD-10-CM

## 2021-08-10 DIAGNOSIS — R53.82 CHRONIC FATIGUE: ICD-10-CM

## 2021-08-10 DIAGNOSIS — M79.10 MYALGIA: ICD-10-CM

## 2021-08-10 DIAGNOSIS — R10.13 EPIGASTRIC PAIN: Primary | ICD-10-CM

## 2021-08-10 DIAGNOSIS — I10 ESSENTIAL HYPERTENSION: ICD-10-CM

## 2021-08-10 DIAGNOSIS — K21.9 GASTROESOPHAGEAL REFLUX DISEASE WITHOUT ESOPHAGITIS: ICD-10-CM

## 2021-08-10 PROCEDURE — 1036F TOBACCO NON-USER: CPT | Performed by: FAMILY MEDICINE

## 2021-08-10 PROCEDURE — 99213 OFFICE O/P EST LOW 20 MIN: CPT | Performed by: FAMILY MEDICINE

## 2021-08-10 PROCEDURE — 3008F BODY MASS INDEX DOCD: CPT | Performed by: FAMILY MEDICINE

## 2021-08-10 RX ORDER — ONDANSETRON 4 MG/1
4 TABLET, ORALLY DISINTEGRATING ORAL EVERY 6 HOURS PRN
Qty: 20 TABLET | Refills: 0 | Status: SHIPPED | OUTPATIENT
Start: 2021-08-10 | End: 2021-10-22

## 2021-08-10 RX ORDER — PANTOPRAZOLE SODIUM 20 MG/1
20 TABLET, DELAYED RELEASE ORAL
Qty: 30 TABLET | Refills: 5 | Status: SHIPPED | OUTPATIENT
Start: 2021-08-10 | End: 2021-10-22

## 2021-08-10 NOTE — PATIENT INSTRUCTIONS
Start pantoprazole 20mg 1 tab daily  Ondansetron 1 tab under tongue up to 3 times a day before meals     Blood work Anni Cope

## 2021-08-10 NOTE — PROGRESS NOTES
Assessment/Plan:      1  Epigastric pain  Assessment & Plan:  Start pantoprazole, discussed dietary changes, call if increased symptoms    Orders:  -     CBC and differential; Future  -     pantoprazole (PROTONIX) 20 mg tablet; Take 1 tablet (20 mg total) by mouth daily before breakfast  -     Comprehensive metabolic panel; Future  -     CBC and differential  -     Comprehensive metabolic panel    2  Gastroesophageal reflux disease without esophagitis    3  Essential hypertension  Assessment & Plan:  Controlled, continue current medication      4  Nausea  Assessment & Plan:  Ondansetron 1 tab under tongue as needed     Orders:  -     ondansetron (ZOFRAN-ODT) 4 mg disintegrating tablet; Take 1 tablet (4 mg total) by mouth every 6 (six) hours as needed for nausea or vomiting  -     Comprehensive metabolic panel; Future  -     Comprehensive metabolic panel    5  Myalgia  -     TSH, 3rd generation with Free T4 reflex; Future  -     Lyme Antibody Profile with reflex to WB; Future  -     TSH, 3rd generation with Free T4 reflex  -     Lyme Antibody Profile with reflex to WB    6  Chronic fatigue  -     TSH, 3rd generation with Free T4 reflex; Future  -     TSH, 3rd generation with Free T4 reflex    7  Mixed hyperlipidemia  -     Lipid Panel with Direct LDL reflex        Subjective:  Chief Complaint   Patient presents with    Abdominal Pain     PT CALLS IN WITH ABDOMINAL PAIN AND FEELS SICK TO HER STOMACH DAILY WITH HEADACHE  FEVER ON/OFF LOW GRADE 100 1  X 1 MONTH  LATE NIGHT CHILLS   NO APPETITE  BOWELS NORMAL  NO URINARY ISSUES  Patient ID: Brodie Castaneda is a 62 y o  female  Pt here to discuss abdominal pain  At the end of June, pt had abdominal pain for 1/2 hour intermittent and resolved  For the past week, eating makes her feel crampy   No heartburn, she does have some hiccuping and  indigestion,   Normal bms no constipation, no diarrhea,   By morning feeling better     Complains of Nausea, decreased appetite  Headache intermittent  Took aleve-for fever, headache       Review of Systems   Constitutional: Positive for fever  Negative for fatigue  HENT: Negative  Eyes: Negative  Respiratory: Negative  Negative for cough  Cardiovascular: Negative  Gastrointestinal: Positive for abdominal pain and nausea  Negative for anal bleeding, blood in stool, constipation and diarrhea  Endocrine: Negative  Genitourinary: Negative  Musculoskeletal: Negative  Skin: Negative  Allergic/Immunologic: Negative  Neurological: Positive for headaches  Psychiatric/Behavioral: Negative  The following portions of the patient's history were reviewed and updated as appropriate: allergies, current medications, past family history, past medical history, past social history, past surgical history and problem list     Objective:  Vitals:    08/10/21 1441 08/10/21 1526   BP:  120/80   Pulse: 71    Temp: 98 8 °F (37 1 °C)    SpO2: 95%    Weight: 88 5 kg (195 lb)    Height: 5' 7" (1 702 m)       Physical Exam  Vitals and nursing note reviewed  Constitutional:       Appearance: She is well-developed  HENT:      Head: Normocephalic and atraumatic  Cardiovascular:      Rate and Rhythm: Normal rate and regular rhythm  Heart sounds: Normal heart sounds  Pulmonary:      Effort: Pulmonary effort is normal       Breath sounds: Normal breath sounds  Abdominal:      General: Bowel sounds are normal       Palpations: Abdomen is soft  Comments: Non reproducible tenderness    Skin:     General: Skin is warm and dry  Neurological:      Mental Status: She is alert and oriented to person, place, and time  Psychiatric:         Behavior: Behavior normal          Thought Content:  Thought content normal          Judgment: Judgment normal

## 2021-08-11 ENCOUNTER — APPOINTMENT (OUTPATIENT)
Dept: PHYSICAL THERAPY | Facility: CLINIC | Age: 61
End: 2021-08-11
Payer: COMMERCIAL

## 2021-08-12 LAB
ALBUMIN SERPL-MCNC: 4.5 G/DL (ref 3.8–4.9)
ALBUMIN/GLOB SERPL: 1.9 {RATIO} (ref 1.2–2.2)
ALP SERPL-CCNC: 80 IU/L (ref 48–121)
ALT SERPL-CCNC: 16 IU/L (ref 0–32)
AST SERPL-CCNC: 22 IU/L (ref 0–40)
B BURGDOR IGG+IGM SER-ACNC: <0.91 ISR (ref 0–0.9)
BASOPHILS # BLD AUTO: 0 X10E3/UL (ref 0–0.2)
BASOPHILS NFR BLD AUTO: 1 %
BILIRUB SERPL-MCNC: 0.6 MG/DL (ref 0–1.2)
BUN SERPL-MCNC: 18 MG/DL (ref 8–27)
BUN/CREAT SERPL: 19 (ref 12–28)
CALCIUM SERPL-MCNC: 9.5 MG/DL (ref 8.7–10.3)
CHLORIDE SERPL-SCNC: 98 MMOL/L (ref 96–106)
CHOLEST SERPL-MCNC: 205 MG/DL (ref 100–199)
CO2 SERPL-SCNC: 27 MMOL/L (ref 20–29)
CREAT SERPL-MCNC: 0.94 MG/DL (ref 0.57–1)
EOSINOPHIL # BLD AUTO: 0 X10E3/UL (ref 0–0.4)
EOSINOPHIL NFR BLD AUTO: 1 %
ERYTHROCYTE [DISTWIDTH] IN BLOOD BY AUTOMATED COUNT: 12.6 % (ref 11.7–15.4)
GLOBULIN SER-MCNC: 2.4 G/DL (ref 1.5–4.5)
GLUCOSE SERPL-MCNC: 91 MG/DL (ref 65–99)
HCT VFR BLD AUTO: 40.9 % (ref 34–46.6)
HDLC SERPL-MCNC: 72 MG/DL
HGB BLD-MCNC: 13.6 G/DL (ref 11.1–15.9)
IMM GRANULOCYTES # BLD: 0 X10E3/UL (ref 0–0.1)
IMM GRANULOCYTES NFR BLD: 0 %
LDLC SERPL CALC-MCNC: 122 MG/DL (ref 0–99)
LDLC/HDLC SERPL: 1.7 RATIO (ref 0–3.2)
LYMPHOCYTES # BLD AUTO: 1.3 X10E3/UL (ref 0.7–3.1)
LYMPHOCYTES NFR BLD AUTO: 28 %
MCH RBC QN AUTO: 29 PG (ref 26.6–33)
MCHC RBC AUTO-ENTMCNC: 33.3 G/DL (ref 31.5–35.7)
MCV RBC AUTO: 87 FL (ref 79–97)
MONOCYTES # BLD AUTO: 0.6 X10E3/UL (ref 0.1–0.9)
MONOCYTES NFR BLD AUTO: 12 %
NEUTROPHILS # BLD AUTO: 2.7 X10E3/UL (ref 1.4–7)
NEUTROPHILS NFR BLD AUTO: 58 %
PLATELET # BLD AUTO: 241 X10E3/UL (ref 150–450)
POTASSIUM SERPL-SCNC: 4 MMOL/L (ref 3.5–5.2)
PROT SERPL-MCNC: 6.9 G/DL (ref 6–8.5)
RBC # BLD AUTO: 4.69 X10E6/UL (ref 3.77–5.28)
SL AMB EGFR AFRICAN AMERICAN: 76 ML/MIN/1.73
SL AMB EGFR NON AFRICAN AMERICAN: 66 ML/MIN/1.73
SL AMB VLDL CHOLESTEROL CALC: 11 MG/DL (ref 5–40)
SODIUM SERPL-SCNC: 140 MMOL/L (ref 134–144)
TRIGL SERPL-MCNC: 59 MG/DL (ref 0–149)
TSH SERPL DL<=0.005 MIU/L-ACNC: 1.22 UIU/ML (ref 0.45–4.5)
WBC # BLD AUTO: 4.6 X10E3/UL (ref 3.4–10.8)

## 2021-08-13 DIAGNOSIS — I10 ESSENTIAL HYPERTENSION: ICD-10-CM

## 2021-08-13 RX ORDER — HYDROCHLOROTHIAZIDE 12.5 MG/1
TABLET ORAL
Qty: 30 TABLET | Refills: 5 | Status: SHIPPED | OUTPATIENT
Start: 2021-08-13 | End: 2022-02-12

## 2021-08-16 ENCOUNTER — APPOINTMENT (OUTPATIENT)
Dept: PHYSICAL THERAPY | Facility: CLINIC | Age: 61
End: 2021-08-16
Payer: COMMERCIAL

## 2021-08-17 PROBLEM — E78.2 MIXED HYPERLIPIDEMIA: Status: ACTIVE | Noted: 2021-08-17

## 2021-08-17 PROBLEM — R10.13 EPIGASTRIC PAIN: Status: ACTIVE | Noted: 2021-08-17

## 2021-08-17 PROBLEM — R11.0 NAUSEA: Status: ACTIVE | Noted: 2021-08-17

## 2021-08-17 PROBLEM — M79.10 MYALGIA: Status: ACTIVE | Noted: 2021-08-17

## 2021-08-17 PROBLEM — R53.82 CHRONIC FATIGUE: Status: ACTIVE | Noted: 2021-08-17

## 2021-08-18 ENCOUNTER — APPOINTMENT (OUTPATIENT)
Dept: PHYSICAL THERAPY | Facility: CLINIC | Age: 61
End: 2021-08-18
Payer: COMMERCIAL

## 2021-08-24 ENCOUNTER — APPOINTMENT (OUTPATIENT)
Dept: PHYSICAL THERAPY | Facility: CLINIC | Age: 61
End: 2021-08-24
Payer: COMMERCIAL

## 2021-08-26 ENCOUNTER — APPOINTMENT (OUTPATIENT)
Dept: PHYSICAL THERAPY | Facility: CLINIC | Age: 61
End: 2021-08-26
Payer: COMMERCIAL

## 2021-08-30 ENCOUNTER — APPOINTMENT (OUTPATIENT)
Dept: PHYSICAL THERAPY | Facility: CLINIC | Age: 61
End: 2021-08-30
Payer: COMMERCIAL

## 2021-10-01 ENCOUNTER — TELEPHONE (OUTPATIENT)
Dept: FAMILY MEDICINE CLINIC | Facility: CLINIC | Age: 61
End: 2021-10-01

## 2021-10-01 DIAGNOSIS — N30.00 ACUTE CYSTITIS WITHOUT HEMATURIA: Primary | ICD-10-CM

## 2021-10-01 RX ORDER — NITROFURANTOIN 25; 75 MG/1; MG/1
100 CAPSULE ORAL 2 TIMES DAILY
Qty: 14 CAPSULE | Refills: 0 | Status: SHIPPED | OUTPATIENT
Start: 2021-10-01 | End: 2021-10-08

## 2021-10-22 ENCOUNTER — ANNUAL EXAM (OUTPATIENT)
Dept: OBGYN CLINIC | Facility: CLINIC | Age: 61
End: 2021-10-22
Payer: COMMERCIAL

## 2021-10-22 VITALS
WEIGHT: 199 LBS | BODY MASS INDEX: 31.98 KG/M2 | SYSTOLIC BLOOD PRESSURE: 120 MMHG | HEIGHT: 66 IN | DIASTOLIC BLOOD PRESSURE: 80 MMHG

## 2021-10-22 DIAGNOSIS — D25.2 FIBROIDS, SUBSEROUS: ICD-10-CM

## 2021-10-22 DIAGNOSIS — E66.09 CLASS 1 OBESITY DUE TO EXCESS CALORIES WITH SERIOUS COMORBIDITY AND BODY MASS INDEX (BMI) OF 31.0 TO 31.9 IN ADULT: ICD-10-CM

## 2021-10-22 DIAGNOSIS — Z01.419 ROUTINE GYNECOLOGICAL EXAMINATION: Primary | ICD-10-CM

## 2021-10-22 DIAGNOSIS — Z12.31 ENCOUNTER FOR SCREENING MAMMOGRAM FOR MALIGNANT NEOPLASM OF BREAST: ICD-10-CM

## 2021-10-22 PROCEDURE — S0612 ANNUAL GYNECOLOGICAL EXAMINA: HCPCS | Performed by: OBSTETRICS & GYNECOLOGY

## 2021-11-16 DIAGNOSIS — I10 ESSENTIAL HYPERTENSION: ICD-10-CM

## 2021-11-16 RX ORDER — LOSARTAN POTASSIUM 50 MG/1
TABLET ORAL
Qty: 30 TABLET | Refills: 5 | Status: SHIPPED | OUTPATIENT
Start: 2021-11-16 | End: 2022-05-11

## 2021-12-10 ENCOUNTER — OFFICE VISIT (OUTPATIENT)
Dept: FAMILY MEDICINE CLINIC | Facility: CLINIC | Age: 61
End: 2021-12-10
Payer: COMMERCIAL

## 2021-12-10 VITALS
HEART RATE: 64 BPM | TEMPERATURE: 97.6 F | SYSTOLIC BLOOD PRESSURE: 110 MMHG | WEIGHT: 199 LBS | HEIGHT: 66 IN | BODY MASS INDEX: 31.98 KG/M2 | DIASTOLIC BLOOD PRESSURE: 70 MMHG | OXYGEN SATURATION: 98 %

## 2021-12-10 DIAGNOSIS — L03.011 PARONYCHIA OF RIGHT MIDDLE FINGER: ICD-10-CM

## 2021-12-10 DIAGNOSIS — Z00.00 WELL ADULT EXAM: Primary | ICD-10-CM

## 2021-12-10 DIAGNOSIS — M67.472 GANGLION CYST OF LEFT FOOT: ICD-10-CM

## 2021-12-10 DIAGNOSIS — I10 ESSENTIAL HYPERTENSION: ICD-10-CM

## 2021-12-10 PROBLEM — Z13.220 SCREENING FOR LIPID DISORDERS: Status: RESOLVED | Noted: 2020-06-16 | Resolved: 2021-12-10

## 2021-12-10 PROBLEM — Z13.0 SCREENING FOR DEFICIENCY ANEMIA: Status: RESOLVED | Noted: 2020-06-16 | Resolved: 2021-12-10

## 2021-12-10 PROBLEM — Z13.29 SCREENING FOR THYROID DISORDER: Status: RESOLVED | Noted: 2021-05-18 | Resolved: 2021-12-10

## 2021-12-10 PROBLEM — Z13.1 SCREENING FOR DIABETES MELLITUS (DM): Status: RESOLVED | Noted: 2020-06-16 | Resolved: 2021-12-10

## 2021-12-10 PROCEDURE — 99396 PREV VISIT EST AGE 40-64: CPT | Performed by: FAMILY MEDICINE

## 2021-12-10 PROCEDURE — 3008F BODY MASS INDEX DOCD: CPT | Performed by: FAMILY MEDICINE

## 2021-12-10 PROCEDURE — 1036F TOBACCO NON-USER: CPT | Performed by: FAMILY MEDICINE

## 2022-01-26 ENCOUNTER — OFFICE VISIT (OUTPATIENT)
Dept: FAMILY MEDICINE CLINIC | Facility: CLINIC | Age: 62
End: 2022-01-26
Payer: COMMERCIAL

## 2022-01-26 VITALS
OXYGEN SATURATION: 97 % | TEMPERATURE: 97.4 F | SYSTOLIC BLOOD PRESSURE: 110 MMHG | BODY MASS INDEX: 32.14 KG/M2 | HEIGHT: 66 IN | DIASTOLIC BLOOD PRESSURE: 64 MMHG | WEIGHT: 200 LBS | HEART RATE: 65 BPM

## 2022-01-26 DIAGNOSIS — M26.609 TMJ (TEMPOROMANDIBULAR JOINT DISORDER): Primary | ICD-10-CM

## 2022-01-26 PROBLEM — R13.12 OROPHARYNGEAL DYSPHAGIA: Status: RESOLVED | Noted: 2019-12-17 | Resolved: 2022-01-26

## 2022-01-26 PROCEDURE — 1036F TOBACCO NON-USER: CPT | Performed by: PHYSICIAN ASSISTANT

## 2022-01-26 PROCEDURE — 99213 OFFICE O/P EST LOW 20 MIN: CPT | Performed by: PHYSICIAN ASSISTANT

## 2022-01-26 PROCEDURE — 3008F BODY MASS INDEX DOCD: CPT | Performed by: PHYSICIAN ASSISTANT

## 2022-01-26 PROCEDURE — 3725F SCREEN DEPRESSION PERFORMED: CPT | Performed by: PHYSICIAN ASSISTANT

## 2022-01-26 RX ORDER — CLONAZEPAM 0.5 MG/1
0.25 TABLET ORAL 2 TIMES DAILY
Qty: 30 TABLET | Refills: 2 | Status: SHIPPED | OUTPATIENT
Start: 2022-01-26 | End: 2022-04-22 | Stop reason: ALTCHOICE

## 2022-01-26 NOTE — PROGRESS NOTES
Assessment/Plan:       Problem List Items Addressed This Visit     None      Visit Diagnoses     TMJ (temporomandibular joint disorder)    -  Primary- suspect 2nd  To stress  Will try low dose Klonopin 0 25 bid, prn pain-       Relevant Medications    clonazePAM (KlonoPIN) 0 5 mg tablet--  0 25 bid, prn pain        Recommend vitamin D 2000 IU for better bone health  Subjective:      Patient ID: Art Bull is a 62 y o  female  C/o jaw pain worse past 4 weeks, but going on for years  Saw dentist, got mouth guard  Jaw "locks up", no trouble swallowing  Review of Systems   Constitutional: Negative for chills, diaphoresis, fatigue and fever  HENT: Positive for ear pain  Negative for congestion, dental problem, rhinorrhea, sinus pressure, sinus pain, sore throat and trouble swallowing  Left ear pain, radiating from jaw pain  Respiratory: Positive for cough  Negative for chest tightness and shortness of breath  Cough lingering from COVID, getting better  Gastrointestinal: Negative for abdominal pain, constipation, diarrhea, nausea and vomiting  Neurological: Positive for headaches  Negative for dizziness and light-headedness  Has a faint headache  Psychiatric/Behavioral: Negative for dysphoric mood, self-injury, sleep disturbance and suicidal ideas  The patient is not nervous/anxious  Objective:      /64   Pulse 65   Temp (!) 97 4 °F (36 3 °C)   Ht 5' 6" (1 676 m)   Wt 90 7 kg (200 lb)   SpO2 97%   BMI 32 28 kg/m²          Physical Exam  Vitals reviewed  Constitutional:       General: She is not in acute distress  Appearance: Normal appearance  She is not ill-appearing, toxic-appearing or diaphoretic  HENT:      Head: Normocephalic and atraumatic  Comments: Clicking noted when patient opens mouth wide, at TMJ- left side  Right Ear: Ear canal and external ear normal  There is no impacted cerumen        Left Ear: Ear canal and external ear normal  There is no impacted cerumen  Ears:      Comments: TM erythematous bilaterally  Nose: Nose normal  No congestion or rhinorrhea  Mouth/Throat:      Mouth: Mucous membranes are moist       Pharynx: No oropharyngeal exudate or posterior oropharyngeal erythema  Eyes:      General: No scleral icterus  Right eye: No discharge  Left eye: No discharge  Extraocular Movements: Extraocular movements intact  Conjunctiva/sclera: Conjunctivae normal    Cardiovascular:      Rate and Rhythm: Normal rate and regular rhythm  Pulses: Normal pulses  Heart sounds: Normal heart sounds  Pulmonary:      Effort: Pulmonary effort is normal  No respiratory distress  Breath sounds: Normal breath sounds  No stridor  No wheezing or rhonchi  Musculoskeletal:         General: No swelling, tenderness, deformity or signs of injury  Normal range of motion  Cervical back: Normal range of motion  No rigidity or tenderness  Right lower leg: No edema  Left lower leg: No edema  Lymphadenopathy:      Cervical: No cervical adenopathy  Neurological:      General: No focal deficit present  Mental Status: She is alert and oriented to person, place, and time

## 2022-04-22 ENCOUNTER — OFFICE VISIT (OUTPATIENT)
Dept: FAMILY MEDICINE CLINIC | Facility: CLINIC | Age: 62
End: 2022-04-22
Payer: COMMERCIAL

## 2022-04-22 VITALS
HEIGHT: 66 IN | HEART RATE: 84 BPM | TEMPERATURE: 95.8 F | OXYGEN SATURATION: 97 % | SYSTOLIC BLOOD PRESSURE: 110 MMHG | BODY MASS INDEX: 32.62 KG/M2 | WEIGHT: 203 LBS | DIASTOLIC BLOOD PRESSURE: 80 MMHG

## 2022-04-22 DIAGNOSIS — K21.9 GASTROESOPHAGEAL REFLUX DISEASE WITHOUT ESOPHAGITIS: ICD-10-CM

## 2022-04-22 DIAGNOSIS — R00.2 PALPITATION: Primary | ICD-10-CM

## 2022-04-22 DIAGNOSIS — I10 ESSENTIAL HYPERTENSION: ICD-10-CM

## 2022-04-22 PROBLEM — Z00.00 WELL ADULT EXAM: Status: RESOLVED | Noted: 2018-09-05 | Resolved: 2022-04-22

## 2022-04-22 PROBLEM — R53.82 CHRONIC FATIGUE: Status: RESOLVED | Noted: 2021-08-17 | Resolved: 2022-04-22

## 2022-04-22 PROBLEM — G89.29 CHRONIC NECK PAIN: Status: RESOLVED | Noted: 2021-06-20 | Resolved: 2022-04-22

## 2022-04-22 PROBLEM — M54.2 CHRONIC NECK PAIN: Status: RESOLVED | Noted: 2021-06-20 | Resolved: 2022-04-22

## 2022-04-22 PROBLEM — R11.0 NAUSEA: Status: RESOLVED | Noted: 2021-08-17 | Resolved: 2022-04-22

## 2022-04-22 PROCEDURE — 3008F BODY MASS INDEX DOCD: CPT | Performed by: FAMILY MEDICINE

## 2022-04-22 PROCEDURE — 1036F TOBACCO NON-USER: CPT | Performed by: FAMILY MEDICINE

## 2022-04-22 PROCEDURE — 99214 OFFICE O/P EST MOD 30 MIN: CPT | Performed by: FAMILY MEDICINE

## 2022-04-22 NOTE — PROGRESS NOTES
Assessment/Plan:      1  Palpitation  Assessment & Plan:  Consider holter monitor  Pt will cut out green tea at dinner  And increase water intake      2  Essential hypertension  Assessment & Plan:  Controlled on losartan and hctz  Consider changing to amlodipine for rhythm control  Pt does not want to take metoprolol  Mother had problems with that medication  3  Gastroesophageal reflux disease without esophagitis  Assessment & Plan:  Dietary control, bland diet          Subjective:  Chief Complaint   Patient presents with    Follow-up     feeling some irregular heart bits over a months comes and goes  some acid reflux since 3 weeks ago, like you to look on her right upper leg pt feels probably some rises areas        Patient ID: Jama Ford is a 65 y o  female  Notices some palpitations, has had them for many years but has been more often but not daily   No symptoms with it, no dizziness, no shortness of breath  Mom has atrial fibrillation  Drinks slimfast in am  Drinks green tea for dinner every night  Intermittent reflux  Review of Systems   Constitutional: Negative  Negative for fatigue and fever  HENT: Negative  Eyes: Negative  Respiratory: Negative  Negative for cough  Cardiovascular: Positive for palpitations  Negative for chest pain  Gastrointestinal: Negative  Endocrine: Negative  Genitourinary: Negative  Musculoskeletal: Negative  Skin: Negative  Allergic/Immunologic: Negative  Neurological: Negative  Psychiatric/Behavioral: Negative            The following portions of the patient's history were reviewed and updated as appropriate: allergies, current medications, past family history, past medical history, past social history, past surgical history and problem list     Objective:  Vitals:    04/22/22 1228   BP: 110/80   Pulse: 84   Temp: (!) 95 8 °F (35 4 °C)   SpO2: 97%   Weight: 92 1 kg (203 lb)   Height: 5' 6" (1 676 m)      Physical Exam  Vitals and nursing note reviewed  Constitutional:       Appearance: She is well-developed  HENT:      Head: Normocephalic and atraumatic  Cardiovascular:      Rate and Rhythm: Normal rate and regular rhythm  Heart sounds: Normal heart sounds  Pulmonary:      Effort: Pulmonary effort is normal       Breath sounds: Normal breath sounds  Abdominal:      General: Bowel sounds are normal       Palpations: Abdomen is soft  Skin:     General: Skin is warm and dry  Neurological:      Mental Status: She is alert and oriented to person, place, and time  Psychiatric:         Behavior: Behavior normal          Thought Content:  Thought content normal          Judgment: Judgment normal

## 2022-04-22 NOTE — PATIENT INSTRUCTIONS
Consider holter monitor   Avoid caffeine     GERD (Gastroesophageal Reflux Disease)   AMBULATORY CARE:   Gastroesophageal reflux disease (GERD)  is reflux that happens more than 2 times a week for a few weeks  Reflux means acid and food in your stomach back up into your esophagus  GERD can cause other health problems over time if it is not treated  Common causes of GERD:  GERD often happens because the lower muscle (sphincter) of the esophagus does not close properly  The sphincter normally opens to let food into the stomach  It then closes to keep food and stomach acid in the stomach  If the sphincter does not close properly, stomach acid and food back up (reflux) into the esophagus  The following may increase your risk for GERD:  · Certain foods such as spicy foods, chocolate, foods that contain caffeine, peppermint, and fried foods    · Hiatal hernia    · Certain medicines such as calcium channel blockers (used to treat high blood pressure), allergy medicines, sedatives, or antidepressants    · Pregnancy, obesity, or scleroderma    · Lying down after a meal    · Drinking alcohol or smoking cigarettes    Signs and symptoms:   · Heartburn (burning pain in your chest)    · Pain after meals that spreads to your neck, jaw, or shoulder    · Pain that gets better when you change positions    · Bitter or acid taste in your mouth    · A dry cough    · Trouble swallowing or pain with swallowing    · Hoarseness or a sore throat    · Burping or hiccups    · Feeling full soon after you start eating    Call your local emergency number (911 in the [address] [place] [address],[address]) if:   · You have severe chest pain and sudden trouble breathing  Seek care immediately if:   · You have trouble breathing after you vomit  · You have trouble swallowing, or pain with swallowing  · Your bowel movements are black, bloody, or tarry-looking  · Your vomit looks like coffee grounds or has blood in it      Call your doctor or gastroenterologist if: · You feel full and cannot burp or vomit  · You vomit large amounts, or you vomit often  · You are losing weight without trying  · Your symptoms get worse or do not improve with treatment  · You have questions or concerns about your condition or care  Treatment for GERD:   · Medicines  are used to decrease stomach acid  Medicine may also be used to help your lower esophageal sphincter and stomach contract (tighten) more  · Surgery  is done to wrap the upper part of the stomach around the esophageal sphincter  This will strengthen the sphincter and prevent reflux  Manage GERD:       · Do not have foods or drinks that may increase heartburn  These include chocolate, peppermint, fried or fatty foods, drinks that contain caffeine, or carbonated drinks (soda)  Other foods include spicy foods, onions, tomatoes, and tomato-based foods  Do not have foods or drinks that can irritate your esophagus, such as citrus fruits, juices, and alcohol  · Do not eat large meals  When you eat a lot of food at one time, your stomach needs more acid to digest it  Eat 6 small meals each day instead of 3 large meals, and eat slowly  Do not eat meals 2 to 3 hours before bedtime  · Elevate the head of your bed  Place 6-inch blocks under the head of your bed frame  You may also use more than one pillow under your head and shoulders while you sleep  · Maintain a healthy weight  If you are overweight, weight loss may help relieve symptoms of GERD  · Do not smoke  Smoking weakens the lower esophageal sphincter and increases the risk of GERD  Ask your healthcare provider for information if you currently smoke and need help to quit  E-cigarettes or smokeless tobacco still contain nicotine  Talk to your healthcare provider before you use these products  · Do not put pressure on your abdomen  Pressure pushes acid up into your esophagus  Do not wear clothing that is tight around your waist  Do not bend over  Bend at the knees if you need to pick something up  Follow up with your doctor or gastroenterologist as directed:  Write down your questions so you remember to ask them during your visits  © Copyright [employer] 2022 Information is for End User's use only and may not be sold, redistributed or otherwise used for commercial purposes  All illustrations and images included in CareNotes® are the copyrighted property of A [employer] M [employer] Inc  or [employer]   The above information is an  only  It is not intended as medical advice for individual conditions or treatments  Talk to your doctor, nurse or pharmacist before following any medical regimen to see if it is safe and effective for you

## 2022-04-23 NOTE — ASSESSMENT & PLAN NOTE
Controlled on losartan and hctz  Consider changing to amlodipine for rhythm control  Pt does not want to take metoprolol  Mother had problems with that medication

## 2022-04-29 ENCOUNTER — VBI (OUTPATIENT)
Dept: ADMINISTRATIVE | Facility: OTHER | Age: 62
End: 2022-04-29

## 2022-05-11 DIAGNOSIS — I10 ESSENTIAL HYPERTENSION: ICD-10-CM

## 2022-05-11 RX ORDER — LOSARTAN POTASSIUM 50 MG/1
TABLET ORAL
Qty: 30 TABLET | Refills: 5 | Status: SHIPPED | OUTPATIENT
Start: 2022-05-11

## 2022-10-11 ENCOUNTER — OFFICE VISIT (OUTPATIENT)
Dept: FAMILY MEDICINE CLINIC | Facility: CLINIC | Age: 62
End: 2022-10-11
Payer: COMMERCIAL

## 2022-10-11 VITALS
TEMPERATURE: 97 F | HEIGHT: 66 IN | BODY MASS INDEX: 32.3 KG/M2 | HEART RATE: 74 BPM | OXYGEN SATURATION: 97 % | DIASTOLIC BLOOD PRESSURE: 70 MMHG | WEIGHT: 201 LBS | SYSTOLIC BLOOD PRESSURE: 110 MMHG

## 2022-10-11 DIAGNOSIS — Z12.31 ENCOUNTER FOR SCREENING MAMMOGRAM FOR BREAST CANCER: ICD-10-CM

## 2022-10-11 DIAGNOSIS — R53.82 CHRONIC FATIGUE: ICD-10-CM

## 2022-10-11 DIAGNOSIS — I10 ESSENTIAL HYPERTENSION: ICD-10-CM

## 2022-10-11 DIAGNOSIS — Z13.220 SCREENING FOR LIPID DISORDERS: ICD-10-CM
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
## 2022-10-11 DIAGNOSIS — T14.8XXA HEMATOMA: ICD-10-CM

## 2022-10-11 DIAGNOSIS — Z13.0 SCREENING FOR DEFICIENCY ANEMIA: ICD-10-CM

## 2022-10-11 DIAGNOSIS — E78.2 MIXED HYPERLIPIDEMIA: ICD-10-CM

## 2022-10-11 DIAGNOSIS — Z13.29 SCREENING FOR THYROID DISORDER: ICD-10-CM

## 2022-10-11 PROCEDURE — 99214 OFFICE O/P EST MOD 30 MIN: CPT | Performed by: FAMILY MEDICINE

## 2022-10-11 NOTE — PROGRESS NOTES
Assessment/Plan:      1  Injury due to fall, initial encounter    2  Hematoma  Assessment & Plan:  Observe for continued improvement      3  Essential hypertension  Assessment & Plan:  Controlled, continue current medication    Orders:  -     CBC and differential; Future  -     Lipid panel; Future  -     Comprehensive metabolic panel; Future  -     TSH, 3rd generation with Free T4 reflex; Future  -     CBC and differential  -     Lipid panel  -     Comprehensive metabolic panel  -     TSH, 3rd generation with Free T4 reflex    4  Chronic fatigue  -     CBC and differential; Future  -     Lipid panel; Future  -     Comprehensive metabolic panel; Future  -     TSH, 3rd generation with Free T4 reflex; Future  -     CBC and differential  -     Lipid panel  -     Comprehensive metabolic panel  -     TSH, 3rd generation with Free T4 reflex    5  Mixed hyperlipidemia  -     CBC and differential; Future  -     Lipid panel; Future  -     Comprehensive metabolic panel; Future  -     TSH, 3rd generation with Free T4 reflex; Future  -     CBC and differential  -     Lipid panel  -     Comprehensive metabolic panel  -     TSH, 3rd generation with Free T4 reflex    6  Screening for thyroid disorder  -     TSH, 3rd generation with Free T4 reflex; Future  -     TSH, 3rd generation with Free T4 reflex    7  Screening for lipid disorders  -     Lipid panel; Future  -     Lipid panel    8  Screening for deficiency anemia  -     CBC and differential; Future  -     Comprehensive metabolic panel; Future  -     CBC and differential  -     Comprehensive metabolic panel    9   Encounter for screening mammogram for breast cancer  -     Mammo screening bilateral w 3d & cad; Future; Expected date: 10/11/2022        Subjective:  Chief Complaint   Patient presents with   • Follow-up     Bump on 2 right leg and 1 on the leg, x 2 week,   Fall x 2 weeks, right bud had a a bruises and had pain when seat down,         Patient ID: Martin White is a 65 y o  female  Noticed a lump in her right leg  Not painful  No change in size  Also has a growth on her toe on her right foot  Pt also feel and has a bruise on her right buttock  Is still painful when sitting  Is a   No head trauma  Review of Systems   Constitutional: Negative  Negative for fatigue and fever  HENT: Negative  Eyes: Negative  Respiratory: Negative  Negative for cough  Cardiovascular: Negative  Gastrointestinal: Negative  Endocrine: Negative  Genitourinary: Negative  Musculoskeletal: Negative  Allergic/Immunologic: Negative  Neurological: Negative  Psychiatric/Behavioral: Negative  The following portions of the patient's history were reviewed and updated as appropriate: allergies, current medications, past family history, past medical history, past social history, past surgical history and problem list     Objective:  Vitals:    10/11/22 1016   BP: 110/70   Pulse: 74   Temp: (!) 97 °F (36 1 °C)   SpO2: 97%   Weight: 91 2 kg (201 lb)   Height: 5' 6" (1 676 m)      Physical Exam  Vitals and nursing note reviewed  Constitutional:       Appearance: She is well-developed  HENT:      Head: Normocephalic and atraumatic  Cardiovascular:      Rate and Rhythm: Normal rate and regular rhythm  Heart sounds: Normal heart sounds  Pulmonary:      Effort: Pulmonary effort is normal       Breath sounds: Normal breath sounds  Abdominal:      General: Bowel sounds are normal       Palpations: Abdomen is soft  Skin:     General: Skin is warm and dry  Comments: Moveable 0 3cm area anterior right thigh, nontender  Right 3rd toe cystic area between pip and dip  0 3cm   Neurological:      Mental Status: She is alert and oriented to person, place, and time  Psychiatric:         Behavior: Behavior normal          Thought Content:  Thought content normal          Judgment: Judgment normal

## 2022-10-25 ENCOUNTER — APPOINTMENT (OUTPATIENT)
Dept: RADIOLOGY | Facility: CLINIC | Age: 62
End: 2022-10-25

## 2022-10-25 ENCOUNTER — OFFICE VISIT (OUTPATIENT)
Dept: URGENT CARE | Facility: CLINIC | Age: 62
End: 2022-10-25
Payer: COMMERCIAL

## 2022-10-25 VITALS
RESPIRATION RATE: 18 BRPM | HEIGHT: 66 IN | OXYGEN SATURATION: 96 % | DIASTOLIC BLOOD PRESSURE: 69 MMHG | BODY MASS INDEX: 32.75 KG/M2 | SYSTOLIC BLOOD PRESSURE: 153 MMHG | HEART RATE: 82 BPM | WEIGHT: 203.8 LBS | TEMPERATURE: 97.8 F

## 2022-10-25 DIAGNOSIS — J40 BRONCHITIS: Primary | ICD-10-CM

## 2022-10-25 DIAGNOSIS — R07.89 CHEST WALL PAIN: ICD-10-CM

## 2022-10-25 DIAGNOSIS — Z20.822 ENCOUNTER FOR LABORATORY TESTING FOR COVID-19 VIRUS: ICD-10-CM

## 2022-10-25 DIAGNOSIS — R07.81 RIB PAIN: ICD-10-CM

## 2022-10-25 LAB
SARS-COV-2 AG UPPER RESP QL IA: NEGATIVE
VALID CONTROL: NORMAL

## 2022-10-25 PROCEDURE — 87811 SARS-COV-2 COVID19 W/OPTIC: CPT

## 2022-10-25 PROCEDURE — 99213 OFFICE O/P EST LOW 20 MIN: CPT | Performed by: PHYSICIAN ASSISTANT

## 2022-10-25 RX ORDER — PREDNISONE 10 MG/1
TABLET ORAL
Qty: 20 TABLET | Refills: 0 | Status: SHIPPED | OUTPATIENT
Start: 2022-10-25

## 2022-10-25 RX ORDER — AZITHROMYCIN 250 MG/1
TABLET, FILM COATED ORAL
Qty: 6 TABLET | Refills: 0 | Status: SHIPPED | OUTPATIENT
Start: 2022-10-25 | End: 2022-10-29

## 2022-10-25 RX ORDER — ALBUTEROL SULFATE 90 UG/1
2 AEROSOL, METERED RESPIRATORY (INHALATION) EVERY 6 HOURS PRN
Qty: 8.5 G | Refills: 0 | Status: SHIPPED | OUTPATIENT
Start: 2022-10-25

## 2022-10-25 RX ORDER — BENZONATATE 100 MG/1
100 CAPSULE ORAL 3 TIMES DAILY PRN
Qty: 20 CAPSULE | Refills: 0 | Status: SHIPPED | OUTPATIENT
Start: 2022-10-25

## 2022-10-25 NOTE — PROGRESS NOTES
[facility]        NAME: Agatha Shaw is a 65 y o  female  : 1960    MRN: [id number]  DATE: 2022  TIME: 6:55 PM    /69   Pulse 82   Temp 97 8 °F (36 6 °C)   Resp 18   Ht 5' 6" (1 676 m)   Wt 92 4 kg (203 lb 12 8 oz)   SpO2 96%   BMI 32 89 kg/m²     Assessment and Plan   Bronchitis [J40]  1  Bronchitis  XR chest pa & lateral    azithromycin (ZITHROMAX) 250 mg tablet    benzonatate (TESSALON PERLES) 100 mg capsule    predniSONE 10 mg tablet    albuterol (ProAir HFA) 90 mcg/act inhaler   2  Chest wall pain  Poct Covid 19 Rapid Antigen Test    azithromycin (ZITHROMAX) 250 mg tablet    benzonatate (TESSALON PERLES) 100 mg capsule    predniSONE 10 mg tablet    albuterol (ProAir HFA) 90 mcg/act inhaler   3  Encounter for laboratory testing for COVID-19 virus  azithromycin (ZITHROMAX) 250 mg tablet    benzonatate (TESSALON PERLES) 100 mg capsule    predniSONE 10 mg tablet    albuterol (ProAir HFA) 90 mcg/act inhaler         Patient Instructions       Follow up with PCP in 3-5 days  Proceed to  ER if symptoms worsen  Chief Complaint     Chief Complaint   Patient presents with   • Rib Pain     Pt states she's had increasing left side rib pain accompanied by worsening cough x 10d-on and off fever-denies sob/dizzy/chest pain-says it hurts to cough         History of Present Illness       Pt with cough x 2-3 weeks  Some production   No sob , + chest pain with coughing       Review of Systems   Review of Systems   Constitutional: Negative  HENT: Negative  Eyes: Negative  Respiratory: Negative  Cardiovascular: Negative  Gastrointestinal: Negative  Endocrine: Negative  Genitourinary: Negative  Musculoskeletal: Negative  Skin: Negative  Allergic/Immunologic: Negative  Neurological: Negative  Hematological: Negative  Psychiatric/Behavioral: Negative  All other systems reviewed and are negative          Current Medications       Current Outpatient Medications:   •  albuterol (ProAir HFA) 90 mcg/act inhaler, Inhale 2 puffs every 6 (six) hours as needed for wheezing, Disp: 8 5 g, Rfl: 0  •  azithromycin (ZITHROMAX) 250 mg tablet, Take 2 tablets today then 1 tablet daily x 4 days, Disp: 6 tablet, Rfl: 0  •  benzonatate (TESSALON PERLES) 100 mg capsule, Take 1 capsule (100 mg total) by mouth 3 (three) times a day as needed for cough, Disp: 20 capsule, Rfl: 0  •  hydrochlorothiazide (HYDRODIURIL) 12 5 mg tablet, TAKE 1 TABLET BY MOUTH EVERY DAY, Disp: 90 tablet, Rfl: 2  •  losartan (COZAAR) 50 mg tablet, TAKE 1 TABLET BY MOUTH EVERY DAY, Disp: 30 tablet, Rfl: 5  •  predniSONE 10 mg tablet, 4 tabs po qd x 2 days then 3 tabs po qd x 2 days then 2 tabs po qd x 2 days then 1 tab po qd x 2 days, Disp: 20 tablet, Rfl: 0    Current Allergies     Allergies as of 10/25/2022   • (No Known Allergies)            The following portions of the patient's history were reviewed and updated as appropriate: allergies, current medications, past family history, past medical history, past social history, past surgical history and problem list      Past Medical History:   Diagnosis Date   • Acute cystitis with hematuria 2020   • COVID    • Fracture of distal end of radius    • Herniated nucleus pulposus, L5-S1    • Hyperlipidemia    • Laryngitis 2019   • Malignant melanoma of skin ([facility] [address] )     Resolved: 2017   • Migraine    • Oropharyngeal dysphagia 2019   • Papanicolaou smear for cervical cancer screening    • Plantar fasciitis     Last Assessed: 2016       Past Surgical History:   Procedure Laterality Date   •  SECTION     • HYSTEROSCOPY     • HYSTEROSCOPY W/ POLYPECTOMY     • MAMMO (HISTORICAL) Bilateral     GV   • SKIN CANCER EXCISION Left     leg, melanoma in situ   • TONSILLECTOMY         Family History   Problem Relation Age of Onset   • Hypertension Mother    • Pulmonary embolism Mother    • Uterine cancer Mother    • Heart attack Father         age 43   • Dementia Father    • Coronary artery disease Father    • Hypertension Sister    • No Known Problems Brother    • Hypertension Brother    • Atrial fibrillation Brother    • No Known Problems Daughter    • Cancer Family    • Lymphoma Family         Precursor B-cell Lymphoblastic of the bone   • Melanoma Other          Medications have been verified  Objective   /69   Pulse 82   Temp 97 8 °F (36 6 °C)   Resp 18   Ht 5' 6" (1 676 m)   Wt 92 4 kg (203 lb 12 8 oz)   SpO2 96%   BMI 32 89 kg/m²        Physical Exam     Physical Exam  Vitals and nursing note reviewed  Constitutional:       Appearance: Normal appearance  She is normal weight  Comments: Pt coughing so much felt a pull in left ribs    HENT:      Head: Normocephalic and atraumatic  Right Ear: Tympanic membrane, ear canal and external ear normal       Left Ear: Tympanic membrane, ear canal and external ear normal       Nose: Nose normal       Mouth/Throat:      Mouth: Mucous membranes are moist       Pharynx: Oropharynx is clear  Eyes:      Extraocular Movements: Extraocular movements intact  Conjunctiva/sclera: Conjunctivae normal       Pupils: Pupils are equal, round, and reactive to light  Cardiovascular:      Rate and Rhythm: Normal rate and regular rhythm  Pulses: Normal pulses  Heart sounds: Normal heart sounds  Pulmonary:      Effort: Pulmonary effort is normal       Breath sounds: Normal breath sounds  Abdominal:      General: Abdomen is flat  Bowel sounds are normal       Palpations: Abdomen is soft  Musculoskeletal:         General: Normal range of motion  Cervical back: Normal range of motion and neck supple  Comments: Left middle rib pain mid axillary line    Skin:     General: Skin is warm  Capillary Refill: Capillary refill takes less than 2 seconds  Neurological:      General: No focal deficit present        Mental Status: She is alert and oriented to person, place, and time     Psychiatric:         Mood and Affect: Mood normal          Behavior: Behavior normal

## 2022-12-02 ENCOUNTER — TELEPHONE (OUTPATIENT)
Dept: FAMILY MEDICINE CLINIC | Facility: CLINIC | Age: 62
End: 2022-12-02

## 2022-12-02 LAB
ALBUMIN SERPL-MCNC: 4.8 G/DL (ref 3.8–4.8)
ALBUMIN/GLOB SERPL: 2 {RATIO} (ref 1.2–2.2)
ALP SERPL-CCNC: 92 IU/L (ref 44–121)
ALT SERPL-CCNC: 16 IU/L (ref 0–32)
AST SERPL-CCNC: 16 IU/L (ref 0–40)
BASOPHILS # BLD AUTO: 0 X10E3/UL (ref 0–0.2)
BASOPHILS NFR BLD AUTO: 1 %
BILIRUB SERPL-MCNC: 0.6 MG/DL (ref 0–1.2)
BUN SERPL-MCNC: 19 MG/DL (ref 8–27)
BUN/CREAT SERPL: 21 (ref 12–28)
CALCIUM SERPL-MCNC: 9.9 MG/DL (ref 8.7–10.3)
CHLORIDE SERPL-SCNC: 97 MMOL/L (ref 96–106)
CHOLEST SERPL-MCNC: 274 MG/DL (ref 100–199)
CHOLEST/HDLC SERPL: 3.2 RATIO (ref 0–4.4)
CO2 SERPL-SCNC: 30 MMOL/L (ref 20–29)
CREAT SERPL-MCNC: 0.89 MG/DL (ref 0.57–1)
EGFR: 74 ML/MIN/1.73
EOSINOPHIL # BLD AUTO: 0.1 X10E3/UL (ref 0–0.4)
EOSINOPHIL NFR BLD AUTO: 1 %
ERYTHROCYTE [DISTWIDTH] IN BLOOD BY AUTOMATED COUNT: 12.4 % (ref 11.7–15.4)
GLOBULIN SER-MCNC: 2.4 G/DL (ref 1.5–4.5)
GLUCOSE SERPL-MCNC: 94 MG/DL (ref 70–99)
HCT VFR BLD AUTO: 41.3 % (ref 34–46.6)
HDLC SERPL-MCNC: 86 MG/DL
HGB BLD-MCNC: 14.2 G/DL (ref 11.1–15.9)
IMM GRANULOCYTES # BLD: 0 X10E3/UL (ref 0–0.1)
IMM GRANULOCYTES NFR BLD: 0 %
LDLC SERPL CALC-MCNC: 176 MG/DL (ref 0–99)
LYMPHOCYTES # BLD AUTO: 1.9 X10E3/UL (ref 0.7–3.1)
LYMPHOCYTES NFR BLD AUTO: 30 %
MCH RBC QN AUTO: 30.6 PG (ref 26.6–33)
MCHC RBC AUTO-ENTMCNC: 34.4 G/DL (ref 31.5–35.7)
MCV RBC AUTO: 89 FL (ref 79–97)
MONOCYTES # BLD AUTO: 0.4 X10E3/UL (ref 0.1–0.9)
MONOCYTES NFR BLD AUTO: 7 %
NEUTROPHILS # BLD AUTO: 3.9 X10E3/UL (ref 1.4–7)
NEUTROPHILS NFR BLD AUTO: 61 %
PLATELET # BLD AUTO: 271 X10E3/UL (ref 150–450)
POTASSIUM SERPL-SCNC: 4.3 MMOL/L (ref 3.5–5.2)
PROT SERPL-MCNC: 7.2 G/DL (ref 6–8.5)
RBC # BLD AUTO: 4.64 X10E6/UL (ref 3.77–5.28)
SL AMB VLDL CHOLESTEROL CALC: 12 MG/DL (ref 5–40)
SODIUM SERPL-SCNC: 140 MMOL/L (ref 134–144)
TRIGL SERPL-MCNC: 75 MG/DL (ref 0–149)
TSH SERPL DL<=0.005 MIU/L-ACNC: 1.03 UIU/ML (ref 0.45–4.5)
WBC # BLD AUTO: 6.3 X10E3/UL (ref 3.4–10.8)

## 2022-12-02 NOTE — TELEPHONE ENCOUNTER
----- Message from Jovan Rosado DO sent at 12/2/2022  8:07 AM EST -----  Your cholesterol is very high, much higher than last year     All other labs are normal

## 2022-12-06 ENCOUNTER — ANNUAL EXAM (OUTPATIENT)
Dept: OBGYN CLINIC | Facility: CLINIC | Age: 62
End: 2022-12-06

## 2022-12-06 VITALS
WEIGHT: 200 LBS | BODY MASS INDEX: 32.14 KG/M2 | DIASTOLIC BLOOD PRESSURE: 60 MMHG | HEIGHT: 66 IN | SYSTOLIC BLOOD PRESSURE: 100 MMHG

## 2022-12-06 DIAGNOSIS — R92.2 DENSE BREASTS: ICD-10-CM

## 2022-12-06 DIAGNOSIS — Z12.31 ENCOUNTER FOR SCREENING MAMMOGRAM FOR MALIGNANT NEOPLASM OF BREAST: ICD-10-CM

## 2022-12-06 DIAGNOSIS — Z01.419 ENCOUNTER FOR GYNECOLOGICAL EXAMINATION WITHOUT ABNORMAL FINDING: Primary | ICD-10-CM

## 2022-12-06 DIAGNOSIS — Z78.0 POSTMENOPAUSAL: ICD-10-CM

## 2022-12-06 DIAGNOSIS — E66.9 OBESITY (BMI 30-39.9): ICD-10-CM

## 2022-12-06 DIAGNOSIS — D25.2 FIBROIDS, SUBSEROUS: ICD-10-CM

## 2022-12-06 DIAGNOSIS — Z86.39 HISTORY OF SUBACUTE THYROIDITIS: ICD-10-CM

## 2022-12-06 DIAGNOSIS — Z12.4 SCREENING FOR CERVICAL CANCER: ICD-10-CM

## 2022-12-06 DIAGNOSIS — E04.1 LEFT THYROID NODULE: ICD-10-CM

## 2022-12-13 LAB
CYTOLOGIST CVX/VAG CYTO: NORMAL
DX ICD CODE: NORMAL
HPV GENOTYPE REFLEX: NORMAL
HPV I/H RISK 4 DNA CVX QL PROBE+SIG AMP: NEGATIVE
OTHER STN SPEC: NORMAL
PATH REPORT.FINAL DX SPEC: NORMAL
SL AMB NOTE:: NORMAL
SL AMB SPECIMEN ADEQUACY: NORMAL
SL AMB TEST METHODOLOGY: NORMAL

## 2022-12-15 ENCOUNTER — HOSPITAL ENCOUNTER (OUTPATIENT)
Dept: ULTRASOUND IMAGING | Facility: HOSPITAL | Age: 62
End: 2022-12-15
Attending: INTERNAL MEDICINE

## 2022-12-15 DIAGNOSIS — E04.1 LEFT THYROID NODULE: ICD-10-CM

## 2022-12-15 DIAGNOSIS — E04.2 GOITER, NONTOXIC, MULTINODULAR: ICD-10-CM

## 2022-12-15 DIAGNOSIS — I10 ESSENTIAL HYPERTENSION: ICD-10-CM

## 2022-12-15 RX ORDER — HYDROCHLOROTHIAZIDE 12.5 MG/1
TABLET ORAL
Qty: 30 TABLET | Refills: 8 | Status: SHIPPED | OUTPATIENT
Start: 2022-12-15

## 2022-12-19 ENCOUNTER — OFFICE VISIT (OUTPATIENT)
Dept: ENDOCRINOLOGY | Facility: HOSPITAL | Age: 62
End: 2022-12-19

## 2022-12-19 VITALS
SYSTOLIC BLOOD PRESSURE: 130 MMHG | WEIGHT: 204 LBS | BODY MASS INDEX: 32.78 KG/M2 | HEIGHT: 66 IN | HEART RATE: 65 BPM | DIASTOLIC BLOOD PRESSURE: 72 MMHG

## 2022-12-19 DIAGNOSIS — E04.1 LEFT THYROID NODULE: ICD-10-CM

## 2022-12-19 DIAGNOSIS — E04.2 GOITER, NONTOXIC, MULTINODULAR: Primary | ICD-10-CM

## 2022-12-19 NOTE — PATIENT INSTRUCTIONS
The thyroid blood work is normal     The thyroid ultrasound reading is still pending  We'll call you with the results  Follow up in 2 years with blood work and thyroid  ultrasound

## 2022-12-19 NOTE — PROGRESS NOTES
12/19/2022    Assessment/Plan      Diagnoses and all orders for this visit:    Goiter, nontoxic, multinodular  -     TSH, 3rd generation Lab Collect; Future  -     T4, free Lab Collect; Future  -     US thyroid; Future  -     TSH, 3rd generation Lab Collect  -     T4, free Lab Collect    Left thyroid nodule  -     TSH, 3rd generation Lab Collect; Future  -     T4, free Lab Collect; Future  -     US thyroid; Future  -     TSH, 3rd generation Lab Collect  -     T4, free Lab Collect        Assessment/Plan:  1  Nontoxic multinodular goiter  Most recent thyroid function studies are normal   She is biochemically euthyroid  2   Left-sided dominant thyroid nodule  Is post biopsy of this left nodule that was benign in the past   Thyroid ultrasound is still pending  We will call her when the results are in  If everything is stable, then I will have her follow-up in 2 years with preceding TSH, free T4, and thyroid ultrasound  CC:  Thyroid nodule/goiter follow-up    History of Present Illness     HPI: Ravinder Narvaez is a 65y o  year old female with history of nontoxic multinodular goiter with history of dominant left-sided thyroid nodule, biopsy benign for follow-up visit  She had originally felt in 2011 as if there was something in her throat  This felt as if it was on the right side but an ear nose and throat physician could not locate anything  She was eventually found to have a nontoxic multinodular goiter  There was a dominant left-sided thyroid nodule  This was biopsied via fine-needle aspiration in 2011 and the pathology was benign  She was seen in August 2019 and has been on visits every 2 years  She was seen in March 2020 with some compressive symptoms but no large nodules  Her issues were thought to be due to allergies  She is currently on no thyroid medications  She has a rare heart throat flutter noted in her throat    She denies insomnia, fatigue, weight changes, heat or cold intolerance  She will get a slight tremor when holding the phone  She has some dry skin of the hands and some hair loss but no brittle nails  She denies anxiety or depression, diarrhea or constipation  She denies diplopia  She denies compressive thyroid symptoms or difficulties with swallowing  Review of Systems   Constitutional: Negative for fatigue and unexpected weight change  HENT: Negative for trouble swallowing  Eyes: Negative for visual disturbance  Wears glasses  No diplopia  Respiratory: Negative for chest tightness and shortness of breath  Cardiovascular: Positive for palpitations  Negative for chest pain  Rare heart flutter to throat  Gastrointestinal: Negative for abdominal pain, constipation, diarrhea and nausea  Endocrine: Negative for cold intolerance and heat intolerance  Skin: Negative for rash  Some dry skin of the hands  No brittle nails  Some hair loss  Neurological: Positive for tremors  Negative for dizziness, light-headedness and headaches  Slight tremor with holding her phone  Psychiatric/Behavioral: Negative for dysphoric mood and sleep disturbance  The patient is not nervous/anxious  Gets 5-6 hours of sleep due to choices          Historical Information   Past Medical History:   Diagnosis Date   • Acute cystitis with hematuria 2020   • COVID 2020   • Fracture of distal end of radius    • Herniated nucleus pulposus, L5-S1    • Hyperlipidemia    • Laryngitis 2019   • Malignant melanoma of skin ([facility] [address] )     Resolved: 2017   • Migraine    • Oropharyngeal dysphagia 2019   • Papanicolaou smear for cervical cancer screening    • Plantar fasciitis     Last Assessed: 2016     Past Surgical History:   Procedure Laterality Date   •  SECTION     • HYSTEROSCOPY     • HYSTEROSCOPY W/ POLYPECTOMY     • MAMMO (HISTORICAL) Bilateral 2021    [facility]  birads  1  50-75% d   • SKIN CANCER EXCISION Left     leg, melanoma in situ   • TONSILLECTOMY       Social History   Social History     Substance and Sexual Activity   Alcohol Use Yes    Comment: social drinker per [employer]     Social History     Substance and Sexual Activity   Drug Use No     Social History     Tobacco Use   Smoking Status Never   Smokeless Tobacco Never     Family History:   Family History   Problem Relation Age of Onset   • Hypertension Mother    • Pulmonary embolism Mother    • Uterine cancer Mother    • Irregular heart beat Mother         has pacemaker   • Heart attack Father         age 43   • Dementia Father    • Coronary artery disease Father    • Hypertension Sister    • Atrial fibrillation Brother    • Hypertension Brother    • Atrial fibrillation Brother    • Lymphoma Family         Precursor B-cell Lymphoblastic of the bone   • Cancer Family    • No Known Problems Daughter    • Melanoma Other    • Breast cancer Neg Hx    • Ovarian cancer Neg Hx    • Colon cancer Neg Hx        Meds/Allergies   Current Outpatient Medications   Medication Sig Dispense Refill   • hydrochlorothiazide (HYDRODIURIL) 12 5 mg tablet TAKE 1 TABLET BY MOUTH EVERY DAY 30 tablet 8   • losartan (COZAAR) 50 mg tablet TAKE 1 TABLET BY MOUTH EVERY DAY 90 tablet 3     No current facility-administered medications for this visit  No Known Allergies    Objective   Vitals: Blood pressure 130/72, pulse 65, height 5' 6" (1 676 m), weight 92 5 kg (204 lb)  Invasive Devices     None                 Physical Exam  Vitals reviewed  Constitutional:       Appearance: Normal appearance  She is well-developed  HENT:      Head: Normocephalic and atraumatic  Eyes:      Extraocular Movements: Extraocular movements intact  Conjunctiva/sclera: Conjunctivae normal       Comments: No lid lag, stare, proptosis, or periorbital edema  Neck:      Thyroid: No thyromegaly  Vascular: No carotid bruit  Comments: Thyroid irregular and nodular and feel    No discrete nodules palpable  Cardiovascular:      Rate and Rhythm: Normal rate and regular rhythm  Heart sounds: Normal heart sounds  No murmur heard  Pulmonary:      Effort: Pulmonary effort is normal       Breath sounds: Normal breath sounds  No wheezing  Abdominal:      Palpations: Abdomen is soft  Musculoskeletal:         General: No deformity  Normal range of motion  Cervical back: Normal range of motion and neck supple  Right lower leg: No edema  Left lower leg: No edema  Comments: No tremor of the outstretched hands  Lymphadenopathy:      Cervical: No cervical adenopathy  Skin:     General: Skin is warm and dry  Findings: No rash  Neurological:      Mental Status: She is alert and oriented to person, place, and time  Deep Tendon Reflexes: Reflexes are normal and symmetric  Comments: Patellar deep tendon reflexes normal          The history was obtained from the review of the chart and from the patient  Lab Results:          Lab Results   Component Value Date    CREATININE 0 89 12/01/2022    CREATININE 0 94 08/11/2021    CREATININE 0 81 11/06/2020    BUN 19 12/01/2022    K 4 3 12/01/2022    CL 97 12/01/2022    CO2 30 (H) 12/01/2022     eGFR   Date Value Ref Range Status   12/01/2022 74 >59 mL/min/1 73 Final   09/22/2016 >60 0 ml/min/1 73sq m Final         Lab Results   Component Value Date    CHOL 219 (H) 06/17/2015    HDL 86 12/01/2022    TRIG 75 12/01/2022    CHOLHDL 3 2 12/01/2022       Lab Results   Component Value Date    ALT 16 12/01/2022    AST 16 12/01/2022    ALKPHOS 73 01/23/2018       Lab Results   Component Value Date    TSH 1 030 12/01/2022    FREET4 1 13 08/20/2019     Thyroid ultrasound:    Thyroid ultrasound was still pending as of this visit  No future appointments

## 2022-12-28 DIAGNOSIS — Z12.31 ENCOUNTER FOR SCREENING MAMMOGRAM FOR BREAST CANCER: ICD-10-CM

## 2023-02-04 PROBLEM — Z12.4 SCREENING FOR CERVICAL CANCER: Status: RESOLVED | Noted: 2022-12-06 | Resolved: 2023-02-04

## 2023-05-17 ENCOUNTER — TELEPHONE (OUTPATIENT)
Dept: FAMILY MEDICINE CLINIC | Facility: CLINIC | Age: 63
End: 2023-05-17

## 2023-05-17 DIAGNOSIS — Z00.00 ROUTINE CHECK-UP: Primary | ICD-10-CM

## 2023-05-17 DIAGNOSIS — L98.9 DERMATOLOGIC PROBLEM: ICD-10-CM

## 2023-05-17 NOTE — TELEPHONE ENCOUNTER
Pt requesting dermatology referral for her yearly check up body scan and needs referral for Dr Everett Velez MD dermatology by Monday 5/22/23 for appt with [phone] dx code [phone]  Pt has [facility] [address]

## 2023-08-22 ENCOUNTER — TELEPHONE (OUTPATIENT)
Dept: FAMILY MEDICINE CLINIC | Facility: CLINIC | Age: 63
End: 2023-08-22

## 2023-08-22 NOTE — TELEPHONE ENCOUNTER
Confirmation Armand Gaspar [id number]  Effective: 08/22/2023     Expires: 11/20/2023  Active  Referred From  [address]  Group VXP: [id number]  Provider HM: [id number]  Tax EF: [id number]  [address]  [id number], [id number]  Referred To  Violet Comer MD  Specialty: Not Available  Tier 1  Group JNN: [id number]  Provider NX: [id number]  Tax WF: [id number]  This referral is valid at any location for the above group  Patient Info  Starr Thurman  [id number]  Female  1960  [address] [place] [address]  Office  Service Type  Medical Care  Diagnoses  1 H10.35 - other general symptoms and signs  Procedures  1 69904 - unlisted evaluation and management service

## 2023-09-20 DIAGNOSIS — I10 ESSENTIAL HYPERTENSION: ICD-10-CM

## 2023-09-20 RX ORDER — HYDROCHLOROTHIAZIDE 12.5 MG/1
TABLET ORAL
Qty: 30 TABLET | Refills: 0 | Status: SHIPPED | OUTPATIENT
Start: 2023-09-20

## 2023-09-20 NOTE — TELEPHONE ENCOUNTER
Called Pt.  Spoke to Pt, and Pt advised  hydrochlorothiazide (HYDRODIURIL) 12.5 mg tablet  Sent [facility]/pharmacy #[id number]- [place] - [address]   Due for physical after 10/11, sent 1 month prescription     Patient schedule phy for 10/12

## 2023-10-12 ENCOUNTER — OFFICE VISIT (OUTPATIENT)
Dept: FAMILY MEDICINE CLINIC | Facility: CLINIC | Age: 63
End: 2023-10-12
Payer: COMMERCIAL

## 2023-10-12 VITALS
HEIGHT: 66 IN | HEART RATE: 70 BPM | OXYGEN SATURATION: 97 % | BODY MASS INDEX: 31.82 KG/M2 | TEMPERATURE: 96.2 F | SYSTOLIC BLOOD PRESSURE: 116 MMHG | WEIGHT: 198 LBS | DIASTOLIC BLOOD PRESSURE: 78 MMHG

## 2023-10-12 DIAGNOSIS — I10 ESSENTIAL HYPERTENSION: ICD-10-CM

## 2023-10-12 DIAGNOSIS — E78.2 MIXED HYPERLIPIDEMIA: ICD-10-CM

## 2023-10-12 DIAGNOSIS — Z78.0 POSTMENOPAUSE: ICD-10-CM

## 2023-10-12 DIAGNOSIS — Z13.220 SCREENING FOR LIPID DISORDERS: ICD-10-CM

## 2023-10-12 DIAGNOSIS — Z13.0 SCREENING FOR DEFICIENCY ANEMIA: ICD-10-CM

## 2023-10-12 DIAGNOSIS — Z12.11 ENCOUNTER FOR SCREENING FOR MALIGNANT NEOPLASM OF COLON: ICD-10-CM

## 2023-10-12 DIAGNOSIS — Z13.0 SCREENING FOR ENDOCRINE, METABOLIC AND IMMUNITY DISORDER: ICD-10-CM

## 2023-10-12 DIAGNOSIS — Z12.31 ENCOUNTER FOR SCREENING MAMMOGRAM FOR BREAST CANCER: ICD-10-CM

## 2023-10-12 DIAGNOSIS — E66.09 CLASS 1 OBESITY DUE TO EXCESS CALORIES WITH SERIOUS COMORBIDITY AND BODY MASS INDEX (BMI) OF 31.0 TO 31.9 IN ADULT: ICD-10-CM

## 2023-10-12 DIAGNOSIS — I10 ESSENTIAL HYPERTENSION: Primary | ICD-10-CM

## 2023-10-12 DIAGNOSIS — R00.2 PALPITATION: ICD-10-CM

## 2023-10-12 DIAGNOSIS — Z13.1 SCREENING FOR DIABETES MELLITUS (DM): ICD-10-CM

## 2023-10-12 DIAGNOSIS — Z13.29 SCREENING FOR ENDOCRINE, METABOLIC AND IMMUNITY DISORDER: ICD-10-CM

## 2023-10-12 DIAGNOSIS — Z13.228 SCREENING FOR ENDOCRINE, METABOLIC AND IMMUNITY DISORDER: ICD-10-CM

## 2023-10-12 DIAGNOSIS — Z12.12 ENCOUNTER FOR SCREENING FOR MALIGNANT NEOPLASM OF RECTUM: ICD-10-CM

## 2023-10-12 PROBLEM — L03.011 PARONYCHIA OF RIGHT MIDDLE FINGER: Status: RESOLVED | Noted: 2021-12-10 | Resolved: 2023-10-12

## 2023-10-12 PROCEDURE — 3725F SCREEN DEPRESSION PERFORMED: CPT | Performed by: FAMILY MEDICINE

## 2023-10-12 PROCEDURE — 99214 OFFICE O/P EST MOD 30 MIN: CPT | Performed by: FAMILY MEDICINE

## 2023-10-12 RX ORDER — LOSARTAN POTASSIUM 50 MG/1
50 TABLET ORAL DAILY
Qty: 90 TABLET | Refills: 3 | Status: SHIPPED | OUTPATIENT
Start: 2023-10-12

## 2023-10-12 RX ORDER — HYDROCHLOROTHIAZIDE 12.5 MG/1
12.5 TABLET ORAL DAILY
Qty: 30 TABLET | Refills: 11 | Status: SHIPPED | OUTPATIENT
Start: 2023-10-12

## 2023-10-12 NOTE — PROGRESS NOTES
[address]    NAME: Luz Strickland  AGE: 59 y.o. SEX: female  : 1960     DATE: 10/12/2023     Assessment and Plan:     Problem List Items Addressed This Visit    None      Immunizations and preventive care screenings were discussed with patient today. Appropriate education was printed on patient's after visit summary. Counseling:  {Annual Physical; Counselin}      Depression Screening and Follow-up Plan: Patient was screened for depression during today's encounter. They screened negative with a PHQ-2 score of 0. No follow-ups on file. Chief Complaint:     Chief Complaint   Patient presents with    Follow-up     Medication issues, eight leg bump has review before       History of Present Illness:     Adult Annual Physical   Patient here for a comprehensive physical exam. The patient reports {problems:27661}. Diet and Physical Activity  Diet/Nutrition: {annual physical; diet:16028172}. Exercise: {annual physical; exercise:53930859}. Depression Screening  PHQ-2/9 Depression Screening    Little interest or pleasure in doing things: 0 - not at all  Feeling down, depressed, or hopeless: 0 - not at all  PHQ-2 Score: 0  PHQ-2 Interpretation: Negative depression screen       General Health  Sleep: {annual physical; sleep:2102}. Hearing: {annual physical; hearin}. Vision: {annual physical; vision:}. Dental: {annual physical; dental:}. /GYN Health  Patient is: {Menopause:93847}  Last menstrual period: ***  Contraceptive method: {contraceptive options:07136}. Advanced Care Planning  Do you have an advanced directive? {YES/NO:}  Do you have a durable medical power of ?  {YES/NO:}     Review of Systems:     Review of Systems   Past Medical History:     Past Medical History:   Diagnosis Date    Acute cystitis with hematuria 2020    COVID 2020    Fracture of distal end of radius     Herniated nucleus pulposus, L5-S1     Hyperlipidemia     Laryngitis 2019    Malignant melanoma of skin ([address])     Resolved: 2017    Migraine     Oropharyngeal dysphagia 2019    Papanicolaou smear for cervical cancer screening     Plantar fasciitis     Last Assessed: 2016      Past Surgical History:     Past Surgical History:   Procedure Laterality Date     SECTION      HYSTEROSCOPY      HYSTEROSCOPY W/ POLYPECTOMY      MAMMO (HISTORICAL) Bilateral 2021    [facility]  b[facility]ads  1  50-75% d    SKIN CANCER EXCISION Left     leg, melanoma in situ    TONSILLECTOMY        Social History:     Social History     Socioeconomic History    Marital status: /Civil Union     Spouse name: None    Number of children: None    Years of education: None    Highest education level: None   Occupational History    Occupation:    Tobacco Use    Smoking status: Never    Smokeless tobacco: Never   Vaping Use    Vaping Use: Never used   Substance and Sexual Activity    Alcohol use: Yes     Comment: social drinker per [employer]    Drug use: No    Sexual activity: Yes     Partners: Male     Birth control/protection: Post-menopausal   Other Topics Concern    None   Social History Narrative    Do you smoke marijuana?  No    Domestic violence: No    Pets: Cats,Dogs    currently sexually active, no new partner in last year     Social Determinants of Health     Financial Resource Strain: Not on file   Food Insecurity: Not on file   Transportation Needs: Not on file   Physical Activity: Not on file   Stress: Not on file   Social Connections: Not on file   Intimate Partner Violence: Not on file   Housing Stability: Not on file      Family History:     Family History   Problem Relation Age of Onset    Hypertension Mother     Pulmonary embolism Mother     Uterine cancer Mother     Irregular heart beat Mother         has pacemaker    Heart attack Father         age 43    Dementia Father     Coronary artery disease Father     Hypertension Sister     Atrial fibrillation Brother     Hypertension Brother     Atrial fibrillation Brother     Lymphoma Family         Precursor B-cell Lymphoblastic of the bone    Cancer Family     No Known Problems Daughter     Melanoma Other     Breast cancer Neg Hx     Ovarian cancer Neg Hx     Colon cancer Neg Hx       Current Medications:     Current Outpatient Medications   Medication Sig Dispense Refill    hydrochlorothiazide (HYDRODIURIL) 12.5 mg tablet TAKE 1 TABLET BY MOUTH EVERY DAY 30 tablet 0    losartan (COZAAR) 50 mg tablet TAKE 1 TABLET BY MOUTH EVERY DAY 90 tablet 3     No current facility-administered medications for this visit.       Allergies:     No Known Allergies   Physical Exam:     /78   Pulse 70   Temp (!) 96.2 °F (35.7 °C) (Tympanic)   Ht 5' 6" (1.676 m)   Wt 89.8 kg (198 lb)   SpO2 97%   BMI 31.96 kg/m²     Physical Exam     Karla Lyle DO  [facility]

## 2023-10-29 PROBLEM — E66.9 OBESITY (BMI 30-39.9): Status: RESOLVED | Noted: 2022-12-06 | Resolved: 2023-10-29

## 2023-10-30 NOTE — PROGRESS NOTES
Assessment/Plan:      1. Essential hypertension  Assessment & Plan:  Controlled on current medication    Orders:  -     CBC and differential; Future  -     Comprehensive metabolic panel; Future  -     Lipid Panel with Direct LDL reflex  -     TSH, 3rd generation with Free T4 reflex; Future  -     hydrochlorothiazide (HYDRODIURIL) 12.5 mg tablet; Take 1 tablet (12.5 mg total) by mouth daily  -     losartan (COZAAR) 50 mg tablet; Take 1 tablet (50 mg total) by mouth daily  -     CBC and differential  -     Comprehensive metabolic panel  -     TSH, 3rd generation with Free T4 reflex    2. Mixed hyperlipidemia  -     Lipid Panel with Direct LDL reflex    3. Palpitation  -     CBC and differential; Future  -     Comprehensive metabolic panel; Future  -     TSH, 3rd generation with Free T4 reflex; Future  -     CBC and differential  -     Comprehensive metabolic panel  -     TSH, 3rd generation with Free T4 reflex    4. Screening for deficiency anemia  -     CBC and differential; Future  -     CBC and differential    5. Screening for diabetes mellitus (DM)  -     Comprehensive metabolic panel; Future  -     Comprehensive metabolic panel    6. Screening for endocrine, metabolic and immunity disorder  -     Comprehensive metabolic panel; Future  -     TSH, 3rd generation with Free T4 reflex; Future  -     Comprehensive metabolic panel  -     TSH, 3rd generation with Free T4 reflex    7. Screening for lipid disorders  -     Lipid Panel with Direct LDL reflex    8. Encounter for screening mammogram for breast cancer  -     Mammo screening bilateral w 3d & cad; Future; Expected date: 12/08/2023    9. Postmenopause  -     DXA bone density spine hip and pelvis; Future; Expected date: 10/12/2023    10. Essential hypertension  Comments:  Controlled, continue current medication  Assessment & Plan:  Controlled on current medication    Orders:  -     CBC and differential; Future  -     Comprehensive metabolic panel;  Future  - Lipid Panel with Direct LDL reflex  -     TSH, 3rd generation with Free T4 reflex; Future  -     hydrochlorothiazide (HYDRODIURIL) 12.5 mg tablet; Take 1 tablet (12.5 mg total) by mouth daily  -     losartan (COZAAR) 50 mg tablet; Take 1 tablet (50 mg total) by mouth daily  -     CBC and differential  -     Comprehensive metabolic panel  -     TSH, 3rd generation with Free T4 reflex    11. Encounter for screening for malignant neoplasm of colon  -     Cologuard    12. Encounter for screening for malignant neoplasm of rectum  -     Cologuard    13. Class 1 obesity due to excess calories with serious comorbidity and body mass index (BMI) of 31.0 to 31.9 in adult          Subjective:  Chief Complaint   Patient presents with    Follow-up     Medication issues, eight leg bump has review before         Patient ID: Nadine Bunch is a 59 y.o. female. Pt is seen for follow up on blood pressure. Denies chest pain or shortness of breath. No recent illnesses. Noticed bumps on legs. Review of Systems   Constitutional: Negative. Negative for fatigue and fever. HENT: Negative. Eyes: Negative. Respiratory: Negative. Negative for cough. Cardiovascular: Negative. Gastrointestinal: Negative. Endocrine: Negative. Genitourinary: Negative. Musculoskeletal: Negative. Skin: Negative. Allergic/Immunologic: Negative. Neurological: Negative. Psychiatric/Behavioral: Negative. The following portions of the patient's history were reviewed and updated as appropriate: allergies, current medications, past family history, past medical history, past social history, past surgical history and problem list.    Objective:  Vitals:    10/12/23 1101   BP: 116/78   Pulse: 70   Temp: (!) 96.2 °F (35.7 °C)   TempSrc: Tympanic   SpO2: 97%   Weight: 89.8 kg (198 lb)   Height: 5' 6" (1.676 m)      Physical Exam  Vitals and nursing note reviewed.    Constitutional:       Appearance: She is well-developed. HENT:      Head: Normocephalic and atraumatic. Cardiovascular:      Rate and Rhythm: Normal rate and regular rhythm. Heart sounds: Normal heart sounds. Pulmonary:      Effort: Pulmonary effort is normal.      Breath sounds: Normal breath sounds. Abdominal:      General: Bowel sounds are normal.      Palpations: Abdomen is soft. Skin:     General: Skin is warm and dry. Comments: Areas with consistency of lipoma   Neurological:      Mental Status: She is alert and oriented to person, place, and time. Psychiatric:         Behavior: Behavior normal.         Thought Content:  Thought content normal.         Judgment: Judgment normal.

## 2024-01-30 ENCOUNTER — TELEPHONE (OUTPATIENT)
Dept: FAMILY MEDICINE CLINIC | Facility: CLINIC | Age: 64
End: 2024-01-30

## 2024-01-30 NOTE — TELEPHONE ENCOUNTER
Pt call in office starting she was positive of Covid, pt ask for recommendation, I give the recommendation from dr bonilla ,   Zinc 200mg  Vit D 2000iu  vitC 1000iu

## 2024-02-21 PROBLEM — Z01.419 ENCOUNTER FOR GYNECOLOGICAL EXAMINATION WITHOUT ABNORMAL FINDING: Status: RESOLVED | Noted: 2022-12-06 | Resolved: 2024-02-21

## 2024-02-21 PROBLEM — Z12.11 SCREENING FOR COLON CANCER: Status: RESOLVED | Noted: 2019-11-08 | Resolved: 2024-02-21

## 2024-02-22 ENCOUNTER — APPOINTMENT (EMERGENCY)
Dept: CT IMAGING | Facility: HOSPITAL | Age: 64
End: 2024-02-22
Payer: COMMERCIAL

## 2024-02-22 ENCOUNTER — HOSPITAL ENCOUNTER (EMERGENCY)
Facility: HOSPITAL | Age: 64
Discharge: HOME | End: 2024-02-22
Attending: EMERGENCY MEDICINE
Payer: COMMERCIAL

## 2024-02-22 VITALS
DIASTOLIC BLOOD PRESSURE: 58 MMHG | TEMPERATURE: 97.9 F | SYSTOLIC BLOOD PRESSURE: 109 MMHG | BODY MASS INDEX: 32.14 KG/M2 | OXYGEN SATURATION: 92 % | HEART RATE: 70 BPM | HEIGHT: 66 IN | RESPIRATION RATE: 24 BRPM | WEIGHT: 200 LBS

## 2024-02-22 DIAGNOSIS — R10.13 EPIGASTRIC PAIN: Primary | ICD-10-CM

## 2024-02-22 DIAGNOSIS — K52.9 ENTERITIS: ICD-10-CM

## 2024-02-22 LAB
ALBUMIN SERPL BCP-MCNC: 4.4 G/DL (ref 3.5–5)
ALP SERPL-CCNC: 65 U/L (ref 34–104)
ALT SERPL W P-5'-P-CCNC: 10 U/L (ref 7–52)
ANION GAP SERPL CALCULATED.3IONS-SCNC: 8 MMOL/L
AST SERPL W P-5'-P-CCNC: 12 U/L (ref 13–39)
ATRIAL RATE: 68 BPM
BACTERIA UR QL AUTO: ABNORMAL /HPF
BASOPHILS # BLD AUTO: 0.03 THOUSANDS/ÂΜL (ref 0–0.1)
BASOPHILS NFR BLD AUTO: 0 % (ref 0–1)
BILIRUB SERPL-MCNC: 0.71 MG/DL (ref 0.2–1)
BILIRUB UR QL STRIP: NEGATIVE
BUN SERPL-MCNC: 23 MG/DL (ref 5–25)
CALCIUM SERPL-MCNC: 9.8 MG/DL (ref 8.4–10.2)
CARDIAC TROPONIN I PNL SERPL HS: <2 NG/L
CHLORIDE SERPL-SCNC: 96 MMOL/L (ref 96–108)
CLARITY UR: CLEAR
CO2 SERPL-SCNC: 33 MMOL/L (ref 21–32)
COLOR UR: YELLOW
CREAT SERPL-MCNC: 0.93 MG/DL (ref 0.6–1.3)
EOSINOPHIL # BLD AUTO: 0.07 THOUSAND/ÂΜL (ref 0–0.61)
EOSINOPHIL NFR BLD AUTO: 1 % (ref 0–6)
ERYTHROCYTE [DISTWIDTH] IN BLOOD BY AUTOMATED COUNT: 12.8 % (ref 11.6–15.1)
GFR SERPL CREATININE-BSD FRML MDRD: 65 ML/MIN/1.73SQ M
GLUCOSE SERPL-MCNC: 138 MG/DL (ref 65–140)
GLUCOSE UR STRIP-MCNC: NEGATIVE MG/DL
HCT VFR BLD AUTO: 41.6 % (ref 34.8–46.1)
HGB BLD-MCNC: 13.5 G/DL (ref 11.5–15.4)
HGB UR QL STRIP.AUTO: NEGATIVE
IMM GRANULOCYTES # BLD AUTO: 0.07 THOUSAND/UL (ref 0–0.2)
IMM GRANULOCYTES NFR BLD AUTO: 1 % (ref 0–2)
KETONES UR STRIP-MCNC: NEGATIVE MG/DL
LEUKOCYTE ESTERASE UR QL STRIP: NEGATIVE
LIPASE SERPL-CCNC: 7 U/L (ref 11–82)
LYMPHOCYTES # BLD AUTO: 1.88 THOUSANDS/ÂΜL (ref 0.6–4.47)
LYMPHOCYTES NFR BLD AUTO: 16 % (ref 14–44)
MCH RBC QN AUTO: 29.2 PG (ref 26.8–34.3)
MCHC RBC AUTO-ENTMCNC: 32.5 G/DL (ref 31.4–37.4)
MCV RBC AUTO: 90 FL (ref 82–98)
MONOCYTES # BLD AUTO: 0.6 THOUSAND/ÂΜL (ref 0.17–1.22)
MONOCYTES NFR BLD AUTO: 5 % (ref 4–12)
NEUTROPHILS # BLD AUTO: 9.27 THOUSANDS/ÂΜL (ref 1.85–7.62)
NEUTS SEG NFR BLD AUTO: 77 % (ref 43–75)
NITRITE UR QL STRIP: NEGATIVE
NON-SQ EPI CELLS URNS QL MICRO: ABNORMAL /HPF
NRBC BLD AUTO-RTO: 0 /100 WBCS
P AXIS: 62 DEGREES
PH UR STRIP.AUTO: 7.5 [PH]
PLATELET # BLD AUTO: 256 THOUSANDS/UL (ref 149–390)
PMV BLD AUTO: 9.6 FL (ref 8.9–12.7)
POTASSIUM SERPL-SCNC: 3.3 MMOL/L (ref 3.5–5.3)
PR INTERVAL: 156 MS
PROT SERPL-MCNC: 7.4 G/DL (ref 6.4–8.4)
PROT UR STRIP-MCNC: ABNORMAL MG/DL
QRS AXIS: 55 DEGREES
QRSD INTERVAL: 78 MS
QT INTERVAL: 380 MS
QTC INTERVAL: 404 MS
RBC # BLD AUTO: 4.63 MILLION/UL (ref 3.81–5.12)
RBC #/AREA URNS AUTO: ABNORMAL /HPF
SODIUM SERPL-SCNC: 137 MMOL/L (ref 135–147)
SP GR UR STRIP.AUTO: 1.01 (ref 1–1.03)
T WAVE AXIS: 65 DEGREES
UROBILINOGEN UR STRIP-ACNC: <2 MG/DL
VENTRICULAR RATE: 68 BPM
WBC # BLD AUTO: 11.92 THOUSAND/UL (ref 4.31–10.16)
WBC #/AREA URNS AUTO: ABNORMAL /HPF

## 2024-02-22 PROCEDURE — 93005 ELECTROCARDIOGRAM TRACING: CPT

## 2024-02-22 PROCEDURE — 96374 THER/PROPH/DIAG INJ IV PUSH: CPT

## 2024-02-22 PROCEDURE — 83690 ASSAY OF LIPASE: CPT

## 2024-02-22 PROCEDURE — 36415 COLL VENOUS BLD VENIPUNCTURE: CPT

## 2024-02-22 PROCEDURE — 81001 URINALYSIS AUTO W/SCOPE: CPT

## 2024-02-22 PROCEDURE — 99284 EMERGENCY DEPT VISIT MOD MDM: CPT

## 2024-02-22 PROCEDURE — 93010 ELECTROCARDIOGRAM REPORT: CPT | Performed by: INTERNAL MEDICINE

## 2024-02-22 PROCEDURE — 80053 COMPREHEN METABOLIC PANEL: CPT

## 2024-02-22 PROCEDURE — 99285 EMERGENCY DEPT VISIT HI MDM: CPT | Performed by: EMERGENCY MEDICINE

## 2024-02-22 PROCEDURE — 96375 TX/PRO/DX INJ NEW DRUG ADDON: CPT

## 2024-02-22 PROCEDURE — 85025 COMPLETE CBC W/AUTO DIFF WBC: CPT

## 2024-02-22 PROCEDURE — 96361 HYDRATE IV INFUSION ADD-ON: CPT

## 2024-02-22 PROCEDURE — C9113 INJ PANTOPRAZOLE SODIUM, VIA: HCPCS | Performed by: EMERGENCY MEDICINE

## 2024-02-22 PROCEDURE — 84484 ASSAY OF TROPONIN QUANT: CPT

## 2024-02-22 PROCEDURE — 74177 CT ABD & PELVIS W/CONTRAST: CPT

## 2024-02-22 RX ORDER — KETOROLAC TROMETHAMINE 30 MG/ML
15 INJECTION, SOLUTION INTRAMUSCULAR; INTRAVENOUS ONCE
Status: COMPLETED | OUTPATIENT
Start: 2024-02-22 | End: 2024-02-22

## 2024-02-22 RX ORDER — ONDANSETRON 4 MG/1
4 TABLET, ORALLY DISINTEGRATING ORAL EVERY 6 HOURS PRN
Qty: 20 TABLET | Refills: 0 | Status: SHIPPED | OUTPATIENT
Start: 2024-02-22

## 2024-02-22 RX ORDER — PANTOPRAZOLE SODIUM 40 MG/10ML
40 INJECTION, POWDER, LYOPHILIZED, FOR SOLUTION INTRAVENOUS ONCE
Status: COMPLETED | OUTPATIENT
Start: 2024-02-22 | End: 2024-02-22

## 2024-02-22 RX ORDER — POTASSIUM CHLORIDE 20 MEQ/1
40 TABLET, EXTENDED RELEASE ORAL ONCE
Status: COMPLETED | OUTPATIENT
Start: 2024-02-22 | End: 2024-02-22

## 2024-02-22 RX ORDER — PANTOPRAZOLE SODIUM 20 MG/1
20 TABLET, DELAYED RELEASE ORAL DAILY
Qty: 20 TABLET | Refills: 0 | Status: SHIPPED | OUTPATIENT
Start: 2024-02-22

## 2024-02-22 RX ORDER — ONDANSETRON 2 MG/ML
4 INJECTION INTRAMUSCULAR; INTRAVENOUS ONCE
Status: COMPLETED | OUTPATIENT
Start: 2024-02-22 | End: 2024-02-22

## 2024-02-22 RX ADMIN — POTASSIUM CHLORIDE 40 MEQ: 1500 TABLET, EXTENDED RELEASE ORAL at 02:24

## 2024-02-22 RX ADMIN — IOHEXOL 100 ML: 350 INJECTION, SOLUTION INTRAVENOUS at 02:56

## 2024-02-22 RX ADMIN — KETOROLAC TROMETHAMINE 15 MG: 30 INJECTION, SOLUTION INTRAMUSCULAR; INTRAVENOUS at 02:08

## 2024-02-22 RX ADMIN — SODIUM CHLORIDE 1000 ML: 0.9 INJECTION, SOLUTION INTRAVENOUS at 02:10

## 2024-02-22 RX ADMIN — ONDANSETRON 4 MG: 2 INJECTION INTRAMUSCULAR; INTRAVENOUS at 02:07

## 2024-02-22 RX ADMIN — PANTOPRAZOLE SODIUM 40 MG: 40 INJECTION, POWDER, FOR SOLUTION INTRAVENOUS at 02:07

## 2024-02-22 NOTE — ED PROVIDER NOTES
"History  Chief Complaint   Patient presents with    Abdominal Pain     Epigastric abd pain starting at 2000 today described as \"contractions\", associated w nausea.      64-year-old female with history of hypertension presents for evaluation of epigastric crampy abdominal pain associated with nausea and 1 episode of vomiting while in the emergency department.  Pain started sometime after dinner, associated with abdominal cramping and feeling like she needs to go have a bowel movement however denies any diarrhea.  There is no hematemesis, no melena no hematochezia.  No history of small bowel obstructions.  Previous surgical history includes .  Has no chest pain, no shortness of breath        Prior to Admission Medications   Prescriptions Last Dose Informant Patient Reported? Taking?   hydrochlorothiazide (HYDRODIURIL) 12.5 mg tablet   No No   Sig: Take 1 tablet (12.5 mg total) by mouth daily   losartan (COZAAR) 50 mg tablet   No No   Sig: Take 1 tablet (50 mg total) by mouth daily      Facility-Administered Medications: None       Past Medical History:   Diagnosis Date    Acute cystitis with hematuria 2020    COVID 2020    Fracture of distal end of radius     Herniated nucleus pulposus, L5-S1     Hyperlipidemia     Laryngitis 2019    Malignant melanoma of skin (HCC)     Resolved: 2017    Migraine     Oropharyngeal dysphagia 2019    Papanicolaou smear for cervical cancer screening     Plantar fasciitis     Last Assessed: 2016       Past Surgical History:   Procedure Laterality Date     SECTION      HYSTEROSCOPY      HYSTEROSCOPY W/ POLYPECTOMY      MAMMO (HISTORICAL) Bilateral 2021    GVH  birads  1  50-75% d    SKIN CANCER EXCISION Left     leg, melanoma in situ    TONSILLECTOMY         Family History   Problem Relation Age of Onset    Hypertension Mother     Pulmonary embolism Mother     Uterine cancer Mother     Irregular heart beat Mother         has " pacemaker    Heart attack Father         age 42    Dementia Father     Coronary artery disease Father     Hypertension Sister     Atrial fibrillation Brother     Hypertension Brother     Atrial fibrillation Brother     Lymphoma Family         Precursor B-cell Lymphoblastic of the bone    Cancer Family     No Known Problems Daughter     Melanoma Other     Breast cancer Neg Hx     Ovarian cancer Neg Hx     Colon cancer Neg Hx      I have reviewed and agree with the history as documented.    E-Cigarette/Vaping    E-Cigarette Use Never User      E-Cigarette/Vaping Substances    Nicotine No     THC No     CBD No     Flavoring No     Other No     Unknown No      Social History     Tobacco Use    Smoking status: Never    Smokeless tobacco: Never   Vaping Use    Vaping status: Never Used   Substance Use Topics    Alcohol use: Yes     Comment: social drinker per [employer]    Drug use: No       Review of Systems   Constitutional:  Negative for appetite change and fever.   HENT:  Negative for rhinorrhea and sore throat.    Eyes:  Negative for photophobia and visual disturbance.   Respiratory:  Negative for cough, chest tightness and wheezing.    Cardiovascular:  Negative for chest pain, palpitations and leg swelling.   Gastrointestinal:  Positive for abdominal pain, nausea and vomiting. Negative for abdominal distention, blood in stool, constipation and diarrhea.   Genitourinary:  Negative for dysuria, flank pain, frequency, hematuria and urgency.   Musculoskeletal:  Negative for back pain.   Skin:  Negative for rash.   Neurological:  Negative for dizziness, weakness and headaches.   All other systems reviewed and are negative.      Physical Exam  Physical Exam  Vitals and nursing note reviewed.   Constitutional:       Appearance: She is well-developed.   HENT:      Head: Normocephalic and atraumatic.   Cardiovascular:      Rate and Rhythm: Normal rate and regular rhythm.      Heart sounds: No murmur heard.     No friction  rub. No gallop.   Pulmonary:      Effort: Pulmonary effort is normal.      Breath sounds: No wheezing or rales.   Chest:      Chest wall: No tenderness.   Abdominal:      General: There is no distension.      Palpations: Abdomen is soft. There is no mass.      Tenderness: There is abdominal tenderness in the epigastric area. There is no guarding or rebound.   Skin:     General: Skin is warm and dry.   Neurological:      Mental Status: She is alert and oriented to person, place, and time.         Vital Signs  ED Triage Vitals   Temperature Pulse Respirations Blood Pressure SpO2   02/22/24 0050 02/22/24 0048 02/22/24 0048 02/22/24 0048 02/22/24 0048   97.9 °F (36.6 °C) 72 18 140/62 95 %      Temp Source Heart Rate Source Patient Position - Orthostatic VS BP Location FiO2 (%)   02/22/24 0050 02/22/24 0048 02/22/24 0048 02/22/24 0048 --   Oral Monitor Lying Right arm       Pain Score       02/22/24 0048       10 - Worst Possible Pain           Vitals:    02/22/24 0100 02/22/24 0200 02/22/24 0230 02/22/24 0330   BP: 129/61 125/73 131/65 119/56   Pulse: 74 69 72 74   Patient Position - Orthostatic VS: Lying Lying  Lying         Visual Acuity      ED Medications  Medications   sodium chloride 0.9 % bolus 1,000 mL (1,000 mL Intravenous New Bag 2/22/24 0210)   ondansetron (ZOFRAN) injection 4 mg (4 mg Intravenous Given 2/22/24 0207)   pantoprazole (PROTONIX) injection 40 mg (40 mg Intravenous Given 2/22/24 0207)   ketorolac (TORADOL) injection 15 mg (15 mg Intravenous Given 2/22/24 0208)   potassium chloride (Klor-Con M20) CR tablet 40 mEq (40 mEq Oral Given 2/22/24 0224)   iohexol (OMNIPAQUE) 350 MG/ML injection (MULTI-DOSE) 100 mL (100 mL Intravenous Given 2/22/24 0256)       Diagnostic Studies  Results Reviewed       Procedure Component Value Units Date/Time    Urine Microscopic [[id number]]  (Abnormal) Collected: 02/22/24 0301    Lab Status: Final result Specimen: Urine, Clean Catch Updated: 02/22/24 0333     RBC, UA 0-1  /hpf      WBC, UA 1-2 /hpf      Epithelial Cells Occasional /hpf      Bacteria, UA Innumerable /hpf     UA w Reflex to Microscopic w Reflex to Culture [[id number]]  (Abnormal) Collected: 02/22/24 0301    Lab Status: Final result Specimen: Urine, Clean Catch Updated: 02/22/24 0333     Color, UA Yellow     Clarity, UA Clear     Specific Gravity, UA 1.015     pH, UA 7.5     Leukocytes, UA Negative     Nitrite, UA Negative     Protein, UA Trace mg/dl      Glucose, UA Negative mg/dl      Ketones, UA Negative mg/dl      Urobilinogen, UA <2.0 mg/dl      Bilirubin, UA Negative     Occult Blood, UA Negative    HS Troponin 0hr (reflex protocol) [[id number]]  (Normal) Collected: 02/22/24 0106    Lab Status: Final result Specimen: Blood from Arm, Right Updated: 02/22/24 0132     hs TnI 0hr <2 ng/L     CMP [[id number]]  (Abnormal) Collected: 02/22/24 0100    Lab Status: Final result Specimen: Blood from Arm, Right Updated: 02/22/24 0128     Sodium 137 mmol/L      Potassium 3.3 mmol/L      Chloride 96 mmol/L      CO2 33 mmol/L      ANION GAP 8 mmol/L      BUN 23 mg/dL      Creatinine 0.93 mg/dL      Glucose 138 mg/dL      Calcium 9.8 mg/dL      AST 12 U/L      ALT 10 U/L      Alkaline Phosphatase 65 U/L      Total Protein 7.4 g/dL      Albumin 4.4 g/dL      Total Bilirubin 0.71 mg/dL      eGFR 65 ml/min/1.73sq m     Narrative:      National Kid[employer] Disease Foundation guidelines for Chronic Kidney Disease (CKD):     Stage 1 with normal or high GFR (GFR > 90 mL/min/1.73 square meters)    Stage 2 Mild CKD (GFR = 60-89 mL/min/1.73 square meters)    Stage 3A Moderate CKD (GFR = 45-59 mL/min/1.73 square meters)    Stage 3B Moderate CKD (GFR = 30-44 mL/min/1.73 square meters)    Stage 4 Severe CKD (GFR = 15-29 mL/min/1.73 square meters)    Stage 5 End Stage CKD (GFR <15 mL/min/1.73 square meters)  Note: GFR calculation is accurate only with a steady state creatinine    Lipase [[id number]]  (Abnormal) Collected: 02/22/24 0100    Lab Status:  Final result Specimen: Blood from Arm, Right Updated: 02/22/24 0128     Lipase 7 u/L     CBC and differential [[id number]]  (Abnormal) Collected: 02/22/24 0100    Lab Status: Final result Specimen: Blood from Arm, Right Updated: 02/22/24 0106     WBC 11.92 Thousand/uL      RBC 4.63 Million/uL      Hemoglobin 13.5 g/dL      Hematocrit 41.6 %      MCV 90 fL      MCH 29.2 pg      MCHC 32.5 g/dL      RDW 12.8 %      MPV 9.6 fL      Platelets 256 Thousands/uL      nRBC 0 /100 WBCs      Neutrophils Relative 77 %      Immat GRANS % 1 %      Lymphocytes Relative 16 %      Monocytes Relative 5 %      Eosinophils Relative 1 %      Basophils Relative 0 %      Neutrophils Absolute 9.27 Thousands/µL      Immature Grans Absolute 0.07 Thousand/uL      Lymphocytes Absolute 1.88 Thousands/µL      Monocytes Absolute 0.60 Thousand/µL      Eosinophils Absolute 0.07 Thousand/µL      Basophils Absolute 0.03 Thousands/µL                    CT abdomen pelvis with contrast   Final Result by Richard Nix MD (02/22 0312)      Multiple mildly dilated fluid-filled small bowel loops with trace mesenteric edema without transition point to suggest obstruction. Findings may be due to nonspecific enteritis.         Workstation performed: [id number]                    Procedures  Procedures         ED Course  ED Course as of 02/22/24 0401   Thu Feb 22, 2024 0035 Most recently seen by PCP notes reviewed from 10/12/24 patient with history of diabetes, hypertension, palpitations, stable chronic conditions   0127 Procedure Note: EKG  Date/Time: 02/22/24 1:27 AM   Performed by: ABHISHEK VELOZ  Authorized by: ABHISHEK VELOZ  Indications / Diagnosis: CP  ECG reviewed by me, the ED Provider: yes   The EKG demonstrates:  Rhythm: normal sinus  Intervals: normal intervals  Axis:normal axis  QRS/Blocks: normal QRS  ST Changes: No acute ST Changes, no STD/BONY.       0356 Bacteria, UA(!): Innumerable  No leukocytes, asymptomatic, low suspicion for acute infection    0357 CT with findings of enteritis otherwise feeling better, stable for discharge at this time, no indication for further inpatient evaluation or treatment             HEART Risk Score      Flowsheet Row Most Recent Value   Heart Score Risk Calculator    History 0 Filed at: 02/22/2024 0358   ECG 0 Filed at: 02/22/2024 0358   Age 1 Filed at: 02/22/2024 0358   Risk Factors 0 Filed at: 02/22/2024 0358   Troponin 0 Filed at: 02/22/2024 0358   HEART Score 1 Filed at: 02/22/2024 0358                          SBIRT 22yo+      Flowsheet Row Most Recent Value   Initial Alcohol Screen: US AUDIT-C     1. How often do you have a drink containing alcohol? 0 Filed at: 02/22/2024 0048   2. How many drinks containing alcohol do you have on a typical day you are drinking?  0 Filed at: 02/22/2024 0048   3a. Male UNDER 65: How often do you have five or more drinks on one occasion? 0 Filed at: 02/22/2024 0048   3b. FEMALE Any Age, or MALE 65+: How often do you have 4 or more drinks on one occassion? 0 Filed at: 02/22/2024 0048   Audit-C Score 0 Filed at: 02/22/2024 0048   SG: How many times in the past year have you...    Used an illegal drug or used a prescription medication for non-medical reasons? Never Filed at: 02/22/2024 0048                      Medical Decision Making  64-year-old female with epigastric abdominal pain, differential diagnosis includes ACS, arrhythmia, pancreatitis, gallbladder pathology, enteritis    Will obtain lab work, imaging will reevaluate    Amount and/or Complexity of Data Reviewed  Labs:  Decision-making details documented in ED Course.  Radiology: ordered.    Risk  Prescription drug management.             Disposition  Final diagnoses:   Epigastric pain   Enteritis     Time reflects when diagnosis was documented in both MDM as applicable and the Disposition within this note       Time User Action Codes Description Comment    2/22/2024  1:56 AM Sigrid Escobedo Add [R10.13] Epigastric pain     2/22/2024   3:58 AM Sigrid Escobedo [K52.9] Enteritis           ED Disposition       ED Disposition   Discharge    Condition   Stable    Date/Time   Thu Feb 22, 2024 0400    Comment   Jennie Winter discharge to home/self care.                   Follow-up Information       Follow up With Specialties Details Why Contact Info Additional Information     [facility] Emergency Department Emergency Medicine  If symptoms worsen [address] [facility] [address] [phone] [facility] Emergency Department, [address] [place], [place] [address]    Aleja Frost DO Family Medicine Schedule an appointment as soon as possible for a visit   [address] [place] [address]  [phone]               Patient's Medications   Discharge Prescriptions    ONDANSETRON (ZOFRAN ODT) 4 MG DISINTEGRATING TABLET    Take 1 tablet (4 mg total) by mouth every 6 (six) hours as needed for nausea or vomiting       Start Date: 2/22/2024 End Date: --       Order Dose: 4 mg       Quantity: 20 tablet    Refills: 0    PANTOPRAZOLE (PROTONIX) 20 MG TABLET    Take 1 tablet (20 mg total) by mouth daily       Start Date: 2/22/2024 End Date: --       Order Dose: 20 mg       Quantity: 20 tablet    Refills: 0       No discharge procedures on file.    PDMP Review       None            ED Provider  Electronically Signed by             Sigrid Escobedo DO  02/22/24 0406

## 2024-02-22 NOTE — Clinical Note
Jennie Winter was seen and treated in our emergency department on 2/22/2024.    No restrictions            Diagnosis:     Jennie  may return to work on return date.    She may return on this date: 02/26/2024         If you have any questions or concerns, please don't hesitate to call.      Sigrid Escobedo, DO    ______________________________           _______________          _______________  Hospital Representative                              Date                                Time

## 2024-02-22 NOTE — Clinical Note
Case was discussed with General Surgery and the patient's admission status was agreed to be Admission Status: inpatient status to the service of Dr. Quintanilla .

## 2024-04-05 ENCOUNTER — TELEPHONE (OUTPATIENT)
Dept: FAMILY MEDICINE CLINIC | Facility: CLINIC | Age: 64
End: 2024-04-05

## 2024-04-05 DIAGNOSIS — R39.9 SYMPTOMS OF URINARY TRACT INFECTION: Primary | ICD-10-CM

## 2024-04-05 RX ORDER — NITROFURANTOIN 25; 75 MG/1; MG/1
100 CAPSULE ORAL 2 TIMES DAILY
Qty: 14 CAPSULE | Refills: 0 | Status: SHIPPED | OUTPATIENT
Start: 2024-04-05 | End: 2024-04-12

## 2024-04-05 NOTE — TELEPHONE ENCOUNTER
Patient is calling because she believes she may have a possible UTI. Patient said that since last night there has been a burning pain, pressure, odor, and frequent trips to the bathroom. Patient was recommend to leave a urine sample, but would not be able to do so because she will be at work. Patient did schedule a physical for 4/9. Patient would like to know if they doctor would be able to prescribe an abx to the pharmacy.    [facility]    Please advise

## 2024-04-09 ENCOUNTER — OFFICE VISIT (OUTPATIENT)
Dept: FAMILY MEDICINE CLINIC | Facility: CLINIC | Age: 64
End: 2024-04-09
Payer: COMMERCIAL

## 2024-04-09 VITALS
SYSTOLIC BLOOD PRESSURE: 104 MMHG | HEIGHT: 66 IN | TEMPERATURE: 97.6 F | OXYGEN SATURATION: 97 % | HEART RATE: 93 BPM | WEIGHT: 201 LBS | DIASTOLIC BLOOD PRESSURE: 62 MMHG | BODY MASS INDEX: 32.3 KG/M2

## 2024-04-09 DIAGNOSIS — I10 ESSENTIAL HYPERTENSION: ICD-10-CM

## 2024-04-09 DIAGNOSIS — M79.89 MASS OF SOFT TISSUE OF THIGH: ICD-10-CM

## 2024-04-09 DIAGNOSIS — M79.10 MYALGIA: ICD-10-CM

## 2024-04-09 DIAGNOSIS — Z00.00 WELL ADULT EXAM: Primary | ICD-10-CM

## 2024-04-09 DIAGNOSIS — Z78.0 POSTMENOPAUSAL: ICD-10-CM

## 2024-04-09 DIAGNOSIS — E66.09 CLASS 1 OBESITY DUE TO EXCESS CALORIES WITH SERIOUS COMORBIDITY AND BODY MASS INDEX (BMI) OF 32.0 TO 32.9 IN ADULT: ICD-10-CM

## 2024-04-09 PROBLEM — R10.13 EPIGASTRIC PAIN: Status: RESOLVED | Noted: 2021-08-17 | Resolved: 2024-04-09

## 2024-04-09 PROBLEM — M25.50 ARTHRALGIA OF MULTIPLE JOINTS: Status: RESOLVED | Noted: 2021-06-20 | Resolved: 2024-04-09

## 2024-04-09 PROBLEM — T14.8XXA HEMATOMA: Status: RESOLVED | Noted: 2022-10-11 | Resolved: 2024-04-09
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
## 2024-04-09 PROCEDURE — 99396 PREV VISIT EST AGE 40-64: CPT | Performed by: FAMILY MEDICINE

## 2024-04-09 NOTE — PATIENT INSTRUCTIONS
Schedule mammogram   Schedule dexa scan  Schedule ultrasound right upper leg-   Hamstring Exercises   WHAT YOU NEED TO KNOW:   What do I need to know about hamstring exercises?  Hamstring exercises help strengthen and stretch the muscles that support your lower back, hips, and knee. This decreases pain, improves movement, and lowers your risk for another injury.  What do I need to know about exercise safety?   Move slowly and smoothly.  Avoid fast or jerky motions to help prevent another injury.    Breathe normally.  Do not hold your breath. It is important to breathe in and out so you do not tense up during exercise. Tension could prevent your muscles from stretching.    Do the exercises and stretches on both legs.  Do this so the muscles on both legs remain strong and flexible.    Stop if you feel sharp pain or an increase in pain.  You may feel discomfort during exercise, such as a dull ache, but you should not feel pain. Discomfort should get better with regular exercise. Pain may be a sign of an injury that needs to be treated. Let your healthcare provider or physical therapist know about your pain.    Warm up before you stretch and exercise.  This will help prevent an injury. Walk or ride a stationary bike for 5 to 10 minutes.    How do I perform stretching exercises?  Ask your healthcare provider or physical therapist how often to do these stretches:  Hamstring stretch with a towel:  Lie on your back on the floor. Bend both legs so your feet rest on the floor. Lift one leg off the floor and loop a towel around your foot. Grasp the ends of the towel and slowly straighten your lifted leg. Use the towel to gently pull your leg toward you until you feel a stretch. Keep your leg straight and your foot flexed toward your body. Hold for 30 seconds. Use a longer towel if needed.         Sitting hamstring stretch:  Sit on the floor with both legs straight in front of you. Do not point your toes or flex your feet.  Place your palms on the floor and slide your hands forward until you feel the stretch. Keep your back straight and do not lock your knees. Hold the stretch for 30 seconds.         Standing hamstring stretch:  Stand with your feet hips distance apart. Life one leg and rest it on a firm surface, such as a table or chair. Keep your toes pointing up. Slide your hands forward along the side of your leg until you feel a stretch. Keep your chest lifted and your back straight. Hold for 30 seconds.         Sitting wide-leg stretch:  Sit on the floor and extend your legs as wide as possible. Do not let your feet roll in or out. Keep your legs straight and lean over one leg. Slide your hands forward until you feel a stretch. Keep your chest lifted and your back straight. Hold for 30 seconds.    How do I perform strengthening exercises?  Always do strengthening exercises after you stretch. As you get stronger, your healthcare provider or physical therapist may tell you to you add weights or more repetitions to your strengthening exercises. He or she will tell you how much weight to use.  Hamstring curls:  Put an ankle weight on your injured leg. Place your hand on a wall or the back of a chair for balance. Lift the leg and raise your heel toward your buttocks. Hold for 5 seconds. Slowly lower your foot until it is a few inches off the floor. Do 3 sets of 10. Repeat on other side.         Straight leg raise:  Lie on the floor with your face down. Rest your forehead on your folded arms. Keep your body in a straight line. Keep your hip bones on the floor, and tighten the butt and thigh muscles of your injured leg. Keep one leg straight and raise it toward the ceiling as high as you can. Hold for 5 seconds. Slowly return to the starting position. Do 3 sets of 10. Repeat on other side.         Half squats:  Stand with your feet shoulder distance apart. Rest your hands on the front of your thighs or reach them out in front of you.  You may hold on to the back of a chair or wall for balance. Keep your chest lifted and lower your hips about 10 inches, as if you are going to sit. Make sure your weight is in your heels and hold for 5 seconds. Keep your weight in your heels and slowly stand. Do 3 sets of 10.       When should I call my doctor or physical therapist?   You have sharp or worsening pain during exercise or at rest.    You have questions or concern about your condition, care, or exercise program.    CARE AGREEMENT:   You have the right to help plan your care. Learn about your health condition and how it may be treated. Discuss treatment options with your healthcare providers to decide what care you want to receive. You always have the right to refuse treatment. The above information is an  only. It is not intended as medical advice for individual conditions or treatments. Talk to your doctor, nurse or pharmacist before following any medical regimen to see if it is safe and effective for you.  © Copyright [employer] 2023 Information is for End User's use only and may not be sold, redistributed or otherwise used for commercial purposes.    Wellness Visit for Adults   AMBULATORY CARE:   A wellness visit  is when you see your healthcare provider to get screened for health problems. Your healthcare provider will also give you advice on how to stay healthy. Write down your questions so you remember to ask them. Ask your healthcare provider how often you should have a wellness visit.  What happens at a wellness visit:  Your healthcare provider will ask about your health, and your family history of health problems. This includes high blood pressure, heart disease, and cancer. He or she will ask if you have symptoms that concern you, if you smoke, and about your mood. You may also be asked about your intake of medicines, supplements, food, and alcohol. Any of the following may be done:  Your weight  will be checked. Your height may  also be checked so your body mass index (BMI) can be calculated. Your BMI shows if you are at a healthy weight.    Your blood pressure  and heart rate will be checked. Your temperature may also be checked.    Blood and urine tests  may be done. Blood tests may be done to check your cholesterol levels. Abnormal cholesterol levels increase your risk for heart disease and stroke. You may also need a blood or urine test to check for diabetes if you are at increased risk. Urine tests may be done to look for signs of an infection or kidney disease.    A physical exam  includes checking your heartbeat and lungs with a stethoscope. Your healthcare provider may also check your skin to look for sun damage.    Screening tests  may be recommended. A screening test is done to check for diseases that may not cause symptoms. The screening tests you may need depend on your age, gender, family history, and lifestyle habits. For example, colorectal screening may be recommended if you are 50 years old or older.    Screening tests you need if you are a woman:   A Pap smear  is used to screen for cervical cancer. Pap smears are usually done every 3 to 5 years depending on your age. You may need them more often if you have had abnormal Pap smear test results in the past. Ask your healthcare provider how often you should have a Pap smear.    A mammogram  is an x-ray of your breasts to screen for breast cancer. Experts recommend mammograms every 2 years starting at age 50 years. You may need a mammogram at age 49 years or younger if you have an increased risk for breast cancer. Talk to your healthcare provider about when you should start having mammograms and how often you need them.    Vaccines you may need:   Get an influenza vaccine  every year. The influenza vaccine protects you from the flu. Several types of viruses cause the flu. The viruses change over time, so new vaccines are made each year.    Get a tetanus-diphtheria (Td)  booster vaccine  every 10 years. This vaccine protects you against tetanus and diphtheria. Tetanus is a severe infection that may cause painful muscle spasms and lockjaw. Diphtheria is a severe bacterial infection that causes a thick covering in the back of your mouth and throat.    Get a human papillomavirus (HPV) vaccine  if you are female and aged 19 to 26 or male 19 to 21 and never received it. This vaccine protects you from HPV infection. HPV is the most common infection spread by sexual contact. HPV may also cause vaginal, penile, and anal cancers.    Get a pneumococcal vaccine  if you are aged 65 years or older. The pneumococcal vaccine is an injection given to protect you from pneumococcal disease. Pneumococcal disease is an infection caused by pneumococcal bacteria. The infection may cause pneumonia, meningitis, or an ear infection.    Get a shingles vaccine  if you are 60 or older, even if you have had shingles before. The shingles vaccine is an injection to protect you from the varicella-zoster virus. This is the same virus that causes chickenpox. Shingles is a painful rash that develops in people who had chickenpox or have been exposed to the virus.    How to eat healthy:  My Plate is a model for planning healthy meals. It shows the types and amounts of foods that should go on your plate. Fruits and vegetables make up about half of your plate, and grains and protein make up the other half. A serving of dairy is included on the side of your plate. The amount of calories and serving sizes you need depends on your age, gender, weight, and height. Examples of healthy foods are listed below:  Eat a variety of vegetables  such as dark green, red, and orange vegetables. You can also include canned vegetables low in sodium (salt) and frozen vegetables without added butter or sauces.    Eat a variety of fresh fruits , canned fruit in 100% juice, frozen fruit, and dried fruit.    Include whole grains.  At least  half of the grains you eat should be whole grains. Examples include whole-wheat bread, wheat pasta, brown rice, and whole-grain cereals such as oatmeal.    Eat a variety of protein foods such as seafood (fish and shellfish), lean meat, and poultry without skin (turkey and chicken). Examples of lean meats include pork leg, shoulder, or tenderloin, and beef round, sirloin, tenderloin, and extra lean ground beef. Other protein foods include eggs and egg substitutes, beans, peas, soy products, nuts, and seeds.    Choose low-fat dairy products such as skim or 1% milk or low-fat yogurt, cheese, and cottage cheese.    Limit unhealthy fats  such as butter, hard margarine, and shortening.       Exercise:  Exercise at least 30 minutes per day on most days of the week. Some examples of exercise include walking, biking, dancing, and swimming. You can also fit in more physical activity by taking the stairs instead of the elevator or parking farther away from stores. Include muscle strengthening activities 2 days each week. Regular exercise provides many health benefits. It helps you manage your weight, and decreases your risk for type 2 diabetes, heart disease, stroke, and high blood pressure. Exercise can also help improve your mood. Ask your healthcare provider about the best exercise plan for you.       General health and safety guidelines:   Do not smoke.  Nicotine and other chemicals in cigarettes and cigars can cause lung damage. Ask your healthcare provider for information if you currently smoke and need help to quit. E-cigarettes or smokeless tobacco still contain nicotine. Talk to your healthcare provider before you use these products.    Limit alcohol.  A drink of alcohol is 12 ounces of beer, 5 ounces of wine, or 1½ ounces of liquor.    Lose weight, if needed.  Being overweight increases your risk of certain health conditions. These include heart disease, high blood pressure, type 2 diabetes, and certain types of  cancer.    Protect your skin.  Do not sunbathe or use tanning beds. Use sunscreen with a SPF 15 or higher. Apply sunscreen at least 15 minutes before you go outside. Reapply sunscreen every 2 hours. Wear protective clothing, hats, and sunglasses when you are outside.    Drive safely.  Always wear your seatbelt. Make sure everyone in your car wears a seatbelt. A seatbelt can save your life if you are in an accident. Do not use your cell phone when you are driving. This could distract you and cause an accident. Pull over if you need to make a call or send a text message.    Practice safe sex.  Use latex condoms if are sexually active and have more than one partner. Your healthcare provider may recommend screening tests for sexually transmitted infections (STIs).    Wear helmets, lifejackets, and protective gear.  Always wear a helmet when you ride a bike or motorcycle, go skiing, or play sports that could cause a head injury. Wear protective equipment when you play sports. Wear a lifejacket when you are on a boat or doing water sports.    © Copyright [employer] 2023 Information is for End User's use only and may not be sold, redistributed or otherwise used for commercial purposes.  The above information is an  only. It is not intended as medical advice for individual conditions or treatments. Talk to your doctor, nurse or pharmacist before following any medical regimen to see if it is safe and effective for you.

## 2024-04-09 NOTE — PROGRESS NOTES
ADULT ANNUAL PHYSICAL  [facility]    NAME: Jennie Winter  AGE: 64 y.o. SEX: female  : 1960     DATE: 2024     Assessment and Plan:     1. Well adult exam    2. Essential hypertension  Assessment & Plan:  Controlled, continue current medication       3. Mass of soft tissue of thigh  -     US extremity soft tissue; Future; Expected date: 2024    4. Myalgia  Assessment & Plan:  Right hamstring, given stretches. Ok for physical therapy- pt states too expensive. Had chiropractic care and massage therapy with temporary relief       5. Class 1 obesity due to excess calories with serious comorbidity and body mass index (BMI) of 32.0 to 32.9 in adult    6. Postmenopausal  Assessment & Plan:  Schedule dexa scan           Immunizations and preventive care screenings were discussed with patient today. Appropriate education was printed on patient's after visit summary.    Counseling:  Dental Health: discussed importance of regular tooth brushing, flossing, and dental visits.  Exercise: the importance of regular exercise/physical activity was discussed. Recommend exercise 3-5 times per week for at least 30 minutes.          No follow-ups on file.     Chief Complaint:     Chief Complaint   Patient presents with    Physical Exam     Yearly physical       History of Present Illness:     Pt is here for a physical today. 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  Once she stretches, pain resolves. Pt has seen chiropractor, massage therapy with temporary relief. Physical therapy too  expensive.           Review of Systems:     Review of Systems   Constitutional: Negative.  Negative for fatigue and fever.   HENT: Negative.     Eyes: Negative.    Respiratory: Negative.  Negative for cough.    Cardiovascular: Negative.    Gastrointestinal: Negative.    Endocrine: Negative.    Genitourinary: Negative.    Musculoskeletal:  Positive for myalgias.        Pain right hamstring   Skin:         Lump right thigh   Allergic/Immunologic: Negative.    Neurological: Negative.    Psychiatric/Behavioral: Negative.        Past Medical History:     Past Medical History:   Diagnosis Date    Acute cystitis with hematuria 2020    Arthralgia of multiple joints 2021    COVID 2020    Epigastric pain 2021    Fall with injury 10/11/2022    Fracture of distal end of radius     Hematoma 10/11/2022    Herniated nucleus pulposus, L5-S1     Hyperlipidemia     Laryngitis 2019    Malignant melanoma of skin (HCC)     Resolved: 2017    Migraine     Oropharyngeal dysphagia 2019    Papanicolaou smear for cervical cancer screening     Plantar fasciitis     Last Assessed: 2016      Past Surgical History:     Past Surgical History:   Procedure Laterality Date     SECTION      HYSTEROSCOPY      HYSTEROSCOPY W/ POLYPECTOMY      MAMMO (HISTORICAL) Bilateral 2021    [facility]  birads  1  50-75% d    SKIN CANCER EXCISION Left     leg, melanoma in situ    TONSILLECTOMY        Social History:     Social History     Socioeconomic History    Marital status: /Civil Union     Spouse name: None    Number of children: None    Years of education: None    Highest education level: None   Occupational History    Occupation:    Tobacco Use    Smoking status: Never    Smokeless tobacco: Never   Vaping Use    Vaping status: Never Used   Substance and Sexual Activity    Alcohol use: Yes     Comment: social drinker per [employer]    Drug use: No    Sexual activity: Yes      "Partners: Male     Birth control/protection: Post-menopausal   Other Topics Concern    None   Social History Narrative    Do you smoke marijuana? No    Domestic violence: No    Pets: Cats,Dogs    currently sexually active, no new partner in last year     Social Determinants of Health     Financial Resource Strain: Not on file   Food Insecurity: Not on file   Transportation Needs: Not on file   Physical Activity: Not on file   Stress: Not on file   Social Connections: Not on file   Intimate Partner Violence: Not on file   Housing Stability: Not on file      Family History:     Family History   Problem Relation Age of Onset    Hypertension Mother     Pulmonary embolism Mother     Uterine cancer Mother     Irregular heart beat Mother         has pacemaker    Heart attack Father         age 42    Dementia Father     Coronary artery disease Father     Hypertension Sister     Atrial fibrillation Brother     Hypertension Brother     Atrial fibrillation Brother     Lymphoma Family         Precursor B-cell Lymphoblastic of the bone    Cancer Family     No Known Problems Daughter     Melanoma Other     Breast cancer Neg Hx     Ovarian cancer Neg Hx     Colon cancer Neg Hx       Current Medications:     Current Outpatient Medications   Medication Sig Dispense Refill    hydrochlorothiazide (HYDRODIURIL) 12.5 mg tablet Take 1 tablet (12.5 mg total) by mouth daily 30 tablet 11    losartan (COZAAR) 50 mg tablet Take 1 tablet (50 mg total) by mouth daily 90 tablet 3    nitrofurantoin (MACROBID) 100 mg capsule Take 1 capsule (100 mg total) by mouth 2 (two) times a day for 7 days 14 capsule 0     No current facility-administered medications for this visit.      Allergies:     No Known Allergies   Physical Exam:     /62   Pulse 93   Temp 97.6 °F (36.4 °C) (Temporal)   Ht 5' 6\" (1.676 m)   Wt 91.2 kg (201 lb)   SpO2 97%   BMI 32.44 kg/m²     Physical Exam  Vitals and nursing note reviewed.   Constitutional:       " Appearance: She is well-developed.   HENT:      Head: Normocephalic and atraumatic.      Right Ear: External ear normal.      Left Ear: External ear normal.      Nose: Nose normal.   Eyes:      Conjunctiva/sclera: Conjunctivae normal.      Pupils: Pupils are equal, round, and reactive to light.   Cardiovascular:      Rate and Rhythm: Normal rate and regular rhythm.      Heart sounds: Normal heart sounds.   Pulmonary:      Effort: Pulmonary effort is normal.      Breath sounds: Normal breath sounds.   Abdominal:      General: Bowel sounds are normal.      Palpations: Abdomen is soft.   Musculoskeletal:         General: Normal range of motion.      Cervical back: Normal range of motion and neck supple.   Skin:     General: Skin is warm and dry.      Comments: 1.0cm mass right upper thigh, consistency of lipoma    Neurological:      Mental Status: She is alert and oriented to person, place, and time.   Psychiatric:         Behavior: Behavior normal.         Thought Content: Thought content normal.         Judgment: Judgment normal.          Aleja Frost DO  [facility]

## 2024-04-09 NOTE — ASSESSMENT & PLAN NOTE
Right hamstring, given stretches. Ok for physical therapy- pt states too expensive. Had chiropractic care and massage therapy with temporary relief

## 2024-04-16 ENCOUNTER — OFFICE VISIT (OUTPATIENT)
Dept: FAMILY MEDICINE CLINIC | Facility: CLINIC | Age: 64
End: 2024-04-16
Payer: COMMERCIAL

## 2024-04-16 VITALS
HEIGHT: 66 IN | OXYGEN SATURATION: 94 % | SYSTOLIC BLOOD PRESSURE: 140 MMHG | WEIGHT: 199 LBS | HEART RATE: 70 BPM | BODY MASS INDEX: 31.98 KG/M2 | DIASTOLIC BLOOD PRESSURE: 92 MMHG | TEMPERATURE: 95.3 F

## 2024-04-16 DIAGNOSIS — J20.9 ACUTE BRONCHITIS, UNSPECIFIED ORGANISM: Primary | ICD-10-CM

## 2024-04-16 PROCEDURE — 99213 OFFICE O/P EST LOW 20 MIN: CPT | Performed by: NURSE PRACTITIONER

## 2024-04-16 RX ORDER — AZITHROMYCIN 250 MG/1
TABLET, FILM COATED ORAL
Qty: 6 TABLET | Refills: 0 | Status: SHIPPED | OUTPATIENT
Start: 2024-04-16 | End: 2024-04-20

## 2024-04-16 NOTE — PROGRESS NOTES
"Name: Jennie Winter      : 1960      MRN: [id number]  Encounter Provider: CHRISTOS Roth  Encounter Date: 2024   Encounter department: [facility]    Assessment & Plan     1. Acute bronchitis, unspecified organism  Assessment & Plan:  Recommend Mucinex DM to help suppress cough and keeps mucous clear  Azithromycin daily for 5 days    Orders:  -     azithromycin (ZITHROMAX) 250 mg tablet; Take 2 tablets today then 1 tablet daily x 4 days         Subjective      Sick last Wednesday had a sore throat Saturday high fever tmax 103F and all weekend and today 102F in middle of night - did take tylenol, she did covid test- negative. coughing and congested and in her chest sore throat, sinus pressure/ Yesterday sinus pressure has lessened and now everything is draining out of her chest. Cough is worsening. Feels herself wheezing. Hasn't taken anything for symptoms. Cough is productive. Cough is worse laying down       Review of Systems   Constitutional:  Positive for fatigue and fever. Negative for appetite change.   HENT:  Positive for rhinorrhea, sinus pressure and sore throat.    Eyes: Negative.    Respiratory:  Positive for cough. Negative for chest tightness and shortness of breath.    Cardiovascular: Negative.    Gastrointestinal: Negative.    Neurological:  Positive for headaches.   Hematological:  Negative for adenopathy.       Current Outpatient Medications on File Prior to Visit   Medication Sig   • hydrochlorothiazide (HYDRODIURIL) 12.5 mg tablet Take 1 tablet (12.5 mg total) by mouth daily   • losartan (COZAAR) 50 mg tablet Take 1 tablet (50 mg total) by mouth daily       Objective     /92   Pulse 70   Temp (!) 95.3 °F (35.2 °C) (Tympanic)   Ht 5' 6\" (1.676 m)   Wt 90.3 kg (199 lb)   SpO2 94%   BMI 32.12 kg/m²     Physical Exam  Constitutional:       General: She is not in acute distress.     Appearance: Normal appearance. She is not ill-appearing. "   HENT:      Head: Normocephalic.      Right Ear: Ear canal and external ear normal.      Left Ear: Ear canal and external ear normal.      Nose: Congestion and rhinorrhea present.      Mouth/Throat:      Mouth: Mucous membranes are moist.      Pharynx: Posterior oropharyngeal erythema present.   Eyes:      General: No scleral icterus.        Right eye: No discharge.         Left eye: No discharge.      Extraocular Movements: Extraocular movements intact.      Conjunctiva/sclera: Conjunctivae normal.      Pupils: Pupils are equal, round, and reactive to light.   Cardiovascular:      Rate and Rhythm: Normal rate and regular rhythm.      Heart sounds: No murmur heard.  Pulmonary:      Effort: Pulmonary effort is normal.      Breath sounds: Rhonchi present. No wheezing.   Abdominal:      Palpations: Abdomen is soft.      Tenderness: There is no abdominal tenderness.   Musculoskeletal:      Cervical back: Neck supple.   Lymphadenopathy:      Cervical: No cervical adenopathy.   Skin:     General: Skin is warm.      Findings: No rash.   Neurological:      Mental Status: She is alert.       CHRISTOS Roth

## 2024-04-16 NOTE — PATIENT INSTRUCTIONS
Recommend Mucinex DM to help suppress cough and keeps mucous clear  Azithromycin daily for 5 days

## 2024-05-02 DIAGNOSIS — Z12.31 ENCOUNTER FOR SCREENING MAMMOGRAM FOR BREAST CANCER: ICD-10-CM

## 2024-05-03 ENCOUNTER — TELEPHONE (OUTPATIENT)
Dept: FAMILY MEDICINE CLINIC | Facility: CLINIC | Age: 64
End: 2024-05-03

## 2024-05-03 NOTE — TELEPHONE ENCOUNTER
----- Message from CHRISTOS Roth sent at 5/3/2024  1:30 PM EDT -----  Your mammogram shows no evidence of malignancy.  Repeat in 1 year is recommended.

## 2024-05-09 PROBLEM — Z00.00 WELL ADULT EXAM: Status: RESOLVED | Noted: 2018-09-05 | Resolved: 2024-05-09

## 2024-05-22 ENCOUNTER — APPOINTMENT (OUTPATIENT)
Dept: URGENT CARE | Facility: CLINIC | Age: 64
End: 2024-05-22

## 2024-05-23 ENCOUNTER — TELEPHONE (OUTPATIENT)
Age: 64
End: 2024-05-23

## 2024-05-23 NOTE — TELEPHONE ENCOUNTER
Npi: [id number]  Location: [facility] [address], [place] [id number]/Jan Rogel MD/[phone]  Dxcode: Z00.8  Dr. Pebbles walsh  Service date: 5/29/24

## 2024-06-05 ENCOUNTER — HOSPITAL ENCOUNTER (OUTPATIENT)
Dept: ULTRASOUND IMAGING | Facility: HOSPITAL | Age: 64
Discharge: HOME | End: 2024-06-05
Payer: COMMERCIAL

## 2024-06-05 DIAGNOSIS — M79.89 MASS OF SOFT TISSUE OF THIGH: ICD-10-CM

## 2024-06-05 PROCEDURE — 76882 US LMTD JT/FCL EVL NVASC XTR: CPT

## 2024-10-15 DIAGNOSIS — I10 ESSENTIAL HYPERTENSION: ICD-10-CM

## 2024-10-16 RX ORDER — LOSARTAN POTASSIUM 50 MG/1
50 TABLET ORAL DAILY
Qty: 30 TABLET | Refills: 0 | Status: SHIPPED | OUTPATIENT
Start: 2024-10-16

## 2024-10-16 RX ORDER — HYDROCHLOROTHIAZIDE 12.5 MG/1
12.5 TABLET ORAL DAILY
Qty: 30 TABLET | Refills: 0 | Status: SHIPPED | OUTPATIENT
Start: 2024-10-16

## 2024-10-16 NOTE — TELEPHONE ENCOUNTER
Patient needs an appointment. Please contact the patient to schedule an appointment. Last office visit: 04/16/24 patient needs a 6 month appointment.

## 2024-11-12 ENCOUNTER — HOSPITAL ENCOUNTER (OUTPATIENT)
Dept: ULTRASOUND IMAGING | Facility: HOSPITAL | Age: 64
Discharge: HOME | End: 2024-11-12
Payer: COMMERCIAL

## 2024-11-12 DIAGNOSIS — E04.1 LEFT THYROID NODULE: ICD-10-CM

## 2024-11-12 DIAGNOSIS — E04.2 GOITER, NONTOXIC, MULTINODULAR: ICD-10-CM

## 2024-11-12 PROCEDURE — 76536 US EXAM OF HEAD AND NECK: CPT

## 2024-11-17 DIAGNOSIS — I10 ESSENTIAL HYPERTENSION: ICD-10-CM

## 2024-11-19 ENCOUNTER — TELEPHONE (OUTPATIENT)
Age: 64
End: 2024-11-19

## 2024-11-19 DIAGNOSIS — J40 BRONCHITIS: Primary | ICD-10-CM

## 2024-11-19 RX ORDER — LOSARTAN POTASSIUM 50 MG/1
50 TABLET ORAL DAILY
Qty: 30 TABLET | Refills: 0 | Status: SHIPPED | OUTPATIENT
Start: 2024-11-19

## 2024-11-19 RX ORDER — AZITHROMYCIN 250 MG/1
TABLET, FILM COATED ORAL
Qty: 6 TABLET | Refills: 0 | Status: SHIPPED | OUTPATIENT
Start: 2024-11-19 | End: 2024-11-23

## 2024-11-19 RX ORDER — HYDROCHLOROTHIAZIDE 12.5 MG/1
12.5 TABLET ORAL DAILY
Qty: 30 TABLET | Refills: 0 | Status: SHIPPED | OUTPATIENT
Start: 2024-11-19

## 2024-11-19 NOTE — TELEPHONE ENCOUNTER
Pt requesting appt before noon if someone cancels only today before 12pm.  She has a relentless cold and just wants to make sure that her lungs are clear and not getting worse.  She's had over a week now.    She can't make it in tomorrow so just today if someone cancels before 12pm.

## 2024-11-19 NOTE — TELEPHONE ENCOUNTER
sI spoke to pt. She states she started getting sick last Tuesday night. She started with a fever of 101-jeison and mild cough the first three day.After that the fever broke with tylenol but the cough has gotten worse and deeper. She feels/hears rattling in her chest but can produce anything. She states she can not take a deep breath without having a coughing fit. She denies SOB but feels the cough is getting worse.   She also denies taking any medication currently since fever breaking last week.

## 2024-11-22 ENCOUNTER — RESULTS FOLLOW-UP (OUTPATIENT)
Dept: ENDOCRINOLOGY | Facility: HOSPITAL | Age: 64
End: 2024-11-22

## 2024-12-14 DIAGNOSIS — I10 ESSENTIAL HYPERTENSION: ICD-10-CM

## 2024-12-16 RX ORDER — LOSARTAN POTASSIUM 50 MG/1
50 TABLET ORAL DAILY
Qty: 90 TABLET | Refills: 0 | Status: SHIPPED | OUTPATIENT
Start: 2024-12-16

## 2024-12-16 RX ORDER — HYDROCHLOROTHIAZIDE 12.5 MG/1
12.5 TABLET ORAL DAILY
Qty: 90 TABLET | Refills: 0 | Status: SHIPPED | OUTPATIENT
Start: 2024-12-16

## 2024-12-19 NOTE — PATIENT INSTRUCTIONS
Fasting bloodwork 12/1  Mammogram after 12/8 and schedule dexa scan   Cologuard - will come in mail       Wellness Visit for Adults   AMBULATORY CARE:   A wellness visit  is when you see your healthcare provider to get screened for health problems. Your healthcare provider will also give you advice on how to stay healthy. Write down your questions so you remember to ask them. Ask your healthcare provider how often you should have a wellness visit. What happens at a wellness visit:  Your healthcare provider will ask about your health, and your family history of health problems. This includes high blood pressure, heart disease, and cancer. He or she will ask if you have symptoms that concern you, if you smoke, and about your mood. You may also be asked about your intake of medicines, supplements, food, and alcohol. Any of the following may be done: Your weight  will be checked. Your height may also be checked so your body mass index (BMI) can be calculated. Your BMI shows if you are at a healthy weight. Your blood pressure  and heart rate will be checked. Your temperature may also be checked. Blood and urine tests  may be done. Blood tests may be done to check your cholesterol levels. Abnormal cholesterol levels increase your risk for heart disease and stroke. You may also need a blood or urine test to check for diabetes if you are at increased risk. Urine tests may be done to look for signs of an infection or kidney disease. A physical exam  includes checking your heartbeat and lungs with a stethoscope. Your healthcare provider may also check your skin to look for sun damage. Screening tests  may be recommended. A screening test is done to check for diseases that may not cause symptoms. The screening tests you may need depend on your age, gender, family history, and lifestyle habits. For example, colorectal screening may be recommended if you are 48years old or older.     Screening tests you need if you are a woman:   A Pap smear  is used to screen for cervical cancer. Pap smears are usually done every 3 to 5 years depending on your age. You may need them more often if you have had abnormal Pap smear test results in the past. Ask your healthcare provider how often you should have a Pap smear. A mammogram  is an x-ray of your breasts to screen for breast cancer. Experts recommend mammograms every 2 years starting at age 48 years. You may need a mammogram at age 52 years or younger if you have an increased risk for breast cancer. Talk to your healthcare provider about when you should start having mammograms and how often you need them. Vaccines you may need:   Get an influenza vaccine  every year. The influenza vaccine protects you from the flu. Several types of viruses cause the flu. The viruses change over time, so new vaccines are made each year. Get a tetanus-diphtheria (Td) booster vaccine  every 10 years. This vaccine protects you against tetanus and diphtheria. Tetanus is a severe infection that may cause painful muscle spasms and lockjaw. Diphtheria is a severe bacterial infection that causes a thick covering in the back of your mouth and throat. Get a human papillomavirus (HPV) vaccine  if you are female and aged 23 to 32 or male 23 to 24 and never received it. This vaccine protects you from HPV infection. HPV is the most common infection spread by sexual contact. HPV may also cause vaginal, penile, and anal cancers. Get a pneumococcal vaccine  if you are aged 72 years or older. The pneumococcal vaccine is an injection given to protect you from pneumococcal disease. Pneumococcal disease is an infection caused by pneumococcal bacteria. The infection may cause pneumonia, meningitis, or an ear infection. Get a shingles vaccine  if you are 60 or older, even if you have had shingles before. The shingles vaccine is an injection to protect you from the varicella-zoster virus.  This is the same virus that causes chickenpox. Shingles is a painful rash that develops in people who had chickenpox or have been exposed to the virus. How to eat healthy:  My Plate is a model for planning healthy meals. It shows the types and amounts of foods that should go on your plate. Fruits and vegetables make up about half of your plate, and grains and protein make up the other half. A serving of dairy is included on the side of your plate. The amount of calories and serving sizes you need depends on your age, gender, weight, and height. Examples of healthy foods are listed below:  Eat a variety of vegetables  such as dark green, red, and orange vegetables. You can also include canned vegetables low in sodium (salt) and frozen vegetables without added butter or sauces. Eat a variety of fresh fruits , canned fruit in 100% juice, frozen fruit, and dried fruit. Include whole grains. At least half of the grains you eat should be whole grains. Examples include whole-wheat bread, wheat pasta, brown rice, and whole-grain cereals such as oatmeal.    Eat a variety of protein foods such as seafood (fish and shellfish), lean meat, and poultry without skin (turkey and chicken). Examples of lean meats include pork leg, shoulder, or tenderloin, and beef round, sirloin, tenderloin, and extra lean ground beef. Other protein foods include eggs and egg substitutes, beans, peas, soy products, nuts, and seeds. Choose low-fat dairy products such as skim or 1% milk or low-fat yogurt, cheese, and cottage cheese. Limit unhealthy fats  such as butter, hard margarine, and shortening. Exercise:  Exercise at least 30 minutes per day on most days of the week. Some examples of exercise include walking, biking, dancing, and swimming. You can also fit in more physical activity by taking the stairs instead of the elevator or parking farther away from stores. Include muscle strengthening activities 2 days each week.  Regular exercise provides many health benefits. It helps you manage your weight, and decreases your risk for type 2 diabetes, heart disease, stroke, and high blood pressure. Exercise can also help improve your mood. Ask your healthcare provider about the best exercise plan for you. General health and safety guidelines:   Do not smoke. Nicotine and other chemicals in cigarettes and cigars can cause lung damage. Ask your healthcare provider for information if you currently smoke and need help to quit. E-cigarettes or smokeless tobacco still contain nicotine. Talk to your healthcare provider before you use these products. Limit alcohol. A drink of alcohol is 12 ounces of beer, 5 ounces of wine, or 1½ ounces of liquor. Lose weight, if needed. Being overweight increases your risk of certain health conditions. These include heart disease, high blood pressure, type 2 diabetes, and certain types of cancer. Protect your skin. Do not sunbathe or use tanning beds. Use sunscreen with a SPF 15 or higher. Apply sunscreen at least 15 minutes before you go outside. Reapply sunscreen every 2 hours. Wear protective clothing, hats, and sunglasses when you are outside. Drive safely. Always wear your seatbelt. Make sure everyone in your car wears a seatbelt. A seatbelt can save your life if you are in an accident. Do not use your cell phone when you are driving. This could distract you and cause an accident. Pull over if you need to make a call or send a text message. Practice safe sex. Use latex condoms if are sexually active and have more than one partner. Your healthcare provider may recommend screening tests for sexually transmitted infections (STIs). Wear helmets, lifejackets, and protective gear. Always wear a helmet when you ride a bike or motorcycle, go skiing, or play sports that could cause a head injury. Wear protective equipment when you play sports.  Wear a lifejacket when you are on a boat or doing water sports. © Copyright [employer] 2023 Information is for End User's use only and may not be sold, redistributed or otherwise used for commercial purposes. The above information is an  only. It is not intended as medical advice for individual conditions or treatments. Talk to your doctor, nurse or pharmacist before following any medical regimen to see if it is safe and effective for you. English

## 2025-02-14 ENCOUNTER — APPOINTMENT (OUTPATIENT)
Dept: URGENT CARE | Facility: CLINIC | Age: 65
End: 2025-02-14

## 2025-02-17 ENCOUNTER — TELEPHONE (OUTPATIENT)
Age: 65
End: 2025-02-17

## 2025-02-17 NOTE — TELEPHONE ENCOUNTER
Confirmation - Referral [id number]  Effective: 02/17/2025     Expires: 05/18/2025  Active  Referred From  [facility]  Specialty: [facility]  Group NPI: [id number]  Provider ID: [id number]  Tax ID: [id number]  [address] [place] [address]  Referred To  MILANA OG MD  Specialty: Dermatology  Group NPI: [id number]  Provider ID: [id number]  Tax ID: [id number]  This referral is valid at any location for the above group  Patient Info  VANI BENITEZ  [id number]  Female  1960  [address] [place] [address]  Clinical Information  Place of Service  Office  Service Type  Medical Care  Diagnosis  1D48.5 - neoplasm of uncertain behavior of skin  Procedures  199499 - unlisted evaluation and management service

## 2025-02-17 NOTE — TELEPHONE ENCOUNTER
Patient is requesting an insurance referral for the following specialty:      Test Name / Order Name: skin check for abnormal spot     DX Code: D48.5    Date Of Service: 2/20    Location/Facility Name/Address/Phone #: Dr Marylu calderon Phone [phone]    Location / Facility NPI: [id number]    [facility] Phone # To Reach The Patient:  [phone]

## 2025-03-16 DIAGNOSIS — I10 ESSENTIAL HYPERTENSION: ICD-10-CM

## 2025-03-17 RX ORDER — LOSARTAN POTASSIUM 50 MG/1
50 TABLET ORAL DAILY
Qty: 30 TABLET | Refills: 0 | Status: SHIPPED | OUTPATIENT
Start: 2025-03-17

## 2025-03-17 RX ORDER — HYDROCHLOROTHIAZIDE 12.5 MG/1
12.5 TABLET ORAL DAILY
Qty: 30 TABLET | Refills: 0 | Status: SHIPPED | OUTPATIENT
Start: 2025-03-17

## 2025-04-16 DIAGNOSIS — I10 ESSENTIAL HYPERTENSION: ICD-10-CM

## 2025-04-29 RX ORDER — LOSARTAN POTASSIUM 50 MG/1
50 TABLET ORAL DAILY
Qty: 30 TABLET | Refills: 0 | Status: SHIPPED | OUTPATIENT
Start: 2025-04-29

## 2025-04-29 RX ORDER — HYDROCHLOROTHIAZIDE 12.5 MG/1
12.5 TABLET ORAL DAILY
Qty: 30 TABLET | Refills: 0 | Status: SHIPPED | OUTPATIENT
Start: 2025-04-29

## 2025-04-29 NOTE — TELEPHONE ENCOUNTER
Pt will call back tomorrow.  Pt only wants to see Dr Frost and no appts open until May for physical.  Can she get prescription before appt.  She hasn't been seen since 4/2024.  Can Dr Frost fit her in somewhere.      She'll call back tomorrow cause she's not gonna be able to talk if someone calls her back.    Last physical 4/9/2024  Last appt 4/16/2024

## 2025-04-30 DIAGNOSIS — E78.2 MIXED HYPERLIPIDEMIA: ICD-10-CM

## 2025-04-30 DIAGNOSIS — Z13.29 SCREENING FOR ENDOCRINE, METABOLIC AND IMMUNITY DISORDER: ICD-10-CM

## 2025-04-30 DIAGNOSIS — Z13.0 SCREENING FOR DEFICIENCY ANEMIA: ICD-10-CM

## 2025-04-30 DIAGNOSIS — I10 ESSENTIAL HYPERTENSION: Primary | ICD-10-CM

## 2025-04-30 DIAGNOSIS — Z13.0 SCREENING FOR ENDOCRINE, METABOLIC AND IMMUNITY DISORDER: ICD-10-CM

## 2025-04-30 DIAGNOSIS — Z13.220 SCREENING FOR LIPID DISORDERS: ICD-10-CM

## 2025-04-30 DIAGNOSIS — R00.2 PALPITATION: ICD-10-CM

## 2025-04-30 DIAGNOSIS — Z13.228 SCREENING FOR ENDOCRINE, METABOLIC AND IMMUNITY DISORDER: ICD-10-CM

## 2025-04-30 DIAGNOSIS — Z13.1 SCREENING FOR DIABETES MELLITUS (DM): ICD-10-CM

## 2025-05-08 ENCOUNTER — VBI (OUTPATIENT)
Dept: ADMINISTRATIVE | Facility: OTHER | Age: 65
End: 2025-05-08

## 2025-05-08 NOTE — TELEPHONE ENCOUNTER
05/08/25 10:28 AM     Chart reviewed for CRC: Colonoscopy was/were not submitted to the patient's insurance.     Vahid Gonzalez MA   [facility]

## 2025-05-29 LAB
ALBUMIN SERPL-MCNC: 4.4 G/DL (ref 3.9–4.9)
ALP SERPL-CCNC: 81 IU/L (ref 44–121)
ALT SERPL-CCNC: 12 IU/L (ref 0–32)
AST SERPL-CCNC: 15 IU/L (ref 0–40)
BASOPHILS # BLD AUTO: 0.1 X10E3/UL (ref 0–0.2)
BASOPHILS NFR BLD AUTO: 1 %
BILIRUB SERPL-MCNC: 0.6 MG/DL (ref 0–1.2)
BUN SERPL-MCNC: 17 MG/DL (ref 8–27)
BUN/CREAT SERPL: 20 (ref 12–28)
CALCIUM SERPL-MCNC: 9.7 MG/DL (ref 8.7–10.3)
CHLORIDE SERPL-SCNC: 99 MMOL/L (ref 96–106)
CHOLEST SERPL-MCNC: 246 MG/DL (ref 100–199)
CO2 SERPL-SCNC: 24 MMOL/L (ref 20–29)
CREAT SERPL-MCNC: 0.87 MG/DL (ref 0.57–1)
EGFR: 74 ML/MIN/1.73
EOSINOPHIL # BLD AUTO: 0.1 X10E3/UL (ref 0–0.4)
EOSINOPHIL NFR BLD AUTO: 1 %
ERYTHROCYTE [DISTWIDTH] IN BLOOD BY AUTOMATED COUNT: 13 % (ref 11.7–15.4)
GLOBULIN SER-MCNC: 2.3 G/DL (ref 1.5–4.5)
GLUCOSE SERPL-MCNC: 95 MG/DL (ref 70–99)
HCT VFR BLD AUTO: 41.1 % (ref 34–46.6)
HDLC SERPL-MCNC: 88 MG/DL
HGB BLD-MCNC: 13.7 G/DL (ref 11.1–15.9)
IMM GRANULOCYTES # BLD: 0 X10E3/UL (ref 0–0.1)
IMM GRANULOCYTES NFR BLD: 0 %
LDLC SERPL CALC-MCNC: 145 MG/DL (ref 0–99)
LDLC/HDLC SERPL: 1.6 RATIO (ref 0–3.2)
LYMPHOCYTES # BLD AUTO: 2 X10E3/UL (ref 0.7–3.1)
LYMPHOCYTES NFR BLD AUTO: 29 %
MCH RBC QN AUTO: 30.4 PG (ref 26.6–33)
MCHC RBC AUTO-ENTMCNC: 33.3 G/DL (ref 31.5–35.7)
MCV RBC AUTO: 91 FL (ref 79–97)
MONOCYTES # BLD AUTO: 0.4 X10E3/UL (ref 0.1–0.9)
MONOCYTES NFR BLD AUTO: 5 %
NEUTROPHILS # BLD AUTO: 4.5 X10E3/UL (ref 1.4–7)
NEUTROPHILS NFR BLD AUTO: 64 %
PLATELET # BLD AUTO: 272 X10E3/UL (ref 150–450)
POTASSIUM SERPL-SCNC: 4.6 MMOL/L (ref 3.5–5.2)
PROT SERPL-MCNC: 6.7 G/DL (ref 6–8.5)
RBC # BLD AUTO: 4.5 X10E6/UL (ref 3.77–5.28)
SL AMB VLDL CHOLESTEROL CALC: 13 MG/DL (ref 5–40)
SODIUM SERPL-SCNC: 140 MMOL/L (ref 134–144)
TRIGL SERPL-MCNC: 78 MG/DL (ref 0–149)
TSH SERPL DL<=0.005 MIU/L-ACNC: 1.17 UIU/ML (ref 0.45–4.5)
WBC # BLD AUTO: 7.1 X10E3/UL (ref 3.4–10.8)

## 2025-05-31 ENCOUNTER — APPOINTMENT (EMERGENCY)
Dept: RADIOLOGY | Facility: HOSPITAL | Age: 65
End: 2025-05-31
Payer: COMMERCIAL

## 2025-05-31 ENCOUNTER — HOSPITAL ENCOUNTER (EMERGENCY)
Facility: HOSPITAL | Age: 65
Discharge: HOME | End: 2025-05-31
Attending: EMERGENCY MEDICINE | Admitting: EMERGENCY MEDICINE
Payer: COMMERCIAL

## 2025-05-31 ENCOUNTER — APPOINTMENT (EMERGENCY)
Dept: CT IMAGING | Facility: HOSPITAL | Age: 65
End: 2025-05-31
Payer: COMMERCIAL

## 2025-05-31 VITALS
TEMPERATURE: 98 F | HEIGHT: 66 IN | HEART RATE: 71 BPM | OXYGEN SATURATION: 99 % | BODY MASS INDEX: 32.14 KG/M2 | DIASTOLIC BLOOD PRESSURE: 61 MMHG | WEIGHT: 200 LBS | RESPIRATION RATE: 16 BRPM | SYSTOLIC BLOOD PRESSURE: 129 MMHG

## 2025-05-31 DIAGNOSIS — M25.572 ACUTE LEFT ANKLE PAIN: ICD-10-CM

## 2025-05-31 DIAGNOSIS — R93.89 ABNORMAL CT SCAN: ICD-10-CM

## 2025-05-31 DIAGNOSIS — S52.502A CLOSED FRACTURE OF LEFT DISTAL RADIUS: ICD-10-CM
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
## 2025-05-31 DIAGNOSIS — S00.03XA SCALP HEMATOMA, INITIAL ENCOUNTER: ICD-10-CM

## 2025-05-31 PROCEDURE — 73610 X-RAY EXAM OF ANKLE: CPT

## 2025-05-31 PROCEDURE — 29125 APPL SHORT ARM SPLINT STATIC: CPT

## 2025-05-31 PROCEDURE — 73090 X-RAY EXAM OF FOREARM: CPT

## 2025-05-31 PROCEDURE — 99284 EMERGENCY DEPT VISIT MOD MDM: CPT

## 2025-05-31 PROCEDURE — 72125 CT NECK SPINE W/O DYE: CPT

## 2025-05-31 PROCEDURE — 73060 X-RAY EXAM OF HUMERUS: CPT

## 2025-05-31 PROCEDURE — 70450 CT HEAD/BRAIN W/O DYE: CPT

## 2025-05-31 RX ORDER — IBUPROFEN 400 MG/1
400 TABLET, FILM COATED ORAL ONCE
Status: COMPLETED | OUTPATIENT
Start: 2025-05-31 | End: 2025-05-31

## 2025-05-31 RX ORDER — ACETAMINOPHEN 325 MG/1
650 TABLET ORAL ONCE
Status: COMPLETED | OUTPATIENT
Start: 2025-05-31 | End: 2025-05-31

## 2025-05-31 RX ORDER — NAPROXEN 375 MG/1
375 TABLET ORAL 2 TIMES DAILY WITH MEALS
Qty: 20 TABLET | Refills: 0 | Status: SHIPPED | OUTPATIENT
Start: 2025-05-31

## 2025-05-31 RX ORDER — OXYCODONE HYDROCHLORIDE 5 MG/1
5 TABLET ORAL ONCE
Refills: 0 | Status: COMPLETED | OUTPATIENT
Start: 2025-05-31 | End: 2025-05-31

## 2025-05-31 RX ADMIN — ACETAMINOPHEN 650 MG: 325 TABLET, FILM COATED ORAL at 12:56

## 2025-05-31 RX ADMIN — IBUPROFEN 400 MG: 400 TABLET, FILM COATED ORAL at 12:56

## 2025-05-31 RX ADMIN — OXYCODONE HYDROCHLORIDE 5 MG: 5 TABLET ORAL at 14:04

## 2025-06-01 NOTE — ED PROVIDER NOTES
Time reflects when diagnosis was documented in both MDM as applicable and the Disposition within this note       Time User Action Codes Description Comment    5/31/2025  2:35 PM Mahamed Fofana [W19.XXXA] Fall     5/31/2025  2:35 PM Mahamed Fofana [S52.502A] Closed fracture of left distal radius     5/31/2025  2:36 PM Mahamed Fofana [S00.03XA] Scalp hematoma, initial encounter     5/31/2025  2:38 PM Mahamed Fofana [M25.572] Acute left ankle pain     5/31/2025  2:41 PM Mahamed Fofana [R93.89] Abnormal CT scan     5/31/2025  2:41 PM Mahamed Fofana [R93.89] Abnormal CT scan Heterogeneous thyroid gland with probable bilateral thyroid nodules. The suspected hyperdense nodules measure less than 1.5 cm. Recommend correlation with thyroid function studies.          ED Disposition       ED Disposition   Discharge    Condition   Stable    Date/Time   Sat May 31, 2025  2:35 PM    Comment   Jennie Winter discharge to home/self care.                   Assessment & Plan       Medical Decision Making  65-year-old female presents today with concerns of fall down 10 steps.  No blood thinners.  Positive head strike.  Complaining of left wrist/forearm pain, left ankle pain and left forehead/head pain.  CT head and cervical spine.  XR to rule out fracture.  Plain radiograph(s) were reviewed by me, please see above documentation.  Splint applied to left forearm, sugartong. Please see procedure note.  Cts reassuring without traumatic injury aside from soft tissue swelling.  ------------------------------------------------------------  Strict return precautions discussed. Patient at time of discharge well-appearing in no acute distress, all questions answered. Patient agreeable to plan.  Patient's vitals, lab/imaging results, diagnosis, and treatment plan were discussed with the patient. All new/changed medications were discussed with patient, specifically, route of administration, how often and when to  "take, and where they can be picked up. Strict return precautions as well as close follow up with PCP was discussed with the patient and the patient was agreeable to my recommendations.  Patient verbally acknowledged understanding of the above communications. All labs reviewed and utilized in the medical decision making process (if labs were ordered). Portions of the record may have been created with voice recognition software.  Occasional wrong word or \"sound a like\" substitutions may have occurred due to the inherent limitations of voice recognition software.  Read the chart carefully and recognize, using context, where substitutions have occurred.        Amount and/or Complexity of Data Reviewed  Radiology: ordered and independent interpretation performed.    Risk  OTC drugs.  Prescription drug management.             Medications   oxyCODONE (ROXICODONE) IR tablet 5 mg (5 mg Oral Given 5/31/25 1404)   acetaminophen (TYLENOL) tablet 650 mg (650 mg Oral Given 5/31/25 1256)   ibuprofen (MOTRIN) tablet 400 mg (400 mg Oral Given 5/31/25 1256)       ED Risk Strat Scores                    No data recorded                            History of Present Illness       Chief Complaint   Patient presents with    Fall     Pt states that she fell down 10 steps at 11am this morning. Pt denies LOC. Pt denies thinners. Pt state that she hit the left side of body. Pt state that her left forearm is hurting.        Past Medical History[1]   Past Surgical History[2]   Family History[3]   Social History[4]   E-Cigarette/Vaping    E-Cigarette Use Never User       E-Cigarette/Vaping Substances    Nicotine No     THC No     CBD No     Flavoring No     Other No     Unknown No       I have reviewed and agree with the history as documented.     65 year old female presenting today with concerns of a fall.  She fell down about 10 steps at 11 AM this morning.  She is complaining of arm pain, facial pain/head pain and left ankle pain.  States " that the worst is her left wrist/forearm.  She does not take any blood thinners.  No nausea or vomiting.  No loss of consciousness.  No other symptoms.        Review of Systems   Constitutional:  Negative for chills and fever.   HENT:  Negative for ear pain and sore throat.    Eyes:  Negative for pain and visual disturbance.   Respiratory:  Negative for cough and shortness of breath.    Cardiovascular:  Negative for chest pain and palpitations.   Gastrointestinal:  Negative for abdominal pain and vomiting.   Genitourinary:  Negative for dysuria and hematuria.   Musculoskeletal:  Positive for arthralgias. Negative for back pain, neck pain and neck stiffness.   Skin:  Negative for color change and rash.   Neurological:  Positive for headaches. Negative for seizures, syncope and light-headedness.   All other systems reviewed and are negative.          Objective       ED Triage Vitals   Temperature Pulse Blood Pressure Respirations SpO2 Patient Position - Orthostatic VS   05/31/25 1219 05/31/25 1219 05/31/25 1219 05/31/25 1219 05/31/25 1219 05/31/25 1219   98 °F (36.7 °C) 65 135/65 18 97 % Sitting      Temp Source Heart Rate Source BP Location FiO2 (%) Pain Score    05/31/25 1219 05/31/25 1219 05/31/25 1219 -- 05/31/25 1256    Temporal Monitor Right arm  10 - Worst Possible Pain      Vitals      Date and Time Temp Pulse SpO2 Resp BP Pain Score FACES Pain Rating User   05/31/25 1446 -- -- -- -- -- 5 --    05/31/25 1430 -- 71 99 % 16 129/61 -- --    05/31/25 1404 -- -- -- -- -- 7 --    05/31/25 1400 -- 66 99 % 16 146/74 7 --    05/31/25 1300 -- 63 100 % 16 138/63 10 - Worst Possible Pain --    05/31/25 1256 -- -- -- -- -- 10 - Worst Possible Pain --    05/31/25 1219 98 °F (36.7 °C) 65 97 % 18 135/65 -- -- LD            Physical Exam  Vitals and nursing note reviewed.   Constitutional:       General: She is not in acute distress.     Appearance: She is well-developed. She is ill-appearing.   HENT:      Head:  Normocephalic and atraumatic.     Eyes:      Conjunctiva/sclera: Conjunctivae normal.       Cardiovascular:      Rate and Rhythm: Normal rate and regular rhythm.      Pulses: Normal pulses.      Heart sounds: Normal heart sounds. No murmur heard.  Pulmonary:      Effort: Pulmonary effort is normal. No respiratory distress.      Breath sounds: Normal breath sounds.   Chest:      Chest wall: No tenderness.   Abdominal:      Palpations: Abdomen is soft.      Tenderness: There is no abdominal tenderness. There is no right CVA tenderness or left CVA tenderness.     Musculoskeletal:         General: Signs of injury present. No swelling, tenderness (to anterior left ankle. To left forearm and wrist. Bruising to left side of face.) or deformity.      Cervical back: Neck supple.      Right lower leg: No edema.      Left lower leg: No edema.     Skin:     General: Skin is warm and dry.      Capillary Refill: Capillary refill takes less than 2 seconds.      Coloration: Skin is not jaundiced or pale.      Findings: Bruising present. No erythema, lesion or rash.     Neurological:      Mental Status: She is alert.     Psychiatric:         Mood and Affect: Mood normal.         Results Reviewed       None            CT head without contrast   Final Interpretation by Pallav N Shah, MD (05/31 3618)      No acute intracranial abnormality.      Left frontotemporal scalp swelling and hematoma without underlying calvarial fracture.                  Resident: MADELINE Reid I, the attending radiologist, have reviewed the images and agree with the final report above.      Workstation performed: [id number]         CT spine cervical without contrast   Final Interpretation by Pallav N Shah, MD (05/31 3156)      No cervical spine fracture or traumatic malalignment.      Degenerative changes of the cervical spine as described above.      Heterogeneous thyroid gland with probable bilateral thyroid nodules. The suspected hyperdense nodules  measure less than 1.5 cm. Recommend correlation with thyroid function studies.         Resident: MADELINE Reid I, the attending radiologist, have reviewed the images and agree with the final report above.      Workstation performed: [id number]         XR ankle 3+ views LEFT   ED Interpretation by Mahamed Fofana PA-C (05/31 2338)   No acute fracture or dislocation      XR humerus LEFT   ED Interpretation by Mahamed Fofana PA-C (05/31 1307)   No humerus fx.      XR forearm 2 views LEFT   ED Interpretation by Mahamed Fofana PA-C (05/31 1307)   Distal radial fracture, comminuted.          Splint application    Date/Time: 5/31/2025 7:25 AM    Performed by: Mahamed Fofana PA-C  Authorized by: Mahamed Fofana PA-C    Other Assisting Provider: No    Verbal consent obtained?: Yes    Risks and benefits: Risks, benefits and alternatives were discussed    Consent given by:  Patient  Patient states understanding of procedure being performed: Yes    Patient's understanding of procedure matches consent: Yes    Procedure consent matches procedure scheduled: Yes    Radiology Images displayed and confirmed. If images not available, report reviewed: Yes    Required items: Required blood products, implants, devices and special equipment available    Patient identity confirmed:  Verbally with patient  Pre-procedure details:     Sensation:  Normal  Procedure details:     Laterality:  Left    Location:  Arm    Arm:  L lower arm    Splint composition: static      Splint type:  Sugar tong    Supplies:  Cotton padding, Ortho-Glass and 3 layer wrap      ED Medication and Procedure Management   Prior to Admission Medications   Prescriptions Last Dose Informant Patient Reported? Taking?   hydroCHLOROthiazide 12.5 mg tablet   No No   Sig: TAKE 1 TABLET BY MOUTH EVERY DAY   losartan (COZAAR) 50 mg tablet   No No   Sig: TAKE 1 TABLET BY MOUTH EVERY DAY      Facility-Administered Medications: None     Discharge Medication List as of  5/31/2025  2:41 PM        START taking these medications    Details   naproxen (NAPROSYN) 375 mg tablet Take 1 tablet (375 mg total) by mouth 2 (two) times a day with meals, Starting Sat 5/31/2025, Normal           CONTINUE these medications which have NOT CHANGED    Details   hydroCHLOROthiazide 12.5 mg tablet TAKE 1 TABLET BY MOUTH EVERY DAY, Starting Tue 4/29/2025, Normal      losartan (COZAAR) 50 mg tablet TAKE 1 TABLET BY MOUTH EVERY DAY, Starting Tue 4/29/2025, Normal             ED SEPSIS DOCUMENTATION   Time reflects when diagnosis was documented in both MDM as applicable and the Disposition within this note       Time User Action Codes Description Comment    5/31/2025  2:35 PM Mahamed Fofana [W19.XXXA] Fall     5/31/2025  2:35 PM Mahamed Fofana [S52.502A] Closed fracture of left distal radius     5/31/2025  2:36 PM Mahamed Fofana [S00.03XA] Scalp hematoma, initial encounter     5/31/2025  2:38 PM Mahamed Fofana [M25.572] Acute left ankle pain     5/31/2025  2:41 PM Mahamed Fofana [R93.89] Abnormal CT scan     5/31/2025  2:41 PM Mahamed Fofana Modify [R93.89] Abnormal CT scan Heterogeneous thyroid gland with probable bilateral thyroid nodules. The suspected hyperdense nodules measure less than 1.5 cm. Recommend correlation with thyroid function studies.                   [1]   Past Medical History:  Diagnosis Date    Acute cystitis with hematuria 07/29/2020    Arthralgia of multiple joints 06/20/2021    COVID 12/17/2020    Epigastric pain 08/17/2021    Fall with injury 10/11/2022    Fracture of distal end of radius     Hematoma 10/11/2022    Herniated nucleus pulposus, L5-S1     Hyperlipidemia     Laryngitis 11/08/2019    Malignant melanoma of skin (HCC)     Resolved: 9/12/2017    Migraine     Oropharyngeal dysphagia 12/17/2019    Papanicolaou smear for cervical cancer screening 2019    Plantar fasciitis     Last Assessed: 6/30/2016   [2]   Past Surgical History:  Procedure Laterality  Date     SECTION      HYSTEROSCOPY      HYSTEROSCOPY W/ POLYPECTOMY      MAMMO (HISTORICAL) Bilateral 2021    GVH  birads  1  50-75% d    SKIN CANCER EXCISION Left     leg, melanoma in situ    TONSILLECTOMY     [3]   Family History  Problem Relation Name Age of Onset    Hypertension Mother      Pulmonary embolism Mother      Uterine cancer Mother      Irregular heart beat Mother          has pacemaker    Heart attack Father          age 42    Dementia Father      Coronary artery disease Father      Hypertension Sister      Atrial fibrillation Brother      Hypertension Brother      Atrial fibrillation Brother      No Known Problems Daughter      Melanoma Other GM     Lymphoma Family          Precursor B-cell Lymphoblastic of the bone    Cancer Family      Breast cancer Neg Hx      Ovarian cancer Neg Hx      Colon cancer Neg Hx     [4]   Social History  Tobacco Use    Smoking status: Never    Smokeless tobacco: Never   Vaping Use    Vaping status: Never Used   Substance Use Topics    Alcohol use: Yes     Comment: social drinker per [employer]    Drug use: No        Mahamed Fofana PA-C  [phone]

## 2025-06-02 ENCOUNTER — APPOINTMENT (OUTPATIENT)
Dept: RADIOLOGY | Facility: CLINIC | Age: 65
End: 2025-06-02
Attending: ORTHOPAEDIC SURGERY
Payer: COMMERCIAL

## 2025-06-02 ENCOUNTER — TELEPHONE (OUTPATIENT)
Dept: OBGYN CLINIC | Facility: CLINIC | Age: 65
End: 2025-06-02

## 2025-06-02 ENCOUNTER — OFFICE VISIT (OUTPATIENT)
Dept: OBGYN CLINIC | Facility: CLINIC | Age: 65
End: 2025-06-02
Payer: COMMERCIAL

## 2025-06-02 VITALS — WEIGHT: 200 LBS | HEIGHT: 66 IN | BODY MASS INDEX: 32.14 KG/M2

## 2025-06-02 DIAGNOSIS — R52 PAIN: ICD-10-CM

## 2025-06-02 DIAGNOSIS — T14.8XXA FRACTURE: ICD-10-CM

## 2025-06-02 DIAGNOSIS — S52.572A OTHER CLOSED INTRA-ARTICULAR FRACTURE OF DISTAL END OF LEFT RADIUS, INITIAL ENCOUNTER: Primary | ICD-10-CM

## 2025-06-02 PROCEDURE — 25600 CLTX DST RDL FX/EPHYS SEP WO: CPT | Performed by: ORTHOPAEDIC SURGERY

## 2025-06-02 PROCEDURE — 99204 OFFICE O/P NEW MOD 45 MIN: CPT | Performed by: ORTHOPAEDIC SURGERY

## 2025-06-02 PROCEDURE — 73110 X-RAY EXAM OF WRIST: CPT

## 2025-06-02 NOTE — TELEPHONE ENCOUNTER
Hello,    Please advise if a forced appointment can be accommodated for the patient:    Call back #: [phone]    Insurance: [facility]     Reason for appointment: : Closed fracture of left distal radius      Requested doctor and/or location: requesting soonest / [place].    Nothing available with Dr. Bridges.  Patient scheduled with Dr Call at the [facility] for 6/5.  If there is something sooner , please give patient a call.         Thank you.

## 2025-06-02 NOTE — PROGRESS NOTES
ORTHOPAEDIC HAND, WRIST, AND ELBOW OFFICE  VISIT     Name: Jennie Winter      : 1960      MRN: [id number]  Encounter Provider: Ankush Pimentel MD  Encounter Date: 2025   [facility]  :  Assessment & Plan  Pain  See below       Other closed intra-articular fracture of distal end of left radius, initial encounter  Left intra-articular nondisplaced distal radius fracture with maintenance of alignment on all views with the exception of the lateral with slight increase of dorsal angulation to neutral.  Patient will be treated nonoperatively well-padded short arm cast.  Follow-up next week cast on repeat x-rays.  Patient is aware if this displaces surgical intervention may be entertained.  Orders:  •  XR wrist 3+ vw left; Future  •  Fracture / Dislocation Treatment          General Discussions:       Operative Discussions:       History of Present Illness   HPI  Chief Complaint   Patient presents with   • Left Wrist - New Patient Visit     XR -        Jennie Winter is a 65 y.o. female who presents evaluation left distal radius fracture.  She fell 2 days ago down steps, complaining of pain, no numbness tingling fevers chills or constitutional symptoms.  She denies previous injuries.  She is right-hand dominant drives a schoolbus.      REVIEW OF SYSTEMS:  General: no fever, no chills  HEENT:  No loss of hearing or eyesight problems  Eyes:  No red eyes  Respiratory:  No coughing, shortness of breath or wheezing  Cardiovascular:  No chest pain, no palpitations  GI:  Abdomen soft nontender, denies nausea  Endocrine:  No muscle weakness, no frequent urination, no excessive thirst  Urinary:  No dysuria, no incontinence  Musculoskeletal: see HPI and PE  SKIN:  No skin rash, no dry skin  Neurological:  No headaches, no confusion  Psychiatric:  No suicide thoughts, no anxiety, no depression  Review of all other systems is  "negative    Objective   Ht 5' 6\" (1.676 m)   Wt 90.7 kg (200 lb)   BMI 32.28 kg/m²      General: well developed and well nourished, alert, oriented times 3, and appears comfortable  Psychiatric: Normal  HEENT: Trachea Midline, No torticollis  Cardiovascular: No discernable arrhythmia  Pulmonary: No wheezing or stridor  Abdomen: No rebound or guarding  Extremities: No peripheral edema  Skin: No masses, erythema, lacerations, fluctation, ulcerations  Neurovascular: Sensation Intact to the Median, Ulnar, Radial Nerve, Motor Intact to the Median, Ulnar, Radial Nerve, and Pulses Intact    Musculoskeletal exam:  Left wrist with positive swelling, no ecchymosis, no erythema, tenderness distal radius, no tenderness distal ulna, no tenderness forearm, radial head, or olecranon.  Neurologically intact left hand.      STUDIES REVIEWED:  AP, lateral, and oblique x-rays of the left wrist reviewed today by myself demonstrates intra-articular distal radius fracture without any significant angulation.      PROCEDURES PERFORMED:  Fracture / Dislocation Treatment    Date/Time: 6/2/2025 1:45 PM    Performed by: Ankush Pimentel MD  Authorized by: Ankush Pimentel MD    Patient Location:  [facility]    [place] Protocol:  Consent: Verbal consent obtained  Risks and benefits: risks, benefits and alternatives were discussed  Consent given by: patient  Radiology Images displayed and confirmed. If images not available, report reviewed: imaging studies available  Patient identity confirmed: verbally with patient    Injury location:  Wrist  Location details:  Left wrist  Injury type:  Fracture  Fracture type: distal radius    Fracture type: distal radius    Neurovascular status: Neurovascularly intact    Local anesthesia used?: No    General anesthesia used?: No    Manipulation performed?: No    Immobilization:  Cast  Cast type:  Short arm  Neurovascular status: Neurovascularly intact    Distal perfusion: normal    Neurological function: " normal    Range of motion: normal    Patient tolerance:  Patient tolerated the procedure well with no immediate complications    -

## 2025-06-03 ENCOUNTER — RESULTS FOLLOW-UP (OUTPATIENT)
Dept: FAMILY MEDICINE CLINIC | Facility: CLINIC | Age: 65
End: 2025-06-03

## 2025-06-04 ENCOUNTER — OFFICE VISIT (OUTPATIENT)
Dept: FAMILY MEDICINE CLINIC | Facility: CLINIC | Age: 65
End: 2025-06-04
Payer: COMMERCIAL

## 2025-06-04 VITALS
WEIGHT: 202 LBS | BODY MASS INDEX: 32.47 KG/M2 | OXYGEN SATURATION: 96 % | SYSTOLIC BLOOD PRESSURE: 110 MMHG | HEIGHT: 66 IN | DIASTOLIC BLOOD PRESSURE: 68 MMHG | TEMPERATURE: 96.8 F | HEART RATE: 77 BPM
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
## 2025-06-04 DIAGNOSIS — Z12.11 SCREEN FOR COLON CANCER: ICD-10-CM

## 2025-06-04 DIAGNOSIS — Z12.31 ENCOUNTER FOR SCREENING MAMMOGRAM FOR BREAST CANCER: ICD-10-CM

## 2025-06-04 DIAGNOSIS — S69.91XA INJURY OF RIGHT WRIST, INITIAL ENCOUNTER: ICD-10-CM

## 2025-06-04 DIAGNOSIS — I10 ESSENTIAL HYPERTENSION: ICD-10-CM

## 2025-06-04 DIAGNOSIS — S52.502D CLOSED FRACTURE OF DISTAL END OF LEFT RADIUS WITH ROUTINE HEALING, SUBSEQUENT ENCOUNTER: ICD-10-CM

## 2025-06-04 DIAGNOSIS — Z78.0 POSTMENOPAUSE: ICD-10-CM

## 2025-06-04 DIAGNOSIS — Z00.00 WELL ADULT EXAM: Primary | ICD-10-CM

## 2025-06-04 PROCEDURE — 99396 PREV VISIT EST AGE 40-64: CPT | Performed by: FAMILY MEDICINE

## 2025-06-04 PROCEDURE — 99213 OFFICE O/P EST LOW 20 MIN: CPT | Performed by: FAMILY MEDICINE

## 2025-06-04 RX ORDER — HYDROCHLOROTHIAZIDE 12.5 MG/1
12.5 TABLET ORAL DAILY
Qty: 30 TABLET | Refills: 5 | Status: SHIPPED | OUTPATIENT
Start: 2025-06-04

## 2025-06-04 NOTE — PATIENT INSTRUCTIONS
"Ok for Ibuprofen 400mg 3 times a day with food (up to 800mg  3 times a day)  Ok for tylenol as needed 500mg 2 tabs 3 times a day as needed with food  Follow up with oral surgeon   Schedule mammogram  Schedule dexa  Schedule colonoscopy   Xray right wrist - walk in       Patient Education     Routine physical for adults   The Basics   Written by the doctors and editors at [facility]   What is a physical? -- A physical is a routine visit, or \"check-up,\" with your doctor. You might also hear it called a \"wellness visit\" or \"preventive visit.\"  During each visit, the doctor will:   Ask about your physical and mental health   Ask about your habits, behaviors, and lifestyle   Do an exam   Give you vaccines if needed   Talk to you about any medicines you take   Give advice about your health   Answer your questions  Getting regular check-ups is an important part of taking care of your health. It can help your doctor find and treat any problems you have. But it's also important for preventing health problems.  A routine physical is different from a \"sick visit.\" A sick visit is when you see a doctor because of a health concern or problem. Since physicals are scheduled ahead of time, you can think about what you want to ask the doctor.  How often should I get a physical? -- It depends on your age and health. In general, for people age 21 years and older:   If you are younger than 50 years, you might be able to get a physical every 3 years.   If you are 50 years or older, your doctor might recommend a physical every year.  If you have an ongoing health condition, like diabetes or high blood pressure, your doctor will probably want to see you more often.  What happens during a physical? -- In general, each visit will include:   Physical exam - The doctor or nurse will check your height, weight, heart rate, and blood pressure. They will also look at your eyes and ears. They will ask about how you are feeling and whether you have " "any symptoms that bother you.   Medicines - It's a good idea to bring a list of all the medicines you take to each doctor visit. Your doctor will talk to you about your medicines and answer any questions. Tell them if you are having any side effects that bother you. You should also tell them if you are having trouble paying for any of your medicines.   Habits and behaviors - This includes:   Your diet   Your exercise habits   Whether you smoke, drink alcohol, or use drugs   Whether you are sexually active   Whether you feel safe at home  Your doctor will talk to you about things you can do to improve your health and lower your risk of health problems. They will also offer help and support. For example, if you want to quit smoking, they can give you advice and might prescribe medicines. If you want to improve your diet or get more physical activity, they can help you with this, too.   Lab tests, if needed - The tests you get will depend on your age and situation. For example, your doctor might want to check your:   Cholesterol   Blood sugar   Iron level   Vaccines - The recommended vaccines will depend on your age, health, and what vaccines you already had. Vaccines are very important because they can prevent certain serious or deadly infections.   Discussion of screening - \"Screening\" means checking for diseases or other health problems before they cause symptoms. Your doctor can recommend screening based on your age, risk, and preferences. This might include tests to check for:   Cancer, such as breast, prostate, cervical, ovarian, colorectal, prostate, lung, or skin cancer   Sexually transmitted infections, such as chlamydia and gonorrhea   Mental health conditions like depression and anxiety  Your doctor will talk to you about the different types of screening tests. They can help you decide which screenings to have. They can also explain what the results might mean.   Answering questions - The physical is a good " time to ask the doctor or nurse questions about your health. If needed, they can refer you to other doctors or specialists, too.  Adults older than 65 years often need other care, too. As you get older, your doctor will talk to you about:   How to prevent falling at home   Hearing or vision tests   Memory testing   How to take your medicines safely   Making sure that you have the help and support you need at home  All topics are updated as new evidence becomes available and our peer review process is complete.  This topic retrieved from [employer] on: May 02, 2024.  Topic 045725 Version 1.0  Release: 32.4.3 - C32.122  © 2024 UpToDate, Inc. and/or its affiliates. All rights reserved.  Consumer Information Use and Disclaimer   Disclaimer: This generalized information is a limited summary of diagnosis, treatment, and/or medication information. It is not meant to be comprehensive and should be used as a tool to help the user understand and/or assess potential diagnostic and treatment options. It does NOT include all information about conditions, treatments, medications, side effects, or risks that may apply to a specific patient. It is not intended to be medical advice or a substitute for the medical advice, diagnosis, or treatment of a health care provider based on the health care provider's examination and assessment of a patient's specific and unique circumstances. Patients must speak with a health care provider for complete information about their health, medical questions, and treatment options, including any risks or benefits regarding use of medications. This information does not endorse any treatments or medications as safe, effective, or approved for treating a specific patient. UpToDate, Inc. and its affiliates disclaim any warranty or liability relating to this information or the use thereof.The use of this information is governed by the Terms of Use, available at  https://www.woltersKivedauwer.com/en/know/clinical-effectiveness-terms. 2024© [employer] and its affiliates and/or licensors. All rights reserved.  Copyright   © 2024 [employer] and/or its affiliates. All rights reserved.

## 2025-06-04 NOTE — TELEPHONE ENCOUNTER
----- Message from Aleja Frost DO sent at 6/3/2025 11:47 AM EDT -----  Your cholesterol is still slightly elevated but much better than last time.   Your blood count is normal, red blood cells, white blood cells and platelets  Your sugar, liver and kidney function are all good  Sugar better than last year and kidney function better than last year.   Your thyroid level is normal   ----- Message -----  From: [facility] Lab Results In  Sent: 5/29/2025   6:06 AM EDT  To: Aleja Frost DO

## 2025-06-04 NOTE — PROGRESS NOTES
ADULT ANNUAL PHYSICAL  [facility]    NAME: Jennie Winter  AGE: 65 y.o. SEX: female  : 1960     DATE: 2025     Assessment and Plan:     1. Well adult exam  2. Injury of right wrist, initial encounter  Assessment & Plan:  She states she did not notice symptoms in right wrist as much since the left arm had been so painful  Orders:  -     XR wrist 3+ vw right; Future; Expected date: 2025  3. Fall with significant injury, subsequent encounter  4. Closed fracture of distal end of left radius with routine healing, subsequent encounter  Assessment & Plan:  Pt in sling, has follow up scheduled with ortho  5. Essential hypertension  Assessment & Plan:  Controlled on current medication, losartan, hctz  6. Screen for colon cancer  -     Ambulatory Referral to Gastroenterology; Future  7. Encounter for screening mammogram for breast cancer  -     Mammo screening bilateral w 3d and cad; Future; Expected date: 2025  8. Postmenopause  -     DXA bone density spine hip and pelvis; Future; Expected date: 2025      Immunizations and preventive care screenings were discussed with patient today. Appropriate education was printed on patient's after visit summary.    Counseling:  Dental Health: discussed importance of regular tooth brushing, flossing, and dental visits.  Exercise: the importance of regular exercise/physical activity was discussed. Recommend exercise 3-5 times per week for at least 30 minutes.       Depression Screening and Follow-up Plan: Patient was screened for depression during today's encounter. They screened negative with a PHQ-2 score of 0.          No follow-ups on file.     Chief Complaint:     Chief Complaint   Patient presents with    Physical Exam     Patient Is here fro a physical and to discuss results.      History of Present Illness:     Pt is here for a physical today.  Fell down 4 steps- seen in ed  Landed on left side  "with injury to left side of face and fracture of left arm.   Denies loss of consciousness. No headaches. Had ct head that was negative.   Now notice pain in right wrist           Review of Systems:     Review of Systems   Constitutional: Negative.  Negative for fatigue and fever.   HENT: Negative.     Eyes: Negative.    Respiratory: Negative.  Negative for cough.    Cardiovascular: Negative.    Gastrointestinal: Negative.    Endocrine: Negative.    Genitourinary: Negative.    Musculoskeletal:  Positive for arthralgias.        Right wrist pain  Left arm pain   Skin: Negative.    Allergic/Immunologic: Negative.    Neurological: Negative.    Psychiatric/Behavioral: Negative.        Past Medical History:     Past Medical History[1]   Past Surgical History:     Past Surgical History[2]   Social History:     Social History[3]   Family History:     Family History[4]   Current Medications:     Current Medications[5]   Allergies:     Allergies[6]   Physical Exam:     /68 (BP Location: Right arm, Patient Position: Sitting, Cuff Size: Large)   Pulse 77   Temp (!) 96.8 °F (36 °C) (Tympanic)   Ht 5' 6\" (1.676 m)   Wt 91.6 kg (202 lb)   SpO2 96%   BMI 32.60 kg/m²     Physical Exam  Vitals and nursing note reviewed.   Constitutional:       Appearance: She is well-developed.   HENT:      Head: Normocephalic and atraumatic.     Cardiovascular:      Rate and Rhythm: Normal rate and regular rhythm.      Heart sounds: Normal heart sounds.   Pulmonary:      Effort: Pulmonary effort is normal.      Breath sounds: Normal breath sounds.   Abdominal:      General: Bowel sounds are normal.      Palpations: Abdomen is soft.     Musculoskeletal:         General: Tenderness and signs of injury present. No swelling or deformity.      Comments: Tenderness right wrist. No ecchymosis. Good rom      Skin:     General: Skin is warm and dry.     Neurological:      Mental Status: She is alert and oriented to person, place, and time. "     Psychiatric:         Behavior: Behavior normal.         Thought Content: Thought content normal.         Judgment: Judgment normal.          DO YOAN Heath [facility]  Answers submitted by the patient for this visit:  Annual Physical (Submitted on 2025)  Diet/Nutrition choices: no special diet  Exercise choices: no formal exercise, walking  Sleep choices: sleeps well, 7-8 hours of sleep on average  Hearing choices: normal hearing bilateral ears, tinnitus  Vision choices: most recent eye exam < 1 year ago, wears glasses  Dental choices: regular dental visits, brushes teeth once daily  Do you have an advance directive/living will?: No  Do you have a durable power of  (POA)?: No         [1]   Past Medical History:  Diagnosis Date    Acute cystitis with hematuria 2020    Arthralgia of multiple joints 2021    COVID 2020    Epigastric pain 2021    Fall with injury 10/11/2022    Fracture of distal end of radius     Hematoma 10/11/2022    Herniated nucleus pulposus, L5-S1     Hyperlipidemia     Laryngitis 2019    Malignant melanoma of skin (HCC)     Resolved: 2017    Migraine     Oropharyngeal dysphagia 2019    Papanicolaou smear for cervical cancer screening 2019    Plantar fasciitis     Last Assessed: 2016   [2]   Past Surgical History:  Procedure Laterality Date     SECTION      HYSTEROSCOPY      HYSTEROSCOPY W/ POLYPECTOMY      MAMMO (HISTORICAL) Bilateral 2021    [facility]  birads  1  50-75% d    SKIN CANCER EXCISION Left     leg, melanoma in situ    TONSILLECTOMY     [3]   Social History  Socioeconomic History    Marital status: /Civil Union   Occupational History    Occupation:    Tobacco Use    Smoking status: Never    Smokeless tobacco: Never   Vaping Use    Vaping status: Never Used   Substance and Sexual Activity    Alcohol use: Yes     Comment: social drinker per [employer]    Drug use: No     Sexual activity: Yes     Partners: Male     Birth control/protection: Post-menopausal   Social History Narrative    Do you smoke marijuana? No    Domestic violence: No    Pets: Cats,Dogs    currently sexually active, no new partner in last year     Social Drivers of Health     Food Insecurity: No Food Insecurity (6/4/2025)    Nursing - Inadequate Food Risk Classification     Worried About Running Out of Food in the Last Year: Never true     Ran Out of Food in the Last Year: Never true   Transportation Needs: No Transportation Needs (6/4/2025)    PRAPARE - Transportation     Lack of Transportation (Medical): No     Lack of Transportation (Non-Medical): No   Housing Stability: Unknown (6/4/2025)    Housing Stability Vital Sign     Unable to Pay for Housing in the Last Year: No     Homeless in the Last Year: No   [4]   Family History  Problem Relation Name Age of Onset    Hypertension Mother      Pulmonary embolism Mother      Uterine cancer Mother      Irregular heart beat Mother          has pacemaker    Heart attack Father          age 42    Dementia Father      Coronary artery disease Father      Hypertension Sister      Atrial fibrillation Brother      Hypertension Brother      Atrial fibrillation Brother      No Known Problems Daughter      Melanoma Other GM     Lymphoma Family          Precursor B-cell Lymphoblastic of the bone    Cancer Family      Breast cancer Neg Hx      Ovarian cancer Neg Hx      Colon cancer Neg Hx     [5]   Current Outpatient Medications   Medication Sig Dispense Refill    naproxen (NAPROSYN) 375 mg tablet Take 1 tablet (375 mg total) by mouth 2 (two) times a day with meals 20 tablet 0    hydroCHLOROthiazide 12.5 mg tablet TAKE 1 TABLET BY MOUTH EVERY DAY 30 tablet 5    losartan (COZAAR) 50 mg tablet TAKE 1 TABLET BY MOUTH EVERY DAY 30 tablet 5     No current facility-administered medications for this visit.   [6] No Known Allergies

## 2025-06-04 NOTE — TELEPHONE ENCOUNTER
Aleja Frost, DO to Jennie Winter        6/3/25 11:47 AM  Your cholesterol is still slightly elevated but much better than last time.   Your blood count is normal, red blood cells, white blood cells and platelets  Your sugar, liver and kidney function are all good  Sugar better than last year and kidney function better than last year.   Your thyroid level is normal     Last read by Jennie Winter at 8:05AM on 6/4/2025.  Lipid Panel with Direct LDL reflex; CBC and differential; Comprehensive metabolic panel; TSH, 3rd generation with Free T4 reflex

## 2025-06-05 DIAGNOSIS — I10 ESSENTIAL HYPERTENSION: ICD-10-CM

## 2025-06-05 RX ORDER — LOSARTAN POTASSIUM 50 MG/1
50 TABLET ORAL DAILY
Qty: 30 TABLET | Refills: 5 | Status: SHIPPED | OUTPATIENT
Start: 2025-06-05

## 2025-06-08 PROBLEM — S69.91XA INJURY OF RIGHT WRIST: Status: ACTIVE | Noted: 2025-06-08

## 2025-06-08 PROBLEM — J20.9 ACUTE BRONCHITIS: Status: RESOLVED | Noted: 2024-04-16 | Resolved: 2025-06-08

## 2025-06-08 PROBLEM — S52.502D: Status: ACTIVE | Noted: 2025-06-08

## 2025-06-08 NOTE — ASSESSMENT & PLAN NOTE
She states she did not notice symptoms in right wrist as much since the left arm had been so painful

## 2025-06-09 ENCOUNTER — APPOINTMENT (OUTPATIENT)
Dept: RADIOLOGY | Facility: CLINIC | Age: 65
End: 2025-06-09
Attending: ORTHOPAEDIC SURGERY
Payer: COMMERCIAL

## 2025-06-09 ENCOUNTER — OFFICE VISIT (OUTPATIENT)
Dept: OBGYN CLINIC | Facility: CLINIC | Age: 65
End: 2025-06-09

## 2025-06-09 VITALS — BODY MASS INDEX: 32.47 KG/M2 | WEIGHT: 202 LBS | HEIGHT: 66 IN

## 2025-06-09 DIAGNOSIS — S52.572D OTHER CLOSED INTRA-ARTICULAR FRACTURE OF DISTAL END OF LEFT RADIUS WITH ROUTINE HEALING, SUBSEQUENT ENCOUNTER: Primary | ICD-10-CM

## 2025-06-09 DIAGNOSIS — S52.572A OTHER CLOSED INTRA-ARTICULAR FRACTURE OF DISTAL END OF LEFT RADIUS, INITIAL ENCOUNTER: ICD-10-CM

## 2025-06-09 PROCEDURE — 73110 X-RAY EXAM OF WRIST: CPT

## 2025-06-09 PROCEDURE — 99024 POSTOP FOLLOW-UP VISIT: CPT | Performed by: ORTHOPAEDIC SURGERY

## 2025-06-09 NOTE — PROGRESS NOTES
"    ORTHOPAEDIC HAND, WRIST, AND ELBOW OFFICE  VISIT     Name: Jennie Winter      : 1960      MRN: [id number]  Encounter Provider: Ankush Pimentel MD  Encounter Date: 2025   Encounter department: [facility]  :  Assessment & Plan  Other closed intra-articular fracture of distal end of left radius with routine healing, subsequent encounter  X-rays were reviewed with the patient which demonstrated similar alignment to her previous films  Cast was removed today due to the swelling going down and there being room in the cast  A new short arm cast was applied today  Cast precautions were reviewed  She will follow-up in 2 weeks for reevaluation and x-rays in the cast  Orders:  •  XR wrist 3+ vw left; Future          General Discussions:       Operative Discussions:       History of Present Illness   HPI  Chief Complaint   Patient presents with   • Left Wrist - Fracture       Jennie Winter is a 65 y.o. female who presents evaluation left distal radius fracture.  She fell on 2025  down steps, complaining of pain, no numbness tingling fevers chills or constitutional symptoms.  She denies previous injuries.  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 She will have some pain and discomfort.       REVIEW OF SYSTEMS:  General: no fever, no chills  HEENT:  No loss of hearing or eyesight problems  Eyes:  No red eyes  Respiratory:  No coughing, shortness of breath or wheezing  Cardiovascular:  No chest pain, no palpitations  GI:  Abdomen soft nontender, denies nausea  Endocrine:  No muscle weakness, no frequent urination, no excessive thirst  Urinary:  No dysuria, no incontinence  Musculoskeletal: see HPI and PE  SKIN:  No skin rash, no dry skin  Neurological:  No headaches, no confusion  Psychiatric:  No suicide thoughts, no anxiety, no depression  Review of all other systems is negative    Objective   Ht 5' 6\" " (1.676 m)   Wt 91.6 kg (202 lb)   BMI 32.60 kg/m²      General: well developed and well nourished, alert, oriented times 3, and appears comfortable  Psychiatric: Normal  HEENT: Trachea Midline, No torticollis  Cardiovascular: No discernable arrhythmia  Pulmonary: No wheezing or stridor  Abdomen: No rebound or guarding  Extremities: No peripheral edema  Skin: No masses, erythema, lacerations, fluctation, ulcerations  Neurovascular: Sensation Intact to the Median, Ulnar, Radial Nerve, Motor Intact to the Median, Ulnar, Radial Nerve, and Pulses Intact    Musculoskeletal exam:  Left wrist  Cast is in appropriate position with evidence of swelling that has subsided since her previous visit  Sensation is intact to light touch to the median, radial and ulnar nerve  Capillary refill less than 2 seconds      STUDIES REVIEWED:  AP, lateral, and oblique x-rays of the left wrist reviewed today by myself demonstrates intra-articular distal radius fracture without any significant angulation.      PROCEDURES PERFORMED:  Procedures  - No additional procedures were performed at today's visit    Scribe Attestation    I,:  Luis Parham PA-C am acting as a scribe while in the presence of the attending physician.:       I,:  Ankush Pimentel MD personally performed the services described in this documentation    as scribed in my presence.:

## 2025-06-23 ENCOUNTER — OFFICE VISIT (OUTPATIENT)
Dept: OBGYN CLINIC | Facility: CLINIC | Age: 65
End: 2025-06-23

## 2025-06-23 ENCOUNTER — APPOINTMENT (OUTPATIENT)
Dept: RADIOLOGY | Facility: CLINIC | Age: 65
End: 2025-06-23
Attending: ORTHOPAEDIC SURGERY
Payer: COMMERCIAL

## 2025-06-23 VITALS — HEIGHT: 66 IN | WEIGHT: 202 LBS | BODY MASS INDEX: 32.47 KG/M2

## 2025-06-23 DIAGNOSIS — S52.572D OTHER CLOSED INTRA-ARTICULAR FRACTURE OF DISTAL END OF LEFT RADIUS WITH ROUTINE HEALING, SUBSEQUENT ENCOUNTER: Primary | ICD-10-CM

## 2025-06-23 DIAGNOSIS — S52.572D OTHER CLOSED INTRA-ARTICULAR FRACTURE OF DISTAL END OF LEFT RADIUS WITH ROUTINE HEALING, SUBSEQUENT ENCOUNTER: ICD-10-CM

## 2025-06-23 PROCEDURE — 73110 X-RAY EXAM OF WRIST: CPT

## 2025-06-23 PROCEDURE — 99024 POSTOP FOLLOW-UP VISIT: CPT | Performed by: ORTHOPAEDIC SURGERY

## 2025-06-23 NOTE — PROGRESS NOTES
ORTHOPAEDIC HAND, WRIST, AND ELBOW OFFICE  VISIT     Name: Jennie Winter      : 1960      MRN: [id number]  Encounter Provider: Ankush Pimentel MD  Encounter Date: 2025   Encounter department: [facility]  :  Assessment & Plan  Other closed intra-articular fracture of distal end of left radius with routine healing, subsequent encounter  Xrays were obtained and reviewed today demonstrating 20 degrees dorsal angulation of left distal radius fracture.  Cast was removed today due to discomfort along the dorsal aspect of her wrist.  New short arm cast was applied today.  Cast precautions were reviewed.  She will be in short arm cast for an additional 3 weeks.  Patient will follow-up in 3 weeks for cast off and repeat left wrist x-rays.  Orders:  •  XR wrist 3+ vw left; Future  •  Cast application          General Discussions:     Fracture - Nonoperative Care: The physiology of a fractured bone was discussed with the patient today.  With nondisplaced or minimally displaced fractures, conservative treatment often results in a functional recovery.  Typically, these fractures can be immobilized in either a cast or splint depending on the pattern.  Radiographs are typically taken at intervals throughout the fracture healing to ensure that muscular forces do not cause loss of reduction or alignment.  If the fracture loses its alignment, surgical intervention may be required to stabilize it.  Medical conditions such as diabetes, osteoporosis, vitamin D deficiency, and a history of or exposure to smoking may delay or prevent fracture healing.        History of Present Illness   HPI  Chief Complaint   Patient presents with   • Left Wrist - Fracture, Follow-up       Jennie Winter is a 65 y.o. female who presents evaluation left distal radius fracture.  She fell on  25  down steps, complaining of pain, no numbness tingling fevers chills or constitutional  "symptoms.  She denies previous injuries.  She is right-hand dominant drives a schoolbus.  Patient was last seen 6/9/2025 where patient had new short arm cast applied. She states she has occasional discomfort over left wrist. Patient states she tweaked her wrist a couple days ago causing some discomfort within her wrist over.  She states she has been compliant with cast.       REVIEW OF SYSTEMS:  General: no fever, no chills  HEENT:  No loss of hearing or eyesight problems  Eyes:  No red eyes  Respiratory:  No coughing, shortness of breath or wheezing  Cardiovascular:  No chest pain, no palpitations  GI:  Abdomen soft nontender, denies nausea  Endocrine:  No muscle weakness, no frequent urination, no excessive thirst  Urinary:  No dysuria, no incontinence  Musculoskeletal: see HPI and PE  SKIN:  No skin rash, no dry skin  Neurological:  No headaches, no confusion  Psychiatric:  No suicide thoughts, no anxiety, no depression  Review of all other systems is negative    Objective   Ht 5' 6\" (1.676 m)   Wt 91.6 kg (202 lb)   BMI 32.60 kg/m²      General: well developed and well nourished, alert, oriented times 3, and appears comfortable  Psychiatric: Normal  HEENT: Trachea Midline, No torticollis  Cardiovascular: No discernable arrhythmia  Pulmonary: No wheezing or stridor  Abdomen: No rebound or guarding  Extremities: No peripheral edema  Skin: No masses, erythema, lacerations, fluctation, ulcerations  Neurovascular: Sensation Intact to the Median, Ulnar, Radial Nerve, Motor Intact to the Median, Ulnar, Radial Nerve, and Pulses Intact    Musculoskeletal exam:  Left wrist:  Cast is in appropriate position   Sensation is intact to light touch to the median, radial and ulnar nerve  Capillary refill less than 2 seconds      STUDIES REVIEWED:  AP, lateral, and oblique x-rays of the left wrist reviewed today by myself demonstrates intra-articular distal radius fracture with 20 degrees dorsal angulation.     PROCEDURES " PERFORMED:  Cast application    Date/Time: 6/23/2025 10:30 AM    Performed by: Ankush Pimentel MD  Authorized by: Ankush Pimentel MD    Other Assisting Provider: No    Verbal consent obtained?: Yes    Consent given by:  Patient  Patient states understanding of procedure being performed: Yes    Site marked: Yes    Patient identity confirmed:  Verbally with patient  Pre-procedure details:     Sensation:  Normal  Procedure details:     Laterality:  Left    Location:  Wrist    Wrist:  L wrist    Cast type:  Short arm    Supplies:  Cotton padding, 2 layer wrap and fiberglass  Post-procedure details:     Pain:  Unchanged    Sensation:  Normal    Patient tolerance of procedure:  Tolerated well, no immediate complications    -          Scribe Attestation    I,:  Eliud Obregon am acting as a scribe while in the presence of the attending physician.:       I,:  Ankush Pimentel MD personally performed the services described in this documentation    as scribed in my presence.:

## 2025-06-25 ENCOUNTER — TELEPHONE (OUTPATIENT)
Age: 65
End: 2025-06-25

## 2025-06-25 NOTE — TELEPHONE ENCOUNTER
Called patient; LMOM requesting a call back.  Received Neurology referral for patient; referred by Dr. Eleuterio Guadalupe for TMJ pain (referral/records scanned in media 6/24/25).    Thank you   Patient presenting for itching  skin clear, no rash  will obtain lab, assess bun, bili. ed obs and reassess

## 2025-06-25 NOTE — TELEPHONE ENCOUNTER
Patient returning call to schedule New Patient appointment from faxed in referral.    Patient was triaged and advised we will call her back once scheduling recommendations are received.

## 2025-06-26 ENCOUNTER — TELEPHONE (OUTPATIENT)
Age: 65
End: 2025-06-26

## 2025-06-26 NOTE — TELEPHONE ENCOUNTER
Pt will be needing AMB referral. Once completed, please route to [facility] Procedure Prior Authorizations for insurance referral.      Patient is requesting an insurance referral for the following specialty:      Test Name / Order Name:     DX Code: ZO0.8    Date Of Service: 7/7    Location/Facility Name/Address/Phone #:   Pebbles Montoya MD  [address]   [address]  [place] [address]  Phone: [phone]    Location / Facility NPI: [id number]    [facility] Phone # To Reach The Patient: [phone]

## 2025-07-08 PROBLEM — Z00.00 WELL ADULT EXAM: Status: RESOLVED | Noted: 2018-09-05 | Resolved: 2025-07-08

## 2025-07-14 ENCOUNTER — APPOINTMENT (OUTPATIENT)
Dept: RADIOLOGY | Facility: CLINIC | Age: 65
End: 2025-07-14
Attending: ORTHOPAEDIC SURGERY
Payer: COMMERCIAL

## 2025-07-14 ENCOUNTER — OFFICE VISIT (OUTPATIENT)
Dept: OBGYN CLINIC | Facility: CLINIC | Age: 65
End: 2025-07-14

## 2025-07-14 VITALS — BODY MASS INDEX: 32.47 KG/M2 | WEIGHT: 202 LBS | HEIGHT: 66 IN

## 2025-07-14 DIAGNOSIS — S52.572D OTHER CLOSED INTRA-ARTICULAR FRACTURE OF DISTAL END OF LEFT RADIUS WITH ROUTINE HEALING, SUBSEQUENT ENCOUNTER: Primary | ICD-10-CM

## 2025-07-14 DIAGNOSIS — S52.572D OTHER CLOSED INTRA-ARTICULAR FRACTURE OF DISTAL END OF LEFT RADIUS WITH ROUTINE HEALING, SUBSEQUENT ENCOUNTER: ICD-10-CM

## 2025-07-14 PROCEDURE — 73110 X-RAY EXAM OF WRIST: CPT

## 2025-07-14 PROCEDURE — 99024 POSTOP FOLLOW-UP VISIT: CPT | Performed by: ORTHOPAEDIC SURGERY

## 2025-07-14 NOTE — ASSESSMENT & PLAN NOTE
Xrays were obtained and reviewed today demonstrating 20 degrees dorsal angulation of left distal radius fracture with bony callus healing, clinical healed.  She will be placed in cock up wrist splint today, wear while in public, remove at home and night and work on gentle range of motion, avoid any strenuous lifting with left wrist  Start OT in 2 weeks for motion, strength  Reviewed again due to fracture she will have bony deformity and may lack some end range flexion and extension but this will no impair overall function  She will follow up in 6 weeks for ROM check

## 2025-07-14 NOTE — PROGRESS NOTES
ORTHOPAEDIC HAND, WRIST, AND ELBOW OFFICE  VISIT     Name: Jennie Winter      : 1960      MRN: [id number]  Encounter Provider: Ankush Pimentel MD  Encounter Date: 2025   Encounter department: [facility]  :  Assessment & Plan  Other closed intra-articular fracture of distal end of left radius with routine healing, subsequent encounter    Xrays were obtained and reviewed today demonstrating 20 degrees dorsal angulation of left distal radius fracture with bony callus healing, clinical healed.  She will be placed in cock up wrist splint today, wear while in public, remove at home and night and work on gentle range of motion, avoid any strenuous lifting with left wrist  Start OT in 2 weeks for motion, strength  Reviewed again due to fracture she will have bony deformity and may lack some end range flexion and extension but this will no impair overall function  She will follow up in 6 weeks for ROM check              General Discussions:     Fracture - Nonoperative Care: The physiology of a fractured bone was discussed with the patient today.  With nondisplaced or minimally displaced fractures, conservative treatment often results in a functional recovery.  Typically, these fractures can be immobilized in either a cast or splint depending on the pattern.  Radiographs are typically taken at intervals throughout the fracture healing to ensure that muscular forces do not cause loss of reduction or alignment.  If the fracture loses its alignment, surgical intervention may be required to stabilize it.  Medical conditions such as diabetes, osteoporosis, vitamin D deficiency, and a history of or exposure to smoking may delay or prevent fracture healing.        History of Present Illness   HPI  Chief Complaint   Patient presents with   • Left Wrist - Follow-up, Fracture       Jennie Winter is a 65 y.o. female who presents evaluation left distal radius fracture.  " She fell on  5/31/25  down steps. She denies previous injuries.  She is right-hand dominant drives a schoolbus.  Patient had cast removed at last appt and new cast applied. No issues with new cast. Pain is well controlled. General ache and stiffness.  Denies any numbness or tingling. Here for cast off and imaging.       REVIEW OF SYSTEMS:  General: no fever, no chills  HEENT:  No loss of hearing or eyesight problems  Eyes:  No red eyes  Respiratory:  No coughing, shortness of breath or wheezing  Cardiovascular:  No chest pain, no palpitations  GI:  Abdomen soft nontender, denies nausea  Endocrine:  No muscle weakness, no frequent urination, no excessive thirst  Urinary:  No dysuria, no incontinence  Musculoskeletal: see HPI and PE  SKIN:  No skin rash, no dry skin  Neurological:  No headaches, no confusion  Psychiatric:  No suicide thoughts, no anxiety, no depression  Review of all other systems is negative    Objective   Ht 5' 6\" (1.676 m)   Wt 91.6 kg (202 lb)   BMI 32.60 kg/m²      General: well developed and well nourished, alert, oriented times 3, and appears comfortable  Psychiatric: Normal  HEENT: Trachea Midline, No torticollis  Cardiovascular: No discernable arrhythmia  Pulmonary: No wheezing or stridor  Abdomen: No rebound or guarding  Extremities: No peripheral edema  Skin: No masses, erythema, lacerations, fluctation, ulcerations  Neurovascular: Sensation Intact to the Median, Ulnar, Radial Nerve, Motor Intact to the Median, Ulnar, Radial Nerve, and Pulses Intact    Musculoskeletal exam:  Left wrist:  Cast removed today  None TTP over distal radius, mild swelling ulnar side of wrist  Able to make composite fist  Bony deformity over distal radius  Sensation is intact to light touch to the median, radial and ulnar nerve  Capillary refill less than 2 seconds      STUDIES REVIEWED:  AP, lateral, and oblique x-rays of the left wrist reviewed today by myself demonstrates intra-articular distal radius " fracture with less than 20 degrees dorsal angulation with continued bony callus healing, unchanged position from previous imaging.     PROCEDURES PERFORMED:  No procedures performed  -          Scribe Attestation    I,:  Matthew Lopez am acting as a scribe while in the presence of the attending physician.:       I,:  Ankush Pimentel MD personally performed the services described in this documentation    as scribed in my presence.:

## 2025-07-16 ENCOUNTER — TELEPHONE (OUTPATIENT)
Dept: FAMILY MEDICINE CLINIC | Facility: CLINIC | Age: 65
End: 2025-07-16

## 2025-07-16 NOTE — TELEPHONE ENCOUNTER
----- Message from Daisy HORTA sent at 7/15/2025  5:35 PM EDT -----  Regarding: FW: OP Rehab Insurance Referral Request  ExportCopy Referral  Confirmation - Referral [id number]  Effective: 07/15/2025     Expires: 10/13/2025  Active  Referred From  [facility]  Specialty: [facility]  Group NPI: [id number]  Provider ID: [id number]  Tax ID: [id number]  [address] [place] [address]  Referred To  [facility]  Specialty: [facility]  Group NPI: [id number]  Provider ID: [id number]  Tax ID: [id number]  This referral is valid at any location for the above group  Patient Info  VANI BENITEZ  [id number]  Female  1960  [address] [place] [id number]  Clinical Information  Place of Service  Office  Service Type  Medical Care  Diagnosis  1S52.572D - other intraarticular fracture of lower end of left radius, subsequent encounter for closed fracture with routine healing  Procedures  199499 - unlisted evaluation and management service  ----- Message -----  From: Lolis Patel  Sent: 7/15/2025   4:58 PM EDT  To: Anju Schwartz; Jacquelyn Gonzalez; Ly Peralta; Pa#  Subject: OP Rehab Insurance Referral Request              Patient is in need of insurance referral for OT  Rendering NPI:[employer]- [id number]  Ordering Physician:Dr. Pimentel   Diagnosis:Other closed intra-articular fracture of distal end of left radius with routine healing, subsequent encounter [S52.242D]  Date of visit:07/18/2025    Rendering Office Phone Number:[phone]  Rendering Office Fax Number:[phone]

## 2025-07-18 ENCOUNTER — EVALUATION (OUTPATIENT)
Dept: OCCUPATIONAL THERAPY | Facility: CLINIC | Age: 65
End: 2025-07-18
Attending: ORTHOPAEDIC SURGERY
Payer: COMMERCIAL

## 2025-07-18 DIAGNOSIS — S52.572D OTHER CLOSED INTRA-ARTICULAR FRACTURE OF DISTAL END OF LEFT RADIUS WITH ROUTINE HEALING, SUBSEQUENT ENCOUNTER: ICD-10-CM

## 2025-07-18 PROCEDURE — 97110 THERAPEUTIC EXERCISES: CPT | Performed by: OCCUPATIONAL THERAPIST

## 2025-07-18 PROCEDURE — 97165 OT EVAL LOW COMPLEX 30 MIN: CPT | Performed by: OCCUPATIONAL THERAPIST

## 2025-07-18 PROCEDURE — 97140 MANUAL THERAPY 1/> REGIONS: CPT | Performed by: OCCUPATIONAL THERAPIST

## 2025-07-18 NOTE — PROGRESS NOTES
OT Evaluation     Today's date: 2025  Patient name: Jennie Winter  : 1960  MRN: [id number]  Referring provider: Ankush Pimentel MD  Dx:   Encounter Diagnosis     ICD-10-CM    1. Other closed intra-articular fracture of distal end of left radius with routine healing, subsequent encounter  S52.572D Ambulatory Referral to PT/OT Hand Therapy                     Assessment  Impairments: abnormal or restricted ROM, impaired physical strength, lacks appropriate home exercise program, pain with function and unable to perform ADL  Functional limitations: limited with L for gripping , lifting , self care , meal prep  Symptom irritability: moderate    Assessment details: Jennie presents s/p immobilization of L wrist for distal radius fracture  from a fall in May. She has limited wrist ROM . She has mild edema . She has pain  with use. She has tender DRUJ , and ECU . She is limited with use of L for self care and housekeeping . She works as a  . She lives with her , She can begin PROM and strengthening in 2 wks.   Understanding of Dx/Px/POC: good     Prognosis: good    Goals  STG-1 wk  1- I with HEP  2- improve pain to 5/10  3-improve wrist ROM - 20    LTG- 8 wks  1- I ADL - self care, meal prep  2- improve wrist e/f-  to 60/60  3- L  40#  4- L pinches 8#  5- achieve projected FOTO score      Plan  Patient would benefit from: skilled occupational therapy and OT eval  Planned modality interventions: cryotherapy and thermotherapy: hydrocollator packs    Planned therapy interventions: activity modification, ADL retraining, IASTM, joint mobilization, kinesiology taping, manual therapy, massage, home exercise program, strengthening, stretching, compression, functional ROM exercises and therapeutic exercise    Frequency: 2x week  Duration in weeks: 8  Plan of Care beginning date: 2025  Treatment plan discussed with: patient        Subjective Evaluation    History of Present  Illness  Date of onset: 2025  Mechanism of injury: trauma  Mechanism of injury: Jennie reports falling on steps on 25. She  was casted 6 wks. She is wearing  a wrist splint .   Quality of life: good    Patient Goals  Patient goals for therapy: decreased edema, decreased pain, increased strength, independence with ADLs/IADLs and increased motion  Patient goal: regain  use of L  Pain  Current pain ratin  At best pain ratin  At worst pain ratin  Location: L wrist  Quality: dull ache and sharp  Relieving factors: rest and support  Aggravating factors: lifting (ROM ,  self care, housekeeping)  Progression: improved    Social Support  Steps to enter house: yes  Stairs in house: yes   Lives in: multiple-level home  Lives with: spouse    Employment status: working  Hand dominance: right    Treatments  Current treatment: immobilization        Objective     Palpation     Additional Palpation Details  Tender DRUJ , TFCC, ECU     Neurological Testing     Additional Neurological Details  C/o burning palm     Active Range of Motion     Left Elbow   Forearm supination: 65 degrees   Forearm pronation: 72 degrees     Left Wrist   Wrist flexion: 24 degrees   Wrist extension: 40 degrees   Radial deviation: 20 degrees   Ulnar deviation: 18 degrees     Left Thumb   Kapandji score: 8 degrees    Opposition: WNL    Additional Active Range of Motion Details  Digit ROM to DPC    Passive Range of Motion     Additional Passive Range of Motion Details  To be assessed in 2 wks    Strength/Myotome Testing     Right Wrist/Hand      (2nd hand position)     Trial 1: 68    Thumb Strength   Key/Lateral Pinch     Trial 1: 18  Palmar/Three-Point Pinch     Trial 1: 18    Additional Strength Details  L to be assessed in 2 wks    Tests     Right Wrist/Hand   Positive intrinsic muscle tightness.     Swelling     Left Wrist/Hand   Circumference wrist: 8 cm    Right Wrist/Hand   Circumference wrist: 16.5 cm            Daily  Treatment Diary     Precautions: fracture   CO-MORBIDITIES: hx melanoma   HEP ACCESS CODE:   FOTO Completed On:     POC Expires Reeval for Medicare to be completed  Auth Expiration Date PT/OT/STVisit Limit   8/31/25 By visit  10/31/25 30     Completed on visit                  Auth Status DATE 7/18        Approved Visit # 1         Remaining 29        MANUAL THERAPY         IASTM  L wrist/hand 4m        Retrograde  4m        Intrinsic stretches 4m                                   THERAPEUTIC EXERCISE HEP 4xday        AROM L wrist   4m        AROM  P/S  2m        Wrist ROM w/cone  30x                                                                                                                                NEUROMUSCULAR REEDUCATION                                                                                                                                                       THERAPEUTIC ACTIVITY          Pegs with    30x                                                                                        MODALITIES         MH  5m        CP  5m

## 2025-07-23 ENCOUNTER — TELEPHONE (OUTPATIENT)
Dept: NEUROLOGY | Facility: CLINIC | Age: 65
End: 2025-07-23

## 2025-07-23 ENCOUNTER — OFFICE VISIT (OUTPATIENT)
Dept: OCCUPATIONAL THERAPY | Facility: CLINIC | Age: 65
End: 2025-07-23
Attending: ORTHOPAEDIC SURGERY
Payer: COMMERCIAL

## 2025-07-23 DIAGNOSIS — S52.572D OTHER CLOSED INTRA-ARTICULAR FRACTURE OF DISTAL END OF LEFT RADIUS WITH ROUTINE HEALING, SUBSEQUENT ENCOUNTER: Primary | ICD-10-CM

## 2025-07-23 PROCEDURE — 97140 MANUAL THERAPY 1/> REGIONS: CPT | Performed by: OCCUPATIONAL THERAPIST

## 2025-07-23 PROCEDURE — 97110 THERAPEUTIC EXERCISES: CPT | Performed by: OCCUPATIONAL THERAPIST

## 2025-07-23 NOTE — PROGRESS NOTES
Daily Note     Today's date: 2025  Patient name: Jennie Winter  : 1960  MRN: [id number]  Referring provider: Ankush Pimentel MD  Dx:   Encounter Diagnosis     ICD-10-CM    1. Other closed intra-articular fracture of distal end of left radius with routine healing, subsequent encounter  S52.572D                      Subjective: My wrist is feeling better.      Objective: See treatment diary below      Assessment: Tolerated treatment well. Patient wrist mobility improving.  No pain with function.      Plan: Continue per plan of care.      Daily Treatment Diary     Precautions: fracture   CO-MORBIDITIES: hx melanoma   HEP ACCESS CODE:   FOTO Completed On:     POC Expires Reeval for Medicare to be completed  Auth Expiration Date PT/OT/STVisit Limit   25 By visit  10/31/25 30     Completed on visit                  Auth Status DATE        Approved Visit # 1 2        Remaining 29 28       MANUAL THERAPY         IASTM  L wrist/hand 4m 4m       Retrograde  4m 4m       Intrinsic stretches 4m 4m                                  THERAPEUTIC EXERCISE HEP 4xday        AROM L wrist   4m 4m       AROM  P/S  2m 2m       Wrist ROM w/cone  30x 30x       Wrist maze   x10       Light gripping   YPW 2x30                                                                                                           NEUROMUSCULAR REEDUCATION                                                                                                                                                       THERAPEUTIC ACTIVITY          Pegs with    30x        Pinch w rotation   R 2 sets                                                                             MODALITIES         MH  5m 5m       CP  5m 5m

## 2025-07-24 ENCOUNTER — OFFICE VISIT (OUTPATIENT)
Dept: NEUROLOGY | Facility: CLINIC | Age: 65
End: 2025-07-24
Payer: COMMERCIAL

## 2025-07-24 VITALS
HEART RATE: 85 BPM | OXYGEN SATURATION: 98 % | WEIGHT: 203 LBS | RESPIRATION RATE: 18 BRPM | HEIGHT: 66 IN | BODY MASS INDEX: 32.62 KG/M2 | DIASTOLIC BLOOD PRESSURE: 78 MMHG | SYSTOLIC BLOOD PRESSURE: 130 MMHG

## 2025-07-24 DIAGNOSIS — R68.84 CHRONIC JAW PAIN: Primary | ICD-10-CM

## 2025-07-24 DIAGNOSIS — G89.29 CHRONIC JAW PAIN: Primary | ICD-10-CM

## 2025-07-24 PROCEDURE — 99204 OFFICE O/P NEW MOD 45 MIN: CPT | Performed by: PSYCHIATRY & NEUROLOGY

## 2025-07-24 RX ORDER — BACLOFEN 10 MG/1
10 TABLET ORAL
Qty: 14 TABLET | Refills: 0 | Status: SHIPPED | OUTPATIENT
Start: 2025-07-24

## 2025-07-24 NOTE — PROGRESS NOTES
Name: Jennie Winter      : 1960      MRN: [id number]  Encounter Provider: Ana Delaney MD  Encounter Date: 2025   Encounter department: [facility]  :  Assessment & Plan  Chronic jaw pain  Patient with 4 years chronic jaw pain per dentist consistent with TMJ but no clear dental cause. Given muscle soreness endorsed in the face and possibility of facial spasm, need to rule out hypocalcemia, myopathy, and trismus. Could consider structural issue such as Eagle syndrome. No signs on exam consistent with trigeminal neuralgia, GCA, or autonomic cephalgias. Will need to check brain imaging with trigeminal protocol to be sure though given difference in sensation on face. 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     Check ionized calcium, PTH, ESR, Crp, CK, aldolase  Check MRI brain w/wo contrast trigeminal protocol  Trial baclofen 10 mg qHS for 14 days  [facility] referral  Follow up in 4 months    Orders:    Calcium, ionized; Future    PTH, intact; Future    Sedimentation rate, automated; Future    C-reactive protein; Future    CK; Future    Aldolase; Future    MRI brain cavernous / trigeminal wo and w contrast; Future    baclofen 10 mg tablet; Take 1 tablet (10 mg total) by mouth daily at bedtime    Ambulatory Referral to Oral Maxillofacial Surgery; Future          History of Present Illness   66 yo female here after being referred by her dentist. She has been having issues with jaw since  and based on xray was recommended to see neurology as no dental cause of her pain was identified. Her issue has always been the left jaw. Initially it was just that it would get locked up and that she could hear it clicking and popping every time she opened and closed her mouth, but she became frustrated that she could not fully open her jaw to eat items like a hoagie.In the past year, she has experienced  "much more pain. She feels a stabbing pain when she opens her jaw that resolves when she closes her jaw. She is unsure why this seems to flare up for a few weeks at a time, possibly from avoiding chewing on that side she feels may have impact. She has made a point to eat on the right side of her jaw as much as possible. She has also noticed increased sensitivity to temperature on the left teeth. She has had a dull ear ache as well for a few months that comes and goes without any association to opening and closing jaw. She has noticed some tinnitus in both ears as well, but she has had this for years. She denies pain with touching cheek, brushing teeth, or scalp sensitivity. She does not automatically wake up with the pain and confirms the pain radiates to her mandible. She denies any triggers or mitigating factors, any medication that has helped so far, eye tearing, nose running, and blurry vision. She does not believe she grinds her teeth and denies chewing a lot of gum. She feels her jaw muscles are tighter when she opens her jaw up. The specialist dentist recommended trigger point injections, voltaren gel, and muscle relaxants. 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 She is open to trying it now and will decide whether she wants MRI or not.          Review of Systems   HENT:  Positive for dental problem and tinnitus. Negative for congestion and hearing loss.    Eyes:  Negative for pain and visual disturbance.   Neurological:  Negative for dizziness, tremors, seizures, syncope, facial asymmetry, speech difficulty, weakness, light-headedness, numbness and headaches.    I have personally reviewed the MA's review of systems and made changes as necessary.         Objective   /78 (BP Location: Left arm, Patient Position: Sitting, Cuff Size: Standard)   Pulse 85   Resp 18   Ht 5' 6\" (1.676 m)   Wt 92.1 " kg (203 lb)   SpO2 98%   BMI 32.77 kg/m²     Physical Exam  Constitutional:       General: She is awake.   HENT:      Right Ear: Hearing normal.      Left Ear: Hearing normal.     Eyes:      Extraocular Movements: Extraocular movements intact.       Musculoskeletal:      Comments: TMJ palpable pop appreciated on the left side with jaw opening     Neurological:      Mental Status: She is alert.     Psychiatric:         Speech: Speech normal.     Neurological Exam  Mental Status  Awake and alert. Oriented to person, place and time. Speech is normal. Language is fluent with no aphasia.    Cranial Nerves  CN III, IV, VI: Extraocular movements intact bilaterally.  CN V: Facial sensation is normal.  CN VII: Full and symmetric facial movement.  CN VIII:  Right: Hearing is normal.  Left: Hearing is normal.  CN XI: Shoulder shrug strength is normal.  CN XII: Tongue midline without atrophy or fasciculations.    Motor   Strength is 5/5 in all four extremities except as noted.  LUE  strength limited by brace and recent fracture.    Sensory  Light touch is normal in upper and lower extremities. Temperature is normal in upper and lower extremities.     Coordination  Right: Finger-to-nose normal. Heel-to-shin normal.Left: Finger-to-nose normal. Heel-to-shin normal.

## 2025-07-28 ENCOUNTER — OFFICE VISIT (OUTPATIENT)
Dept: OCCUPATIONAL THERAPY | Facility: CLINIC | Age: 65
End: 2025-07-28
Attending: ORTHOPAEDIC SURGERY
Payer: COMMERCIAL

## 2025-07-28 ENCOUNTER — TELEPHONE (OUTPATIENT)
Dept: OBGYN CLINIC | Facility: CLINIC | Age: 65
End: 2025-07-28

## 2025-07-28 DIAGNOSIS — S52.572D OTHER CLOSED INTRA-ARTICULAR FRACTURE OF DISTAL END OF LEFT RADIUS WITH ROUTINE HEALING, SUBSEQUENT ENCOUNTER: Primary | ICD-10-CM

## 2025-07-28 PROCEDURE — 97140 MANUAL THERAPY 1/> REGIONS: CPT | Performed by: OCCUPATIONAL THERAPIST

## 2025-07-28 PROCEDURE — 97110 THERAPEUTIC EXERCISES: CPT | Performed by: OCCUPATIONAL THERAPIST

## 2025-07-30 ENCOUNTER — OFFICE VISIT (OUTPATIENT)
Dept: OCCUPATIONAL THERAPY | Facility: CLINIC | Age: 65
End: 2025-07-30
Attending: ORTHOPAEDIC SURGERY
Payer: COMMERCIAL

## 2025-07-30 DIAGNOSIS — S52.572D OTHER CLOSED INTRA-ARTICULAR FRACTURE OF DISTAL END OF LEFT RADIUS WITH ROUTINE HEALING, SUBSEQUENT ENCOUNTER: Primary | ICD-10-CM

## 2025-07-30 PROCEDURE — 97140 MANUAL THERAPY 1/> REGIONS: CPT | Performed by: OCCUPATIONAL THERAPIST

## 2025-07-30 PROCEDURE — 97110 THERAPEUTIC EXERCISES: CPT | Performed by: OCCUPATIONAL THERAPIST

## 2025-07-31 ENCOUNTER — HOSPITAL ENCOUNTER (OUTPATIENT)
Age: 65
Discharge: HOME | End: 2025-07-31
Attending: FAMILY MEDICINE
Payer: COMMERCIAL

## 2025-07-31 VITALS — HEIGHT: 66 IN | BODY MASS INDEX: 32.47 KG/M2 | WEIGHT: 202 LBS

## 2025-07-31 DIAGNOSIS — Z12.31 ENCOUNTER FOR SCREENING MAMMOGRAM FOR BREAST CANCER: ICD-10-CM

## 2025-07-31 DIAGNOSIS — Z78.0 POSTMENOPAUSE: ICD-10-CM

## 2025-07-31 PROCEDURE — 77063 BREAST TOMOSYNTHESIS BI: CPT

## 2025-07-31 PROCEDURE — 77080 DXA BONE DENSITY AXIAL: CPT

## 2025-07-31 PROCEDURE — 77067 SCR MAMMO BI INCL CAD: CPT

## 2025-08-04 ENCOUNTER — OFFICE VISIT (OUTPATIENT)
Dept: OCCUPATIONAL THERAPY | Facility: CLINIC | Age: 65
End: 2025-08-04
Attending: ORTHOPAEDIC SURGERY
Payer: COMMERCIAL

## 2025-08-04 DIAGNOSIS — S52.572D OTHER CLOSED INTRA-ARTICULAR FRACTURE OF DISTAL END OF LEFT RADIUS WITH ROUTINE HEALING, SUBSEQUENT ENCOUNTER: Primary | ICD-10-CM

## 2025-08-04 PROCEDURE — 97140 MANUAL THERAPY 1/> REGIONS: CPT | Performed by: OCCUPATIONAL THERAPIST

## 2025-08-04 PROCEDURE — 97110 THERAPEUTIC EXERCISES: CPT | Performed by: OCCUPATIONAL THERAPIST

## 2025-08-11 ENCOUNTER — OFFICE VISIT (OUTPATIENT)
Dept: OCCUPATIONAL THERAPY | Facility: CLINIC | Age: 65
End: 2025-08-11
Attending: ORTHOPAEDIC SURGERY
Payer: COMMERCIAL

## 2025-08-20 ENCOUNTER — HOSPITAL ENCOUNTER (OUTPATIENT)
Dept: RADIOLOGY | Facility: HOSPITAL | Age: 65
Discharge: HOME | End: 2025-08-20
Attending: ORTHOPAEDIC SURGERY
Payer: COMMERCIAL

## 2025-08-20 ENCOUNTER — OFFICE VISIT (OUTPATIENT)
Dept: OBGYN CLINIC | Facility: HOSPITAL | Age: 65
End: 2025-08-20

## 2025-08-20 VITALS — WEIGHT: 202 LBS | BODY MASS INDEX: 32.47 KG/M2 | HEIGHT: 66 IN

## 2025-08-20 DIAGNOSIS — S52.572D OTHER CLOSED INTRA-ARTICULAR FRACTURE OF DISTAL END OF LEFT RADIUS WITH ROUTINE HEALING, SUBSEQUENT ENCOUNTER: Primary | ICD-10-CM

## 2025-08-20 DIAGNOSIS — S52.572D OTHER CLOSED INTRA-ARTICULAR FRACTURE OF DISTAL END OF LEFT RADIUS WITH ROUTINE HEALING, SUBSEQUENT ENCOUNTER: ICD-10-CM

## 2025-08-20 PROCEDURE — 99024 POSTOP FOLLOW-UP VISIT: CPT | Performed by: ORTHOPAEDIC SURGERY

## 2025-08-20 PROCEDURE — 73110 X-RAY EXAM OF WRIST: CPT
